# Patient Record
Sex: MALE | Race: WHITE | Employment: OTHER | URBAN - METROPOLITAN AREA
[De-identification: names, ages, dates, MRNs, and addresses within clinical notes are randomized per-mention and may not be internally consistent; named-entity substitution may affect disease eponyms.]

---

## 2017-01-19 ENCOUNTER — OFFICE VISIT (OUTPATIENT)
Dept: INTERNAL MEDICINE CLINIC | Age: 62
End: 2017-01-19

## 2017-01-19 VITALS
HEIGHT: 74 IN | SYSTOLIC BLOOD PRESSURE: 105 MMHG | HEART RATE: 88 BPM | WEIGHT: 180 LBS | DIASTOLIC BLOOD PRESSURE: 62 MMHG | BODY MASS INDEX: 23.1 KG/M2

## 2017-01-19 DIAGNOSIS — F17.201 TOBACCO ABUSE, IN REMISSION: ICD-10-CM

## 2017-01-19 DIAGNOSIS — Z13.9 SCREENING: ICD-10-CM

## 2017-01-19 DIAGNOSIS — I24.0 ISCHEMIC HEART DISEASE DUE TO CORONARY ARTERY OBSTRUCTION (HCC): ICD-10-CM

## 2017-01-19 DIAGNOSIS — E78.2 MIXED HYPERLIPIDEMIA: ICD-10-CM

## 2017-01-19 DIAGNOSIS — I10 ESSENTIAL HYPERTENSION: ICD-10-CM

## 2017-01-19 DIAGNOSIS — I25.9 ISCHEMIC HEART DISEASE DUE TO CORONARY ARTERY OBSTRUCTION (HCC): ICD-10-CM

## 2017-01-19 DIAGNOSIS — E55.9 VITAMIN D DEFICIENCY: ICD-10-CM

## 2017-01-19 DIAGNOSIS — F31.9 BIPOLAR AFFECTIVE DISORDER, REMISSION STATUS UNSPECIFIED (HCC): ICD-10-CM

## 2017-01-19 PROCEDURE — 99214 OFFICE O/P EST MOD 30 MIN: CPT | Performed by: INTERNAL MEDICINE

## 2017-01-19 RX ORDER — METFORMIN HYDROCHLORIDE 500 MG/1
2000 TABLET, EXTENDED RELEASE ORAL
Qty: 90 TABLET | Refills: 3 | Status: SHIPPED | OUTPATIENT
Start: 2017-01-19 | End: 2017-03-03 | Stop reason: SDUPTHER

## 2017-01-19 RX ORDER — SIMVASTATIN 40 MG
40 TABLET ORAL NIGHTLY
Qty: 90 TABLET | Refills: 3 | Status: SHIPPED | OUTPATIENT
Start: 2017-01-19 | End: 2018-01-27 | Stop reason: SDUPTHER

## 2017-01-19 RX ORDER — TAMSULOSIN HYDROCHLORIDE 0.4 MG/1
0.4 CAPSULE ORAL DAILY
Qty: 90 CAPSULE | Refills: 3 | Status: SHIPPED | OUTPATIENT
Start: 2017-01-19 | End: 2018-05-01 | Stop reason: SDUPTHER

## 2017-01-27 RX ORDER — CALCIUM CITRATE/VITAMIN D3 200MG-6.25
TABLET ORAL
Qty: 100 STRIP | Refills: 2 | Status: SHIPPED | OUTPATIENT
Start: 2017-01-27 | End: 2017-04-28 | Stop reason: SDUPTHER

## 2017-01-27 RX ORDER — BISACODYL 5 MG
TABLET, DELAYED RELEASE (ENTERIC COATED) ORAL
Qty: 30 TABLET | Refills: 2 | Status: SHIPPED | OUTPATIENT
Start: 2017-01-27 | End: 2017-04-28 | Stop reason: SDUPTHER

## 2017-01-27 RX ORDER — QUETIAPINE FUMARATE 100 MG/1
TABLET, FILM COATED ORAL
Qty: 90 TABLET | Refills: 2 | Status: SHIPPED | OUTPATIENT
Start: 2017-01-27 | End: 2017-04-28 | Stop reason: SDUPTHER

## 2017-01-27 RX ORDER — SIMVASTATIN 10 MG
TABLET ORAL
Qty: 30 TABLET | Refills: 2 | OUTPATIENT
Start: 2017-01-27

## 2017-01-27 RX ORDER — PRIMIDONE 50 MG/1
TABLET ORAL
Qty: 90 TABLET | Refills: 2 | Status: SHIPPED | OUTPATIENT
Start: 2017-01-27 | End: 2018-03-01 | Stop reason: SDUPTHER

## 2017-02-07 RX ORDER — ASPIRIN 81 MG
TABLET, DELAYED RELEASE (ENTERIC COATED) ORAL
Qty: 30 TABLET | Refills: 2 | Status: SHIPPED | OUTPATIENT
Start: 2017-02-07 | End: 2017-04-28 | Stop reason: SDUPTHER

## 2017-03-03 ENCOUNTER — TELEPHONE (OUTPATIENT)
Dept: INTERNAL MEDICINE CLINIC | Age: 62
End: 2017-03-03

## 2017-03-03 DIAGNOSIS — Z13.9 SCREENING: ICD-10-CM

## 2017-03-03 DIAGNOSIS — E78.2 MIXED HYPERLIPIDEMIA: ICD-10-CM

## 2017-03-03 DIAGNOSIS — E55.9 VITAMIN D DEFICIENCY: ICD-10-CM

## 2017-03-03 LAB
A/G RATIO: 1.4 (ref 1.1–2.2)
ALBUMIN SERPL-MCNC: 4.1 G/DL (ref 3.4–5)
ALP BLD-CCNC: 88 U/L (ref 40–129)
ALT SERPL-CCNC: 19 U/L (ref 10–40)
ANION GAP SERPL CALCULATED.3IONS-SCNC: 16 MMOL/L (ref 3–16)
AST SERPL-CCNC: 16 U/L (ref 15–37)
BILIRUB SERPL-MCNC: 0.3 MG/DL (ref 0–1)
BUN BLDV-MCNC: 18 MG/DL (ref 7–20)
CALCIUM SERPL-MCNC: 9.3 MG/DL (ref 8.3–10.6)
CHLORIDE BLD-SCNC: 99 MMOL/L (ref 99–110)
CHOLESTEROL, TOTAL: 121 MG/DL (ref 0–199)
CO2: 25 MMOL/L (ref 21–32)
CREAT SERPL-MCNC: 0.7 MG/DL (ref 0.8–1.3)
GFR AFRICAN AMERICAN: >60
GFR NON-AFRICAN AMERICAN: >60
GLOBULIN: 2.9 G/DL
GLUCOSE BLD-MCNC: 185 MG/DL (ref 70–99)
HDLC SERPL-MCNC: 48 MG/DL (ref 40–60)
HEPATITIS C ANTIBODY INTERPRETATION: NORMAL
LDL CHOLESTEROL CALCULATED: 58 MG/DL
POTASSIUM SERPL-SCNC: 4.1 MMOL/L (ref 3.5–5.1)
SODIUM BLD-SCNC: 140 MMOL/L (ref 136–145)
TOTAL PROTEIN: 7 G/DL (ref 6.4–8.2)
TRIGL SERPL-MCNC: 73 MG/DL (ref 0–150)
VITAMIN D 25-HYDROXY: 18.1 NG/ML
VLDLC SERPL CALC-MCNC: 15 MG/DL

## 2017-03-03 RX ORDER — METFORMIN HYDROCHLORIDE 500 MG/1
2000 TABLET, EXTENDED RELEASE ORAL
Qty: 120 TABLET | Refills: 11 | Status: SHIPPED | OUTPATIENT
Start: 2017-03-03 | End: 2017-09-18 | Stop reason: SDUPTHER

## 2017-03-04 LAB
ESTIMATED AVERAGE GLUCOSE: 246 MG/DL
HBA1C MFR BLD: 10.2 %

## 2017-03-06 LAB — HIV-1 AND HIV-2 ANTIBODIES: NORMAL

## 2017-03-10 ENCOUNTER — OFFICE VISIT (OUTPATIENT)
Dept: INTERNAL MEDICINE CLINIC | Age: 62
End: 2017-03-10

## 2017-03-10 VITALS
HEIGHT: 74 IN | BODY MASS INDEX: 24.26 KG/M2 | HEART RATE: 82 BPM | OXYGEN SATURATION: 91 % | WEIGHT: 189 LBS | DIASTOLIC BLOOD PRESSURE: 71 MMHG | RESPIRATION RATE: 16 BRPM | SYSTOLIC BLOOD PRESSURE: 131 MMHG

## 2017-03-10 DIAGNOSIS — J30.81 ALLERGIC RHINITIS DUE TO ANIMAL HAIR AND DANDER, UNSPECIFIED RHINITIS SEASONALITY: ICD-10-CM

## 2017-03-10 DIAGNOSIS — I24.0 ISCHEMIC HEART DISEASE DUE TO CORONARY ARTERY OBSTRUCTION (HCC): ICD-10-CM

## 2017-03-10 DIAGNOSIS — I25.9 ISCHEMIC HEART DISEASE DUE TO CORONARY ARTERY OBSTRUCTION (HCC): ICD-10-CM

## 2017-03-10 DIAGNOSIS — F31.9 BIPOLAR AFFECTIVE DISORDER, REMISSION STATUS UNSPECIFIED (HCC): ICD-10-CM

## 2017-03-10 DIAGNOSIS — I10 ESSENTIAL HYPERTENSION: ICD-10-CM

## 2017-03-10 DIAGNOSIS — E78.2 MIXED HYPERLIPIDEMIA: ICD-10-CM

## 2017-03-10 DIAGNOSIS — F17.201 TOBACCO ABUSE, IN REMISSION: ICD-10-CM

## 2017-03-10 DIAGNOSIS — E55.9 VITAMIN D DEFICIENCY: ICD-10-CM

## 2017-03-10 PROCEDURE — 99214 OFFICE O/P EST MOD 30 MIN: CPT | Performed by: NURSE PRACTITIONER

## 2017-03-10 RX ORDER — ERGOCALCIFEROL (VITAMIN D2) 1250 MCG
50000 CAPSULE ORAL WEEKLY
Qty: 4 CAPSULE | Refills: 3 | Status: SHIPPED | OUTPATIENT
Start: 2017-03-10 | End: 2017-07-30 | Stop reason: SDUPTHER

## 2017-03-10 ASSESSMENT — ENCOUNTER SYMPTOMS
RHINORRHEA: 1
BACK PAIN: 1
WHEEZING: 0
SHORTNESS OF BREATH: 0
COUGH: 0

## 2017-03-28 RX ORDER — INSULIN ASPART 100 [IU]/ML
INJECTION, SOLUTION INTRAVENOUS; SUBCUTANEOUS
Qty: 15 PEN | Refills: 10 | Status: SHIPPED | OUTPATIENT
Start: 2017-03-28 | End: 2018-05-01 | Stop reason: SDUPTHER

## 2017-03-28 RX ORDER — INSULIN DETEMIR 100 [IU]/ML
INJECTION, SOLUTION SUBCUTANEOUS
Qty: 5 PEN | Refills: 10 | Status: SHIPPED | OUTPATIENT
Start: 2017-03-28 | End: 2017-04-07 | Stop reason: SDUPTHER

## 2017-04-07 ENCOUNTER — OFFICE VISIT (OUTPATIENT)
Dept: INTERNAL MEDICINE CLINIC | Age: 62
End: 2017-04-07

## 2017-04-07 VITALS
BODY MASS INDEX: 24.26 KG/M2 | DIASTOLIC BLOOD PRESSURE: 76 MMHG | HEIGHT: 74 IN | HEART RATE: 89 BPM | WEIGHT: 189 LBS | SYSTOLIC BLOOD PRESSURE: 164 MMHG | RESPIRATION RATE: 17 BRPM

## 2017-04-07 DIAGNOSIS — Z72.0 TOBACCO ABUSE: ICD-10-CM

## 2017-04-07 DIAGNOSIS — E78.2 MIXED HYPERLIPIDEMIA: ICD-10-CM

## 2017-04-07 DIAGNOSIS — F43.10 POSTTRAUMATIC STRESS DISORDER: ICD-10-CM

## 2017-04-07 DIAGNOSIS — Z91.199 PATIENT NONADHERENCE: ICD-10-CM

## 2017-04-07 DIAGNOSIS — E55.9 VITAMIN D DEFICIENCY: ICD-10-CM

## 2017-04-07 DIAGNOSIS — I24.0 ISCHEMIC HEART DISEASE DUE TO CORONARY ARTERY OBSTRUCTION (HCC): ICD-10-CM

## 2017-04-07 DIAGNOSIS — I25.9 ISCHEMIC HEART DISEASE DUE TO CORONARY ARTERY OBSTRUCTION (HCC): ICD-10-CM

## 2017-04-07 DIAGNOSIS — I10 ESSENTIAL HYPERTENSION: ICD-10-CM

## 2017-04-07 DIAGNOSIS — F31.9 BIPOLAR AFFECTIVE DISORDER, REMISSION STATUS UNSPECIFIED (HCC): Primary | ICD-10-CM

## 2017-04-07 PROBLEM — F17.201 TOBACCO ABUSE, IN REMISSION: Status: RESOLVED | Noted: 2017-01-19 | Resolved: 2017-04-07

## 2017-04-07 LAB
CREATININE URINE POCT: 46.7
MICROALBUMIN/CREAT 24H UR: 48.7 MG/G{CREAT}
MICROALBUMIN/CREAT UR-RTO: 104.3

## 2017-04-07 PROCEDURE — 99215 OFFICE O/P EST HI 40 MIN: CPT | Performed by: INTERNAL MEDICINE

## 2017-04-07 PROCEDURE — 82044 UR ALBUMIN SEMIQUANTITATIVE: CPT | Performed by: INTERNAL MEDICINE

## 2017-05-02 RX ORDER — CALCIUM CITRATE/VITAMIN D3 200MG-6.25
TABLET ORAL
Qty: 100 STRIP | Refills: 1 | Status: SHIPPED | OUTPATIENT
Start: 2017-05-02 | End: 2017-06-29 | Stop reason: SDUPTHER

## 2017-05-02 RX ORDER — QUETIAPINE FUMARATE 100 MG/1
TABLET, FILM COATED ORAL
Qty: 90 TABLET | Refills: 1 | Status: SHIPPED | OUTPATIENT
Start: 2017-05-02 | End: 2017-07-01 | Stop reason: SDUPTHER

## 2017-05-02 RX ORDER — ASPIRIN 81 MG/1
TABLET ORAL
Qty: 30 TABLET | Refills: 1 | Status: SHIPPED | OUTPATIENT
Start: 2017-05-02 | End: 2017-06-29 | Stop reason: SDUPTHER

## 2017-05-02 RX ORDER — BISACODYL 5 MG
TABLET, DELAYED RELEASE (ENTERIC COATED) ORAL
Qty: 30 TABLET | Refills: 1 | Status: SHIPPED | OUTPATIENT
Start: 2017-05-02 | End: 2017-06-29 | Stop reason: SDUPTHER

## 2017-05-02 RX ORDER — DOCUSATE SODIUM 100 MG/1
CAPSULE ORAL
Qty: 180 CAPSULE | Refills: 2 | Status: SHIPPED | OUTPATIENT
Start: 2017-05-02 | End: 2018-03-05 | Stop reason: SDUPTHER

## 2017-05-02 RX ORDER — BUPROPION HYDROCHLORIDE 100 MG/1
TABLET, EXTENDED RELEASE ORAL
Qty: 90 TABLET | Refills: 2 | Status: SHIPPED | OUTPATIENT
Start: 2017-05-02 | End: 2018-01-30 | Stop reason: SDUPTHER

## 2017-06-30 RX ORDER — CALCIUM CITRATE/VITAMIN D3 200MG-6.25
TABLET ORAL
Qty: 100 STRIP | Refills: 0 | Status: SHIPPED | OUTPATIENT
Start: 2017-06-30 | End: 2017-07-26 | Stop reason: SDUPTHER

## 2017-06-30 RX ORDER — ASPIRIN 81 MG/1
TABLET ORAL
Qty: 30 TABLET | Refills: 0 | Status: SHIPPED | OUTPATIENT
Start: 2017-06-30 | End: 2017-07-26 | Stop reason: SDUPTHER

## 2017-06-30 RX ORDER — BISACODYL 5 MG
TABLET, DELAYED RELEASE (ENTERIC COATED) ORAL
Qty: 30 TABLET | Refills: 0 | Status: SHIPPED | OUTPATIENT
Start: 2017-06-30 | End: 2017-07-26 | Stop reason: SDUPTHER

## 2017-07-03 ENCOUNTER — TELEPHONE (OUTPATIENT)
Dept: INTERNAL MEDICINE CLINIC | Age: 62
End: 2017-07-03

## 2017-07-03 RX ORDER — QUETIAPINE FUMARATE 100 MG/1
TABLET, FILM COATED ORAL
Qty: 90 TABLET | Refills: 0 | Status: SHIPPED | OUTPATIENT
Start: 2017-07-03 | End: 2017-07-27 | Stop reason: SDUPTHER

## 2017-07-27 RX ORDER — QUETIAPINE FUMARATE 100 MG/1
TABLET, FILM COATED ORAL
Qty: 90 TABLET | OUTPATIENT
Start: 2017-07-27

## 2017-07-27 RX ORDER — QUETIAPINE FUMARATE 100 MG/1
TABLET, FILM COATED ORAL
Qty: 90 TABLET | Refills: 0 | Status: SHIPPED | OUTPATIENT
Start: 2017-07-27 | End: 2018-01-06

## 2017-07-27 RX ORDER — ASPIRIN 81 MG
TABLET, DELAYED RELEASE (ENTERIC COATED) ORAL
Qty: 100 TABLET | Refills: 3 | Status: SHIPPED | OUTPATIENT
Start: 2017-07-27 | End: 2018-01-30 | Stop reason: SDUPTHER

## 2017-07-27 RX ORDER — CALCIUM CITRATE/VITAMIN D3 200MG-6.25
TABLET ORAL
Qty: 100 STRIP | Refills: 11 | Status: SHIPPED | OUTPATIENT
Start: 2017-07-27 | End: 2017-09-18 | Stop reason: SDUPTHER

## 2017-08-10 ENCOUNTER — OFFICE VISIT (OUTPATIENT)
Dept: INTERNAL MEDICINE CLINIC | Age: 62
End: 2017-08-10

## 2017-08-10 VITALS
OXYGEN SATURATION: 93 % | TEMPERATURE: 98.5 F | DIASTOLIC BLOOD PRESSURE: 76 MMHG | SYSTOLIC BLOOD PRESSURE: 145 MMHG | WEIGHT: 173 LBS | BODY MASS INDEX: 22.21 KG/M2 | HEART RATE: 89 BPM

## 2017-08-10 DIAGNOSIS — F31.9 BIPOLAR AFFECTIVE DISORDER, REMISSION STATUS UNSPECIFIED (HCC): ICD-10-CM

## 2017-08-10 DIAGNOSIS — I25.9 ISCHEMIC HEART DISEASE DUE TO CORONARY ARTERY OBSTRUCTION (HCC): ICD-10-CM

## 2017-08-10 DIAGNOSIS — F33.9 RECURRENT MAJOR DEPRESSIVE EPISODES (HCC): ICD-10-CM

## 2017-08-10 DIAGNOSIS — Z91.199 PATIENT NONADHERENCE: ICD-10-CM

## 2017-08-10 DIAGNOSIS — I10 ESSENTIAL HYPERTENSION: ICD-10-CM

## 2017-08-10 DIAGNOSIS — R29.898 WEAKNESS OF BOTH LOWER EXTREMITIES: ICD-10-CM

## 2017-08-10 DIAGNOSIS — F43.10 POSTTRAUMATIC STRESS DISORDER: ICD-10-CM

## 2017-08-10 DIAGNOSIS — E78.2 MIXED HYPERLIPIDEMIA: ICD-10-CM

## 2017-08-10 DIAGNOSIS — I24.0 ISCHEMIC HEART DISEASE DUE TO CORONARY ARTERY OBSTRUCTION (HCC): ICD-10-CM

## 2017-08-10 DIAGNOSIS — M48.061 SPINAL STENOSIS OF LUMBAR REGION: ICD-10-CM

## 2017-08-10 DIAGNOSIS — Z72.0 TOBACCO ABUSE: ICD-10-CM

## 2017-08-10 LAB — HBA1C MFR BLD: 11.5 %

## 2017-08-10 PROCEDURE — 83036 HEMOGLOBIN GLYCOSYLATED A1C: CPT | Performed by: INTERNAL MEDICINE

## 2017-08-10 PROCEDURE — 99214 OFFICE O/P EST MOD 30 MIN: CPT | Performed by: INTERNAL MEDICINE

## 2017-09-18 ENCOUNTER — OFFICE VISIT (OUTPATIENT)
Dept: ENDOCRINOLOGY | Age: 62
End: 2017-09-18

## 2017-09-18 VITALS
WEIGHT: 174 LBS | HEIGHT: 74 IN | HEART RATE: 96 BPM | SYSTOLIC BLOOD PRESSURE: 126 MMHG | BODY MASS INDEX: 22.33 KG/M2 | DIASTOLIC BLOOD PRESSURE: 82 MMHG | OXYGEN SATURATION: 94 %

## 2017-09-18 DIAGNOSIS — E55.9 VITAMIN D DEFICIENCY: ICD-10-CM

## 2017-09-18 LAB — HBA1C MFR BLD: 7.5 %

## 2017-09-18 PROCEDURE — 83036 HEMOGLOBIN GLYCOSYLATED A1C: CPT | Performed by: NURSE PRACTITIONER

## 2017-09-18 PROCEDURE — 99203 OFFICE O/P NEW LOW 30 MIN: CPT | Performed by: NURSE PRACTITIONER

## 2017-09-18 PROCEDURE — G0444 DEPRESSION SCREEN ANNUAL: HCPCS | Performed by: NURSE PRACTITIONER

## 2017-09-18 RX ORDER — METFORMIN HYDROCHLORIDE 500 MG/1
2000 TABLET, EXTENDED RELEASE ORAL
Qty: 120 TABLET | Refills: 11 | Status: SHIPPED | OUTPATIENT
Start: 2017-09-18 | End: 2018-05-01 | Stop reason: SDUPTHER

## 2017-09-18 ASSESSMENT — PATIENT HEALTH QUESTIONNAIRE - PHQ9
9. THOUGHTS THAT YOU WOULD BE BETTER OFF DEAD, OR OF HURTING YOURSELF: 1
5. POOR APPETITE OR OVEREATING: 0
4. FEELING TIRED OR HAVING LITTLE ENERGY: 3
6. FEELING BAD ABOUT YOURSELF - OR THAT YOU ARE A FAILURE OR HAVE LET YOURSELF OR YOUR FAMILY DOWN: 1
2. FEELING DOWN, DEPRESSED OR HOPELESS: 3
10. IF YOU CHECKED OFF ANY PROBLEMS, HOW DIFFICULT HAVE THESE PROBLEMS MADE IT FOR YOU TO DO YOUR WORK, TAKE CARE OF THINGS AT HOME, OR GET ALONG WITH OTHER PEOPLE: 1
3. TROUBLE FALLING OR STAYING ASLEEP: 3
SUM OF ALL RESPONSES TO PHQ QUESTIONS 1-9: 17
SUM OF ALL RESPONSES TO PHQ9 QUESTIONS 1 & 2: 6
8. MOVING OR SPEAKING SO SLOWLY THAT OTHER PEOPLE COULD HAVE NOTICED. OR THE OPPOSITE, BEING SO FIGETY OR RESTLESS THAT YOU HAVE BEEN MOVING AROUND A LOT MORE THAN USUAL: 2
7. TROUBLE CONCENTRATING ON THINGS, SUCH AS READING THE NEWSPAPER OR WATCHING TELEVISION: 1
1. LITTLE INTEREST OR PLEASURE IN DOING THINGS: 3

## 2017-09-19 ASSESSMENT — ENCOUNTER SYMPTOMS
NAUSEA: 0
CONSTIPATION: 1
DIARRHEA: 0
EYE PAIN: 0
COLOR CHANGE: 0
WHEEZING: 1
SHORTNESS OF BREATH: 1

## 2017-10-26 DIAGNOSIS — E55.9 VITAMIN D DEFICIENCY: ICD-10-CM

## 2017-10-26 LAB
A/G RATIO: 1.1 (ref 1.1–2.2)
ALBUMIN SERPL-MCNC: 3.9 G/DL (ref 3.4–5)
ALP BLD-CCNC: 96 U/L (ref 40–129)
ALT SERPL-CCNC: 12 U/L (ref 10–40)
ANION GAP SERPL CALCULATED.3IONS-SCNC: 12 MMOL/L (ref 3–16)
AST SERPL-CCNC: 13 U/L (ref 15–37)
BILIRUB SERPL-MCNC: <0.2 MG/DL (ref 0–1)
BUN BLDV-MCNC: 12 MG/DL (ref 7–20)
CALCIUM SERPL-MCNC: 9.9 MG/DL (ref 8.3–10.6)
CHLORIDE BLD-SCNC: 94 MMOL/L (ref 99–110)
CO2: 29 MMOL/L (ref 21–32)
CREAT SERPL-MCNC: 0.6 MG/DL (ref 0.8–1.3)
CREATININE URINE: 18.9 MG/DL (ref 39–259)
GFR AFRICAN AMERICAN: >60
GFR NON-AFRICAN AMERICAN: >60
GLOBULIN: 3.4 G/DL
GLUCOSE BLD-MCNC: 338 MG/DL (ref 70–99)
HCT VFR BLD CALC: 45.6 % (ref 40.5–52.5)
HEMOGLOBIN: 15.3 G/DL (ref 13.5–17.5)
MCH RBC QN AUTO: 30.8 PG (ref 26–34)
MCHC RBC AUTO-ENTMCNC: 33.5 G/DL (ref 31–36)
MCV RBC AUTO: 92.2 FL (ref 80–100)
MICROALBUMIN UR-MCNC: <1.2 MG/DL
MICROALBUMIN/CREAT UR-RTO: ABNORMAL MG/G (ref 0–30)
PDW BLD-RTO: 15.2 % (ref 12.4–15.4)
PHOSPHORUS: 3.7 MG/DL (ref 2.5–4.9)
PLATELET # BLD: 171 K/UL (ref 135–450)
PMV BLD AUTO: 8.7 FL (ref 5–10.5)
POTASSIUM SERPL-SCNC: 5.3 MMOL/L (ref 3.5–5.1)
RBC # BLD: 4.95 M/UL (ref 4.2–5.9)
SODIUM BLD-SCNC: 135 MMOL/L (ref 136–145)
TOTAL PROTEIN: 7.3 G/DL (ref 6.4–8.2)
TSH SERPL DL<=0.05 MIU/L-ACNC: 1.4 UIU/ML (ref 0.27–4.2)
VITAMIN D 25-HYDROXY: 38.4 NG/ML
WBC # BLD: 11 K/UL (ref 4–11)

## 2017-11-01 ENCOUNTER — OFFICE VISIT (OUTPATIENT)
Dept: ENDOCRINOLOGY | Age: 62
End: 2017-11-01

## 2017-11-01 VITALS
WEIGHT: 176.6 LBS | HEIGHT: 74 IN | HEART RATE: 89 BPM | SYSTOLIC BLOOD PRESSURE: 148 MMHG | OXYGEN SATURATION: 94 % | BODY MASS INDEX: 22.66 KG/M2 | DIASTOLIC BLOOD PRESSURE: 80 MMHG

## 2017-11-01 LAB — HBA1C MFR BLD: 7.8 %

## 2017-11-01 PROCEDURE — 83036 HEMOGLOBIN GLYCOSYLATED A1C: CPT | Performed by: NURSE PRACTITIONER

## 2017-11-01 PROCEDURE — 99214 OFFICE O/P EST MOD 30 MIN: CPT | Performed by: NURSE PRACTITIONER

## 2017-11-01 RX ORDER — LANCETS
1 EACH MISCELLANEOUS 4 TIMES DAILY
Qty: 100 EACH | Refills: 6 | Status: SHIPPED | OUTPATIENT
Start: 2017-11-01 | End: 2018-06-01 | Stop reason: DRUGHIGH

## 2017-11-01 ASSESSMENT — PATIENT HEALTH QUESTIONNAIRE - PHQ9
SUM OF ALL RESPONSES TO PHQ9 QUESTIONS 1 & 2: 3
8. MOVING OR SPEAKING SO SLOWLY THAT OTHER PEOPLE COULD HAVE NOTICED. OR THE OPPOSITE, BEING SO FIGETY OR RESTLESS THAT YOU HAVE BEEN MOVING AROUND A LOT MORE THAN USUAL: 0
10. IF YOU CHECKED OFF ANY PROBLEMS, HOW DIFFICULT HAVE THESE PROBLEMS MADE IT FOR YOU TO DO YOUR WORK, TAKE CARE OF THINGS AT HOME, OR GET ALONG WITH OTHER PEOPLE: 1
6. FEELING BAD ABOUT YOURSELF - OR THAT YOU ARE A FAILURE OR HAVE LET YOURSELF OR YOUR FAMILY DOWN: 0
7. TROUBLE CONCENTRATING ON THINGS, SUCH AS READING THE NEWSPAPER OR WATCHING TELEVISION: 0
3. TROUBLE FALLING OR STAYING ASLEEP: 3
2. FEELING DOWN, DEPRESSED OR HOPELESS: 3
5. POOR APPETITE OR OVEREATING: 0
SUM OF ALL RESPONSES TO PHQ QUESTIONS 1-9: 10
4. FEELING TIRED OR HAVING LITTLE ENERGY: 3
9. THOUGHTS THAT YOU WOULD BE BETTER OFF DEAD, OR OF HURTING YOURSELF: 1
1. LITTLE INTEREST OR PLEASURE IN DOING THINGS: 0

## 2017-11-01 ASSESSMENT — ENCOUNTER SYMPTOMS
SHORTNESS OF BREATH: 0
EYE PAIN: 0
CONSTIPATION: 0
NAUSEA: 0
COLOR CHANGE: 0
DIARRHEA: 0

## 2017-11-01 NOTE — PROGRESS NOTES
vitamin D level is:  Lab Results   Component Value Date    VITD25 38.4 10/26/2017    VITD25 18.1 03/03/2017     Hyperlipidemia: Currently is on Zocor 40 mg. Controlled  Lab Results   Component Value Date    CHOL 121 03/03/2017     Lab Results   Component Value Date    TRIG 73 03/03/2017     Lab Results   Component Value Date    HDL 48 03/03/2017     Lab Results   Component Value Date    LDLCALC 58 03/03/2017     No results found for: LDLDIRECT  No results found for: CHOLHDLRATIO.5    Past Medical History:   Diagnosis Date    Allergic rhinitis     arthri     Arthritis     lumbar spine    CAD (coronary artery disease)     Chronic kidney disease     retention, bph    Constipation     DM (diabetes mellitus) ([address]     Generalized pain     GERD (gastroesophageal reflux disease)     constipation    Heart attack     Heart disease     Hyperlipidemia     Movement disorder     lumbar spine arthritis    Psychiatric problem     ptsd, anxiety, depression    Urinary retention      Family History   Problem Relation Age of Onset    Miscarriages / Stillbirths Mother     Diabetes Maternal Uncle     Diabetes Paternal Aunt      Review of Systems   Constitutional: Negative for activity change, appetite change, diaphoresis, fever and unexpected weight change. HENT: Negative for dental problem. Eyes: Positive for visual disturbance. Negative for pain. Respiratory: Negative for shortness of breath. Cardiovascular: Negative for chest pain, palpitations and leg swelling. Gastrointestinal: Negative for constipation, diarrhea and nausea. Endocrine: Negative for cold intolerance, heat intolerance, polydipsia, polyphagia and polyuria. Genitourinary: Negative for frequency and urgency. Musculoskeletal: Negative for arthralgias, joint swelling and myalgias. Skin: Negative for color change and pallor. Neurological: Positive for headaches. Negative for weakness and numbness.    Psychiatric/Behavioral: Negative for dysphoric mood and sleep disturbance. The patient is not nervous/anxious. Vitals:    11/01/17 1320   BP: (!) 148/80   Site: Right Arm   Position: Sitting   Cuff Size: Large Adult   Pulse: 89   SpO2: 94%   Weight: 176 lb 9.6 oz (80.1 kg)   Height: 6' 2\" (1.88 m)     Physical Exam   Constitutional: He is oriented to person, place, and time. He appears well-developed and well-nourished. Eyes: Pupils are equal, round, and reactive to light. Neck: Normal range of motion. No thyromegaly present. Cardiovascular: Normal rate, regular rhythm and normal heart sounds. Pulmonary/Chest: Effort normal and breath sounds normal.   Abdominal: He exhibits no distension. Musculoskeletal: Normal range of motion. He exhibits no edema. Neurological: He is alert and oriented to person, place, and time. No sensory deficit. Skin: Skin is warm and dry. No skin changes or evidence of trauma. Psychiatric: He has a normal mood and affect. His behavior is normal. Thought content normal.   Vitals reviewed. Skeletal foot exam: No skin lesions are noted. Skin is normal. Signs of onychomycosis are not noted on the skin. Calluses are not noted. Ingrown toenails are not noted. Toenails are normal. Pulses are +1 DP and +1 PT bilaterally. Capillary refill is <3 seconds. Skeletal structures are intact upon visual examination. No deformity is noted. Sensory: 10 g monofilament is 7/10 on the right and 10/10 on the left, 128 Hz vibration senses decreased bilaterally. Bruno Raphael is an uncontrolled diabetic for 23 + years with long term insulin use for > 11 years.     Plan  Problem List Items Addressed This Visit     Diabetes type 2, uncontrolled ([facility] [address] - Primary     Switch to injecting Lantus at HS @ same dose 70 U  Explained titration /adjusting Lantus to a morning fasting level of 140  Confusion about the variability in his A1c it went from 11.5 to 7.5 in one month with no significant treatment change/dietary

## 2017-11-01 NOTE — ASSESSMENT & PLAN NOTE
Switch to injecting Lantus at HS @ same dose 70 U  Explained titration /adjusting Lantus to a morning fasting level of 140  Confusion about the variability in his A1c it went from 11.5 to 7.5 in one month with no significant treatment change/dietary change  Recheck today established at 7.8%  Interested in managing better and reviewed CGM/Dexcom  Initiated paperwork to check for eligibility

## 2017-12-22 ENCOUNTER — OFFICE VISIT (OUTPATIENT)
Dept: ENDOCRINOLOGY | Age: 62
End: 2017-12-22

## 2017-12-22 VITALS
BODY MASS INDEX: 22.89 KG/M2 | WEIGHT: 178.4 LBS | HEART RATE: 102 BPM | OXYGEN SATURATION: 94 % | HEIGHT: 74 IN | DIASTOLIC BLOOD PRESSURE: 79 MMHG | SYSTOLIC BLOOD PRESSURE: 121 MMHG

## 2017-12-22 DIAGNOSIS — Z91.199 PATIENT NONADHERENCE: ICD-10-CM

## 2017-12-22 DIAGNOSIS — Z74.09 IMMOBILITY: ICD-10-CM

## 2017-12-22 DIAGNOSIS — F33.9 RECURRENT MAJOR DEPRESSIVE EPISODES (HCC): ICD-10-CM

## 2017-12-22 DIAGNOSIS — I10 ESSENTIAL HYPERTENSION: ICD-10-CM

## 2017-12-22 DIAGNOSIS — E78.2 MIXED HYPERLIPIDEMIA: ICD-10-CM

## 2017-12-22 DIAGNOSIS — Z72.0 TOBACCO ABUSE: ICD-10-CM

## 2017-12-22 PROCEDURE — 3045F PR MOST RECENT HEMOGLOBIN A1C LEVEL 7.0-9.0%: CPT | Performed by: NURSE PRACTITIONER

## 2017-12-22 PROCEDURE — G8484 FLU IMMUNIZE NO ADMIN: HCPCS | Performed by: NURSE PRACTITIONER

## 2017-12-22 PROCEDURE — 4004F PT TOBACCO SCREEN RCVD TLK: CPT | Performed by: NURSE PRACTITIONER

## 2017-12-22 PROCEDURE — 99214 OFFICE O/P EST MOD 30 MIN: CPT | Performed by: NURSE PRACTITIONER

## 2017-12-22 PROCEDURE — G8427 DOCREV CUR MEDS BY ELIG CLIN: HCPCS | Performed by: NURSE PRACTITIONER

## 2017-12-22 PROCEDURE — G8598 ASA/ANTIPLAT THER USED: HCPCS | Performed by: NURSE PRACTITIONER

## 2017-12-22 PROCEDURE — G8420 CALC BMI NORM PARAMETERS: HCPCS | Performed by: NURSE PRACTITIONER

## 2017-12-22 PROCEDURE — 3017F COLORECTAL CA SCREEN DOC REV: CPT | Performed by: NURSE PRACTITIONER

## 2017-12-22 RX ORDER — PROPRANOLOL HYDROCHLORIDE 10 MG/1
10 TABLET ORAL 2 TIMES DAILY
COMMUNITY
End: 2018-01-30

## 2017-12-22 ASSESSMENT — PATIENT HEALTH QUESTIONNAIRE - PHQ9
3. TROUBLE FALLING OR STAYING ASLEEP: 3
7. TROUBLE CONCENTRATING ON THINGS, SUCH AS READING THE NEWSPAPER OR WATCHING TELEVISION: 1
2. FEELING DOWN, DEPRESSED OR HOPELESS: 3
9. THOUGHTS THAT YOU WOULD BE BETTER OFF DEAD, OR OF HURTING YOURSELF: 0
SUM OF ALL RESPONSES TO PHQ QUESTIONS 1-9: 12
1. LITTLE INTEREST OR PLEASURE IN DOING THINGS: 2
10. IF YOU CHECKED OFF ANY PROBLEMS, HOW DIFFICULT HAVE THESE PROBLEMS MADE IT FOR YOU TO DO YOUR WORK, TAKE CARE OF THINGS AT HOME, OR GET ALONG WITH OTHER PEOPLE: 2
SUM OF ALL RESPONSES TO PHQ9 QUESTIONS 1 & 2: 5
4. FEELING TIRED OR HAVING LITTLE ENERGY: 3
5. POOR APPETITE OR OVEREATING: 0
8. MOVING OR SPEAKING SO SLOWLY THAT OTHER PEOPLE COULD HAVE NOTICED. OR THE OPPOSITE, BEING SO FIGETY OR RESTLESS THAT YOU HAVE BEEN MOVING AROUND A LOT MORE THAN USUAL: 0
6. FEELING BAD ABOUT YOURSELF - OR THAT YOU ARE A FAILURE OR HAVE LET YOURSELF OR YOUR FAMILY DOWN: 0

## 2017-12-22 ASSESSMENT — ENCOUNTER SYMPTOMS
EYE PAIN: 0
CONSTIPATION: 0
NAUSEA: 0
DIARRHEA: 0
COLOR CHANGE: 0
SHORTNESS OF BREATH: 0

## 2017-12-22 NOTE — PATIENT INSTRUCTIONS
Check sugars before meals  If over 150 add 2 units fo denita 50 points he sugar exceeds 10  Follow this scale  Sugars:  151 - 200: take 2 units  201 -250 : take 4 units  251 - 300: take 6 units  301- 350 : take 8 units  351- 400: take 10 units  > 450 : take 12 units    For every 10 gms of carbs in food ,or drink including alcohol add 1 unit of insulin per 10 grams.

## 2017-12-22 NOTE — PROGRESS NOTES
Endocrinology  [facility] [place]  Phone: [phone]   FAX: [phone]    Zuleyma Schulz is a 60year old male with a history of Diabetes Mellitus type 2 for 23 years    HPI   Diabetes:  Wing Evans is a 60 y.o. male  with a history of Diabetes Mellitus type 2 since he was in his early forties. Disease course has been stable and he is interested in improving A1c further. Has tried meformin , glybuide and glipizide with unsatisfactory reduction in A1c. Has brought his sugar / insulin log. Has not done CGM but is interested in it as a medical necessity due to severe issues with eyesight that make it hard to comply with frequent blood sugar checks. Patient uses a glucometer  Patient has not brought his glucometer for download, maintains a log at home,  States his current meter is not reliable with minute to minute variation in readings on rechecks  He is testing 4-6 times a day  Lowest in past 2 weeks: 65 (fasting)  Highest in past 2 weeks: 300 (fasting)  Hypoglycemia awareness and symptoms:has chills, visual disturbances that resolve with correction of sugar levels  Lives alone, limited mobility, wheel chair bound    A1C trends   Lab Results   Component Value Date    LABA1C 7.8 11/01/2017    LABA1C 7.5 09/18/2017    LABA1C 11.5 08/10/2017     Lab Results   Component Value Date    .0 03/03/2017     Current Medication regimen:   Levemir 70 U in the AM  Novolog: uses only moderate dose SSI; not a fixed dose per meal.  Injecting at mealtimes x 3 per day; injects  atleast 4 times per day  Covers for larger snacks when possible  Metformin 500 mg x 2 BID    Complications:  · Retinopathy : right eye enucleated s/p glaucoma and severe corneal infection, left eye s/p cataract 20/25 per patient  · Neuropathy:mild tingling in the feet.  Managed per podiatrist.  · Nephropathy:  Component    Latest Ref Rng & Units 10/26/2017   Microalbumin, Random Urine    <2.0 mg/dL <1.20   Creatinine, Ur    39.0 - 259.0 mg/dL 18.9 (L) Microalbumin Creatinine Ratio    0.0 - 30.0 mg/g see below     Diabetic Health Maintenance   · Last Eye Exam: follows Dr Michael Slater annually  · Last Foot exam: Sees podiatrist every 9 weeks  · Has patient seen a dietitian? Not recently  · Current Exercise: limited mobility in wheelchair, can transfer from chair to bed independently    Risk Factors  Smoker: started at 15 yrs old, quit 3 years ago but recently started back. Trying to quit again with help of e-cigarette. ETOH: 1-2 beer every night, uses regular. Miquel Hutchison not want to switch to a low autumn version ( \" few pleasures in life\")   Understands how to compensate for alcohol related hypoglycemia     Vitamin D deficiency: Currently is on 50K unit weekly. Current complaints include fatigue on daily basis. Last vitamin D level is:  Lab Results   Component Value Date    VITD25 38.4 10/26/2017    VITD25 18.1 03/03/2017     Hyperlipidemia: Currently is on Zocor 40 mg.  Controlled  Lab Results   Component Value Date    CHOL 121 03/03/2017     Lab Results   Component Value Date    TRIG 73 03/03/2017     Lab Results   Component Value Date    HDL 48 03/03/2017     Lab Results   Component Value Date    LDLCALC 58 03/03/2017     No results found for: LDLDIRECT  No results found for: CHOLHDLRATIO.5    Past Medical History:   Diagnosis Date    Allergic rhinitis     arthri     Arthritis     lumbar spine    CAD (coronary artery disease)     Chronic kidney disease     retention, bph    Constipation     DM [facility]di[facility] mellitus) ([facility] [address]     Generalized pain     GERD (gastroesophageal reflux disease)     constipation    Heart attack     Heart disease     Hyperlipidemia     Movement disorder     lumbar spine arthritis    Psychiatric problem     ptsd, anxiety, depression    Urinary retention      Family History   Problem Relation Age of Onset    Miscarriages / Stillbirths Mother     Diabetes Maternal Uncle     Diabetes Paternal Aunt      Review of Systems   Constitutional: Calluses are not noted. Ingrown toenails are not noted. Toenails are normal. Pulses are +1 DP and +1 PT bilaterally. Capillary refill is <3 seconds. Skeletal structures are intact upon visual examination. No deformity is noted. Sensory: 10 g monofilament is 7/10 on the right and 10/10 on the left, 128 Hz vibration senses decreased bilaterally. Michelle Ovalle is an uncontrolled diabetic for 23 + years with long term insulin use for > 11 years associated with diabetic neuropathy, hypertension, hyperlipidemia, limited mobility, very poor vision (post glaucoma) and depression. Plan  Problem List Items Addressed This Visit     Recurrent major depressive episodes ([address] - Primary    Hypertension     Elevated slightly at visit  Reports anxiety on being out of the house  Recheck the levels were WNL         Relevant Medications    propranolol (INDERAL) 10 MG tablet    Hyperlipidemia     Lipids at goal  Conitnues with statins but occasional side effects including myalgias and constipation         Relevant Medications    propranolol (INDERAL) 10 MG tablet    Diabetes type 2, uncontrolled ([address]     Patient is struggling to cope with diabetes management because of frequency of checks and injections which is hard given poor eyesight  Recommend that he apply for a personal CGM sensor such as Dexcom as a medical necessity in order to simplify /facilitate his diabetes management and improve outcomes.   Continue with Lantus at 70 U : did not understand titration so have re explained with written instructions to titrate up by 1 unit for every 3 days  Levels are > 130 fasting AM.  Continue with Humalog on sliding scale as below  Check sugars before meals  If over 150 add 2 units for every 50 points he sugar exceeds 10  151 - 200: take 2 units  201 -250 : take 4 units  251 - 300: take 6 units  301- 350 : take 8 units  351- 400: take 10 units  > 450 : take 12 units    For every 10 gms of carbs in food ,or drink including alcohol add 1 unit of insulin per 10 grams. Patient nonadherence     Adherence has improved  He is not intentionally adherent but the burden of diabetic management( frequent checks and injections) is overwhelming for him given his poor eyesight and limited mobility with no assistance at home. Immobility     Wheel chair bound for several years  Able to transfer self from chair to bed and in bathroom  Lives alone, does not have much help with ADLs         Tobacco abuse     Trying to quit again  Started to use an E cigarette           Other Visit Diagnoses    None. Return in about 2 months (around 2/22/2018).

## 2017-12-23 NOTE — ASSESSMENT & PLAN NOTE
Wheel chair bound for several years  Able to transfer self from chair to bed and in bathroom  Lives alone, does not have much help with ADLs

## 2018-01-05 ENCOUNTER — TELEPHONE (OUTPATIENT)
Dept: ENDOCRINOLOGY | Age: 63
End: 2018-01-05

## 2018-01-05 NOTE — TELEPHONE ENCOUNTER
Spoke with pt and pt stated that the home office in [place] for [address] called him and stated that there are no suppliers in his area that supply Dexcom. Pt also stated that the representative told him to have us send ppw straight to [facility] stating Dexcom is Medically Necessary. I advised pt I would speak with Adelaida Hatfield and see what he can do on his end to get this going. Spoke with Adelaida Hatfield with Dexcom and Ely is going to speak with an inside person who takes care of Medicare pt's to see what needs to be done with PPW to get pt approved. Thanks!

## 2018-01-27 DIAGNOSIS — E78.2 MIXED HYPERLIPIDEMIA: ICD-10-CM

## 2018-01-27 DIAGNOSIS — E55.9 VITAMIN D DEFICIENCY: ICD-10-CM

## 2018-01-30 ENCOUNTER — TELEPHONE (OUTPATIENT)
Dept: INTERNAL MEDICINE CLINIC | Age: 63
End: 2018-01-30

## 2018-01-30 ENCOUNTER — OFFICE VISIT (OUTPATIENT)
Dept: INTERNAL MEDICINE CLINIC | Age: 63
End: 2018-01-30

## 2018-01-30 VITALS
SYSTOLIC BLOOD PRESSURE: 132 MMHG | HEIGHT: 74 IN | BODY MASS INDEX: 22.97 KG/M2 | WEIGHT: 179 LBS | DIASTOLIC BLOOD PRESSURE: 72 MMHG | HEART RATE: 84 BPM

## 2018-01-30 DIAGNOSIS — E78.2 MIXED HYPERLIPIDEMIA: ICD-10-CM

## 2018-01-30 DIAGNOSIS — F33.9 RECURRENT MAJOR DEPRESSIVE EPISODES (HCC): ICD-10-CM

## 2018-01-30 DIAGNOSIS — E55.9 VITAMIN D DEFICIENCY: ICD-10-CM

## 2018-01-30 DIAGNOSIS — F51.05 INSOMNIA DUE TO MENTAL DISORDER: ICD-10-CM

## 2018-01-30 DIAGNOSIS — N40.1 BENIGN PROSTATIC HYPERPLASIA WITH URINARY FREQUENCY: ICD-10-CM

## 2018-01-30 DIAGNOSIS — F43.10 POSTTRAUMATIC STRESS DISORDER: ICD-10-CM

## 2018-01-30 DIAGNOSIS — F17.210 NICOTINE DEPENDENCE, CIGARETTES, UNCOMPLICATED: ICD-10-CM

## 2018-01-30 DIAGNOSIS — I10 ESSENTIAL HYPERTENSION: ICD-10-CM

## 2018-01-30 DIAGNOSIS — R35.0 BENIGN PROSTATIC HYPERPLASIA WITH URINARY FREQUENCY: ICD-10-CM

## 2018-01-30 DIAGNOSIS — Z76.89 ENCOUNTER TO ESTABLISH CARE: Primary | ICD-10-CM

## 2018-01-30 DIAGNOSIS — F31.9 BIPOLAR AFFECTIVE DISORDER, REMISSION STATUS UNSPECIFIED (HCC): ICD-10-CM

## 2018-01-30 DIAGNOSIS — Z23 NEED FOR INFLUENZA VACCINATION: ICD-10-CM

## 2018-01-30 PROCEDURE — G0008 ADMIN INFLUENZA VIRUS VAC: HCPCS | Performed by: NURSE PRACTITIONER

## 2018-01-30 PROCEDURE — 99215 OFFICE O/P EST HI 40 MIN: CPT | Performed by: NURSE PRACTITIONER

## 2018-01-30 PROCEDURE — 90686 IIV4 VACC NO PRSV 0.5 ML IM: CPT | Performed by: NURSE PRACTITIONER

## 2018-01-30 PROCEDURE — G0296 VISIT TO DETERM LDCT ELIG: HCPCS | Performed by: NURSE PRACTITIONER

## 2018-01-30 RX ORDER — BUPROPION HYDROCHLORIDE 100 MG/1
100 TABLET, EXTENDED RELEASE ORAL 2 TIMES DAILY
Qty: 180 TABLET | Refills: 3 | Status: SHIPPED | OUTPATIENT
Start: 2018-01-30 | End: 2018-11-27 | Stop reason: SDUPTHER

## 2018-01-30 RX ORDER — QUETIAPINE FUMARATE 300 MG/1
300 TABLET, FILM COATED ORAL NIGHTLY
Qty: 30 TABLET | Refills: 5 | Status: SHIPPED | OUTPATIENT
Start: 2018-01-30 | End: 2018-05-01 | Stop reason: SDUPTHER

## 2018-01-30 RX ORDER — ERGOCALCIFEROL 1.25 MG/1
CAPSULE ORAL
Qty: 4 CAPSULE | Refills: 4 | Status: SHIPPED | OUTPATIENT
Start: 2018-01-30 | End: 2018-01-30 | Stop reason: SDUPTHER

## 2018-01-30 RX ORDER — MELATONIN 10 MG
80 CAPSULE ORAL NIGHTLY
COMMUNITY

## 2018-01-30 RX ORDER — ERGOCALCIFEROL 1.25 MG/1
50000 CAPSULE ORAL WEEKLY
Qty: 12 CAPSULE | Refills: 0 | Status: SHIPPED | OUTPATIENT
Start: 2018-01-30 | End: 2018-09-28 | Stop reason: SDUPTHER

## 2018-01-30 RX ORDER — SIMVASTATIN 40 MG
40 TABLET ORAL NIGHTLY
Qty: 90 TABLET | Refills: 3 | Status: SHIPPED | OUTPATIENT
Start: 2018-01-30 | End: 2018-10-01 | Stop reason: SDUPTHER

## 2018-01-30 RX ORDER — SIMVASTATIN 40 MG
TABLET ORAL
Qty: 90 TABLET | Refills: 2 | Status: SHIPPED | OUTPATIENT
Start: 2018-01-30 | End: 2018-01-30 | Stop reason: SDUPTHER

## 2018-01-30 RX ORDER — DIPHENHYDRAMINE HCL 25 MG
50 TABLET ORAL
COMMUNITY

## 2018-01-30 RX ORDER — ASPIRIN 81 MG/1
81 TABLET ORAL DAILY
Qty: 90 TABLET | Refills: 3 | Status: SHIPPED | OUTPATIENT
Start: 2018-01-30 | End: 2019-02-18 | Stop reason: SDUPTHER

## 2018-01-30 NOTE — PROGRESS NOTES
background radiation exposure from everyday life. Starting screening at age 53 is not likely to increase cancer risk from radiation exposure. Patients with comorbidities resulting in life expectancy of < 10 years, or that would preclude treatment of an abnormality identified on CT, should not be screened due to lack of benefit. To obtain maximal benefit from this screening, smoking cessation and long-term abstinence from smoking is critical        Assessment/Plan:  Liss was seen today for established new doctor. Diagnoses and all orders for this visit:    Encounter to establish care  - H&P reviewed and scanned into EMR  - Oriented to provider and practice    Bipolar affective disorder, remission status unspecified (HCC)  -     QUEtiapine (SEROQUEL) 300 MG tablet; Take 1 tablet by mouth nightly  -     buPROPion (WELLBUTRIN SR) 100 MG extended release tablet; Take 1 tablet by mouth 2 times daily  -     Amb External Referral To Psychiatry    Recurrent major depressive episodes (HCC)  -     QUEtiapine (SEROQUEL) 300 MG tablet; Take 1 tablet by mouth nightly  -     buPROPion (WELLBUTRIN SR) 100 MG extended release tablet; Take 1 tablet by mouth 2 times daily  -     Amb External Referral To Psychiatry    Posttraumatic stress disorder  -     buPROPion (WELLBUTRIN SR) 100 MG extended release tablet; Take 1 tablet by mouth 2 times daily  -     Amb External Referral To Psychiatry    Insomnia due to mental disorder  -     QUEtiapine (SEROQUEL) 300 MG tablet; Take 1 tablet by mouth nightly    Uncontrolled type 2 diabetes mellitus without complication, with long-term current use of insulin (HCC)    Essential hypertension    Mixed hyperlipidemia  -     simvastatin (ZOCOR) 40 MG tablet; Take 1 tablet by mouth nightly    Vitamin D deficiency  -     vitamin D (ERGOCALCIFEROL) 52408 units CAPS capsule;  Take 1 capsule by mouth once a week    Benign prostatic hyperplasia with urinary frequency    Nicotine dependence,

## 2018-01-31 ASSESSMENT — ENCOUNTER SYMPTOMS
SHORTNESS OF BREATH: 0
GASTROINTESTINAL NEGATIVE: 1

## 2018-02-23 ENCOUNTER — OFFICE VISIT (OUTPATIENT)
Dept: ENDOCRINOLOGY | Age: 63
End: 2018-02-23

## 2018-02-23 ENCOUNTER — TELEPHONE (OUTPATIENT)
Dept: ENDOCRINOLOGY | Age: 63
End: 2018-02-23

## 2018-02-23 VITALS
BODY MASS INDEX: 24.41 KG/M2 | SYSTOLIC BLOOD PRESSURE: 133 MMHG | OXYGEN SATURATION: 94 % | HEART RATE: 86 BPM | DIASTOLIC BLOOD PRESSURE: 64 MMHG | HEIGHT: 74 IN | WEIGHT: 190.2 LBS

## 2018-02-23 DIAGNOSIS — E78.2 MIXED HYPERLIPIDEMIA: ICD-10-CM

## 2018-02-23 DIAGNOSIS — E55.9 VITAMIN D DEFICIENCY: ICD-10-CM

## 2018-02-23 DIAGNOSIS — I10 ESSENTIAL HYPERTENSION: ICD-10-CM

## 2018-02-23 LAB — HBA1C MFR BLD: 8.7 %

## 2018-02-23 PROCEDURE — 83036 HEMOGLOBIN GLYCOSYLATED A1C: CPT | Performed by: NURSE PRACTITIONER

## 2018-02-23 PROCEDURE — 99214 OFFICE O/P EST MOD 30 MIN: CPT | Performed by: NURSE PRACTITIONER

## 2018-02-23 RX ORDER — FLASH GLUCOSE SENSOR
1 KIT MISCELLANEOUS PRN
Qty: 1 DEVICE | Refills: 0 | Status: SHIPPED | OUTPATIENT
Start: 2018-02-23 | End: 2018-02-26 | Stop reason: SDUPTHER

## 2018-02-23 RX ORDER — BLOOD-GLUCOSE SENSOR
1 EACH MISCELLANEOUS
Qty: 1 EACH | Refills: 3 | Status: SHIPPED | OUTPATIENT
Start: 2018-02-23 | End: 2018-02-23 | Stop reason: CLARIF

## 2018-02-23 RX ORDER — FLASH GLUCOSE SENSOR
1 KIT MISCELLANEOUS PRN
Qty: 3 EACH | Refills: 3 | Status: SHIPPED | OUTPATIENT
Start: 2018-02-23 | End: 2018-02-26 | Stop reason: SDUPTHER

## 2018-02-23 RX ORDER — BLOOD-GLUCOSE TRANSMITTER
1 EACH MISCELLANEOUS
Qty: 1 EACH | Refills: 1 | Status: SHIPPED | OUTPATIENT
Start: 2018-02-23 | End: 2018-02-23 | Stop reason: CLARIF

## 2018-02-23 ASSESSMENT — PATIENT HEALTH QUESTIONNAIRE - PHQ9
5. POOR APPETITE OR OVEREATING: 0
7. TROUBLE CONCENTRATING ON THINGS, SUCH AS READING THE NEWSPAPER OR WATCHING TELEVISION: 0
8. MOVING OR SPEAKING SO SLOWLY THAT OTHER PEOPLE COULD HAVE NOTICED. OR THE OPPOSITE, BEING SO FIGETY OR RESTLESS THAT YOU HAVE BEEN MOVING AROUND A LOT MORE THAN USUAL: 1
3. TROUBLE FALLING OR STAYING ASLEEP: 3
9. THOUGHTS THAT YOU WOULD BE BETTER OFF DEAD, OR OF HURTING YOURSELF: 1
6. FEELING BAD ABOUT YOURSELF - OR THAT YOU ARE A FAILURE OR HAVE LET YOURSELF OR YOUR FAMILY DOWN: 1
10. IF YOU CHECKED OFF ANY PROBLEMS, HOW DIFFICULT HAVE THESE PROBLEMS MADE IT FOR YOU TO DO YOUR WORK, TAKE CARE OF THINGS AT HOME, OR GET ALONG WITH OTHER PEOPLE: 2
2. FEELING DOWN, DEPRESSED OR HOPELESS: 3
1. LITTLE INTEREST OR PLEASURE IN DOING THINGS: 1
SUM OF ALL RESPONSES TO PHQ9 QUESTIONS 1 & 2: 4
SUM OF ALL RESPONSES TO PHQ QUESTIONS 1-9: 13
4. FEELING TIRED OR HAVING LITTLE ENERGY: 3

## 2018-02-23 ASSESSMENT — ENCOUNTER SYMPTOMS
CONSTIPATION: 0
NAUSEA: 0
DIARRHEA: 0
SHORTNESS OF BREATH: 0
EYE PAIN: 0
COLOR CHANGE: 0

## 2018-02-23 NOTE — TELEPHONE ENCOUNTER
[facility] called to say the supplies that were ordered for this patient are not covered by pt's insurance. Said pt needs to go another route. If you have any questions, please call them back at [phone].

## 2018-02-23 NOTE — PROGRESS NOTES
corneal infection, left eye s/p cataract 20/25 per patient  · Neuropathy:mild tingling in the feet. Managed per podiatrist.  · Nephropathy:  Component    Latest Ref Rng & Units 10/26/2017   Microalbumin, Random Urine    <2.0 mg/dL <1.20   Creatinine, Ur    39.0 - 259.0 mg/dL 18.9 (L)   Microalbumin Creatinine Ratio    0.0 - 30.0 mg/g see below     Diabetic Health Maintenance   · Last Eye Exam: follows Dr Yesika Maier annually  · Last Foot exam: Sees podiatrist every 9 weeks  · Has patient seen a dietitian? Not recently  · Current Exercise: limited mobility in wheelchair, can transfer from chair to bed independently    Risk Factors  Smoker: started at 15 yrs old, quit 3 years ago but recently started back. Trying to quit again with help of e-cigarette. ETOH: 1-2 beer every night, uses regular. Tonblade Donath not want to switch to a low autumn version ( \" few pleasures in life\")   Understands how to compensate for alcohol related hypoglycemia     Vitamin D deficiency: Currently is on 50K unit weekly. Current complaints include fatigue on daily basis. Last vitamin D level is:  Lab Results   Component Value Date    VITD25 38.4 10/26/2017    VITD25 18.1 03/03/2017     Hyperlipidemia: Currently is on Zocor 40 mg.  Controlled  Lab Results   Component Value Date    CHOL 121 03/03/2017     Lab Results   Component Value Date    TRIG 73 03/03/2017     Lab Results   Component Value Date    HDL 48 03/03/2017     Lab Results   Component Value Date    LDLCALC 58 03/03/2017     No results found for: LDLDIRECT  No results found for: CHOLHDLRATIO.5    Past Medical History:   Diagnosis Date    Allergic rhinitis     arthri     Arthritis     lumbar spine    CAD (coronary artery disease)     Chronic kidney disease     retention, bph    Constipation     DM (diabetes mellitus) ([address]     Generalized pain     GERD (gastroesophageal reflux disease)     constipation    Heart attack     Heart disease     Hyperlipidemia     Movement disorder     lumbar spine arthritis    Psychiatric problem     ptsd, anxiety, depression    Urinary retention      Family History   Problem Relation Age of Onset   Elenore Grams / Stillbirths Mother     Cancer Mother     Heart Disease Father     Diabetes Brother     Diabetes Maternal Uncle     Diabetes Paternal Aunt      Review of Systems   Constitutional: Negative for activity change, appetite change, diaphoresis, fever and unexpected weight change. HENT: Negative for dental problem. Eyes: Positive for visual disturbance. Negative for pain. Respiratory: Negative for shortness of breath. Cardiovascular: Negative for palpitations and leg swelling. Gastrointestinal: Negative for constipation, diarrhea and nausea. Endocrine: Negative for cold intolerance and heat intolerance. Genitourinary: Negative for frequency and urgency. Musculoskeletal: Positive for gait problem and myalgias. Negative for arthralgias and joint swelling. Skin: Negative for color change. Neurological: Negative for numbness. Psychiatric/Behavioral: Negative for dysphoric mood and sleep disturbance. Vitals:    02/23/18 0954   BP: 133/64   Site: Right Arm   Position: Sitting   Cuff Size: Large Adult   Pulse: 86   SpO2: 94%   Weight: 190 lb 3.2 oz (86.3 kg)   Height: 6' 2\" (1.88 m)     Physical Exam   Constitutional: He is oriented to person, place, and time. He appears well-developed and well-nourished. Wheelchair, able to bear weight for transferring but has poor balance   Eyes: Pupils are equal, round, and reactive to light. Right eye enucleated   Neck: Normal range of motion. No thyromegaly present. Cardiovascular: Normal rate, regular rhythm and normal heart sounds. Pulmonary/Chest: Effort normal and breath sounds normal.   Abdominal: He exhibits no distension. Musculoskeletal: Normal range of motion. He exhibits no edema. Neurological: He is alert and oriented to person, place, and time. No sensory deficit.    Skin: Skin is warm and dry. No skin changes or evidence of trauma. Psychiatric: He has a normal mood and affect. His behavior is normal. Thought content normal.   Vitals reviewed. Skeletal foot exam: declined exam    Assessment  Koki Abdalla is an uncontrolled diabetic for 23 + years with long term insulin use for > 11 years associated with diabetic neuropathy, hypertension, hyperlipidemia, limited mobility, very poor vision (post glaucoma) and depression. Plan  Problem List Items Addressed This Visit     Essential hypertension     Controlled at visit, managed per PCP         Hyperlipidemia     LDL goal is  <100; levels in ref range at current dose         Vitamin D deficiency     On supplementation  Recent levels at 38 , up from 19. Continue current regimen         Diabetes type 2, uncontrolled (HCC) - Primary     A1c is higher than previous 7.8% to 8.7 % today  Significant fluctuation in sugar levels, with frequent hypoglycemic episodes in 50s, and highs in mid 400s  Checks 4-6 times daily, injects 4 times a day, patient has very poor eyesight  Has not made much headway with procuring Dexcom likely related to insurance coverage issues  Will check if free style [place] is an option , rx sent for sensor and reader, information provided to the patient     Patient reports diabetes management fatigue and reports he does not check at times/does not cover at times as it is too much work. Understands how to cover. Knows to refer to carb counting resource provided online. Snacks late night/middle of the night, so estimating fasting AM for levemir titration is challenging for patient, currently on 74 U QHS  Humalog on moderate dose regimen SSI , rec to cover for meals , snacks and beverages; I:C ration 1: 10  F/u in 3 months, may return sooner if sensor approved  Potential insulin pump client; will review at next visit.          Relevant Medications    insulin detemir (LEVEMIR FLEXTOUCH) 100 UNIT/ML injection pen    Other

## 2018-02-26 RX ORDER — FLASH GLUCOSE SENSOR
1 KIT MISCELLANEOUS PRN
Qty: 3 EACH | Refills: 3 | Status: SHIPPED | OUTPATIENT
Start: 2018-02-26 | End: 2018-05-25

## 2018-02-26 RX ORDER — FLASH GLUCOSE SENSOR
1 KIT MISCELLANEOUS PRN
Qty: 1 DEVICE | Refills: 0 | Status: SHIPPED | OUTPATIENT
Start: 2018-02-26 | End: 2018-05-25

## 2018-02-28 RX ORDER — DOCUSATE SODIUM 100 MG/1
CAPSULE, LIQUID FILLED ORAL
Qty: 180 CAPSULE | Refills: 1 | OUTPATIENT
Start: 2018-02-28

## 2018-02-28 RX ORDER — INSULIN ASPART 100 [IU]/ML
INJECTION, SOLUTION INTRAVENOUS; SUBCUTANEOUS
Qty: 15 PEN | Refills: 9 | OUTPATIENT
Start: 2018-02-28

## 2018-03-01 RX ORDER — PRIMIDONE 50 MG/1
50 TABLET ORAL NIGHTLY
Qty: 90 TABLET | Refills: 1 | Status: SHIPPED | OUTPATIENT
Start: 2018-03-01 | End: 2018-08-28 | Stop reason: SDUPTHER

## 2018-03-05 RX ORDER — DOCUSATE SODIUM 100 MG/1
CAPSULE, LIQUID FILLED ORAL
Qty: 180 CAPSULE | Refills: 0 | Status: SHIPPED | OUTPATIENT
Start: 2018-03-05 | End: 2018-06-29 | Stop reason: SDUPTHER

## 2018-04-12 ENCOUNTER — HOSPITAL ENCOUNTER (OUTPATIENT)
Dept: CT IMAGING | Age: 63
Discharge: HOME | End: 2018-04-12
Attending: NURSE PRACTITIONER | Admitting: NURSE PRACTITIONER

## 2018-04-12 DIAGNOSIS — F17.210 CIGARETTE SMOKER: ICD-10-CM

## 2018-04-12 DIAGNOSIS — F17.210 CIGARETTE NICOTINE DEPENDENCE, UNCOMPLICATED: ICD-10-CM

## 2018-04-16 ENCOUNTER — CASE MANAGEMENT (OUTPATIENT)
Dept: CASE MANAGEMENT | Age: 63
End: 2018-04-16

## 2018-04-16 DIAGNOSIS — J43.2 CENTRILOBULAR EMPHYSEMA (HCC): Primary | ICD-10-CM

## 2018-04-16 DIAGNOSIS — Z72.0 TOBACCO ABUSE: ICD-10-CM

## 2018-04-16 DIAGNOSIS — R93.89 ABNORMAL CT OF THE CHEST: ICD-10-CM

## 2018-05-01 ENCOUNTER — OFFICE VISIT (OUTPATIENT)
Dept: INTERNAL MEDICINE CLINIC | Age: 63
End: 2018-05-01

## 2018-05-01 VITALS — HEART RATE: 88 BPM | DIASTOLIC BLOOD PRESSURE: 72 MMHG | SYSTOLIC BLOOD PRESSURE: 122 MMHG

## 2018-05-01 DIAGNOSIS — I10 ESSENTIAL HYPERTENSION: ICD-10-CM

## 2018-05-01 DIAGNOSIS — E11.9 TYPE 2 DIABETES MELLITUS WITHOUT COMPLICATION, WITH LONG-TERM CURRENT USE OF INSULIN (HCC): ICD-10-CM

## 2018-05-01 DIAGNOSIS — Z00.00 ANNUAL PHYSICAL EXAM: Primary | ICD-10-CM

## 2018-05-01 DIAGNOSIS — F31.9 BIPOLAR AFFECTIVE DISORDER, REMISSION STATUS UNSPECIFIED (HCC): ICD-10-CM

## 2018-05-01 DIAGNOSIS — F17.218 CIGARETTE NICOTINE DEPENDENCE WITH OTHER NICOTINE-INDUCED DISORDER: ICD-10-CM

## 2018-05-01 DIAGNOSIS — Z11.4 SCREENING FOR HIV (HUMAN IMMUNODEFICIENCY VIRUS): ICD-10-CM

## 2018-05-01 DIAGNOSIS — N40.1 BENIGN PROSTATIC HYPERPLASIA WITH URINARY FREQUENCY: ICD-10-CM

## 2018-05-01 DIAGNOSIS — R35.0 BENIGN PROSTATIC HYPERPLASIA WITH URINARY FREQUENCY: ICD-10-CM

## 2018-05-01 DIAGNOSIS — I24.0 ISCHEMIC HEART DISEASE DUE TO CORONARY ARTERY OBSTRUCTION (HCC): ICD-10-CM

## 2018-05-01 DIAGNOSIS — R93.89 ABNORMAL CT OF THE CHEST: ICD-10-CM

## 2018-05-01 DIAGNOSIS — I25.9 ISCHEMIC HEART DISEASE DUE TO CORONARY ARTERY OBSTRUCTION (HCC): ICD-10-CM

## 2018-05-01 DIAGNOSIS — F51.05 INSOMNIA DUE TO MENTAL DISORDER: ICD-10-CM

## 2018-05-01 DIAGNOSIS — F33.9 RECURRENT MAJOR DEPRESSIVE EPISODES (HCC): ICD-10-CM

## 2018-05-01 DIAGNOSIS — Z79.4 TYPE 2 DIABETES MELLITUS WITHOUT COMPLICATION, WITH LONG-TERM CURRENT USE OF INSULIN (HCC): ICD-10-CM

## 2018-05-01 DIAGNOSIS — Z11.59 NEED FOR HEPATITIS C SCREENING TEST: ICD-10-CM

## 2018-05-01 LAB
A/G RATIO: 1.4 (ref 1.1–2.2)
ALBUMIN SERPL-MCNC: 4.6 G/DL (ref 3.4–5)
ALP BLD-CCNC: 91 U/L (ref 40–129)
ALT SERPL-CCNC: 14 U/L (ref 10–40)
ANION GAP SERPL CALCULATED.3IONS-SCNC: 16 MMOL/L (ref 3–16)
AST SERPL-CCNC: 15 U/L (ref 15–37)
BASOPHILS ABSOLUTE: 0.1 K/UL (ref 0–0.2)
BASOPHILS RELATIVE PERCENT: 0.8 %
BILIRUB SERPL-MCNC: <0.2 MG/DL (ref 0–1)
BUN BLDV-MCNC: 16 MG/DL (ref 7–20)
CALCIUM SERPL-MCNC: 9.8 MG/DL (ref 8.3–10.6)
CHLORIDE BLD-SCNC: 97 MMOL/L (ref 99–110)
CHOLESTEROL, TOTAL: 121 MG/DL (ref 0–199)
CO2: 27 MMOL/L (ref 21–32)
CREAT SERPL-MCNC: 0.6 MG/DL (ref 0.8–1.3)
EOSINOPHILS ABSOLUTE: 0.5 K/UL (ref 0–0.6)
EOSINOPHILS RELATIVE PERCENT: 4.1 %
GFR AFRICAN AMERICAN: >60
GFR NON-AFRICAN AMERICAN: >60
GLOBULIN: 3.2 G/DL
GLUCOSE BLD-MCNC: 85 MG/DL (ref 70–99)
HCT VFR BLD CALC: 43.3 % (ref 40.5–52.5)
HDLC SERPL-MCNC: 50 MG/DL (ref 40–60)
HEMOGLOBIN: 14.5 G/DL (ref 13.5–17.5)
HEPATITIS C ANTIBODY INTERPRETATION: NORMAL
LDL CHOLESTEROL CALCULATED: 49 MG/DL
LYMPHOCYTES ABSOLUTE: 3.6 K/UL (ref 1–5.1)
LYMPHOCYTES RELATIVE PERCENT: 30.5 %
MCH RBC QN AUTO: 30.9 PG (ref 26–34)
MCHC RBC AUTO-ENTMCNC: 33.4 G/DL (ref 31–36)
MCV RBC AUTO: 92.4 FL (ref 80–100)
MONOCYTES ABSOLUTE: 0.8 K/UL (ref 0–1.3)
MONOCYTES RELATIVE PERCENT: 7 %
NEUTROPHILS ABSOLUTE: 6.8 K/UL (ref 1.7–7.7)
NEUTROPHILS RELATIVE PERCENT: 57.6 %
PDW BLD-RTO: 13.9 % (ref 12.4–15.4)
PLATELET # BLD: 186 K/UL (ref 135–450)
PMV BLD AUTO: 8.6 FL (ref 5–10.5)
POTASSIUM SERPL-SCNC: 4.2 MMOL/L (ref 3.5–5.1)
RBC # BLD: 4.68 M/UL (ref 4.2–5.9)
SODIUM BLD-SCNC: 140 MMOL/L (ref 136–145)
TOTAL PROTEIN: 7.8 G/DL (ref 6.4–8.2)
TRIGL SERPL-MCNC: 110 MG/DL (ref 0–150)
VLDLC SERPL CALC-MCNC: 22 MG/DL
WBC # BLD: 11.7 K/UL (ref 4–11)

## 2018-05-01 PROCEDURE — 99214 OFFICE O/P EST MOD 30 MIN: CPT | Performed by: NURSE PRACTITIONER

## 2018-05-01 RX ORDER — QUETIAPINE FUMARATE 300 MG/1
300 TABLET, FILM COATED ORAL NIGHTLY
Qty: 30 TABLET | Refills: 5 | Status: SHIPPED | OUTPATIENT
Start: 2018-05-01 | End: 2018-08-28 | Stop reason: SDUPTHER

## 2018-05-01 RX ORDER — METFORMIN HYDROCHLORIDE 500 MG/1
2000 TABLET, EXTENDED RELEASE ORAL
Qty: 120 TABLET | Refills: 11 | Status: SHIPPED | OUTPATIENT
Start: 2018-05-01 | End: 2019-05-21 | Stop reason: SDUPTHER

## 2018-05-01 RX ORDER — TAMSULOSIN HYDROCHLORIDE 0.4 MG/1
0.4 CAPSULE ORAL DAILY
Qty: 90 CAPSULE | Refills: 1 | Status: SHIPPED | OUTPATIENT
Start: 2018-05-01 | End: 2018-08-28 | Stop reason: SDUPTHER

## 2018-05-02 PROBLEM — F17.200 NICOTINE DEPENDENCE: Status: ACTIVE | Noted: 2018-05-02

## 2018-05-02 LAB
HIV AG/AB: NORMAL
HIV ANTIGEN: NORMAL
HIV-1 ANTIBODY: NORMAL
HIV-2 AB: NORMAL

## 2018-05-02 ASSESSMENT — ENCOUNTER SYMPTOMS
WHEEZING: 0
SHORTNESS OF BREATH: 0
CHEST TIGHTNESS: 0
GASTROINTESTINAL NEGATIVE: 1
RESPIRATORY NEGATIVE: 1

## 2018-05-24 ENCOUNTER — HOSPITAL ENCOUNTER (OUTPATIENT)
Dept: PULMONOLOGY | Age: 63
Discharge: HOME | End: 2018-05-24
Attending: NURSE PRACTITIONER | Admitting: NURSE PRACTITIONER

## 2018-05-24 DIAGNOSIS — J43.2 CENTRILOBULAR EMPHYSEMA (HCC): ICD-10-CM

## 2018-05-24 LAB
DLCO %PRED: NORMAL
DLCO PRE: NORMAL
FEF 25-75%-POST: NORMAL
FEF 25-75%-PRE: NORMAL
FEV1-POST: NORMAL
FEV1-PRE: NORMAL
FEV1/FVC-POST: NORMAL
FEV1/FVC-PRE: NORMAL
FVC-POST: NORMAL
FVC-PRE: NORMAL
MEP: NORMAL
MIP: NORMAL
MVV %PRED-PRE: NORMAL
MVV-PRE: NORMAL
TLC %PRED: NORMAL
TLC PRE: NORMAL

## 2018-05-24 PROCEDURE — 99999 PR OFFICE/OUTPT VISIT,PROCEDURE ONLY: CPT | Performed by: INTERNAL MEDICINE

## 2018-05-25 ENCOUNTER — OFFICE VISIT (OUTPATIENT)
Dept: ENDOCRINOLOGY | Age: 63
End: 2018-05-25

## 2018-05-25 VITALS
WEIGHT: 194 LBS | HEART RATE: 92 BPM | SYSTOLIC BLOOD PRESSURE: 143 MMHG | HEIGHT: 74 IN | DIASTOLIC BLOOD PRESSURE: 88 MMHG | BODY MASS INDEX: 24.9 KG/M2 | OXYGEN SATURATION: 90 %

## 2018-05-25 DIAGNOSIS — I10 ESSENTIAL HYPERTENSION: ICD-10-CM

## 2018-05-25 DIAGNOSIS — E78.2 MIXED HYPERLIPIDEMIA: ICD-10-CM

## 2018-05-25 DIAGNOSIS — E55.9 VITAMIN D DEFICIENCY: ICD-10-CM

## 2018-05-25 LAB — HBA1C MFR BLD: 9 %

## 2018-05-25 PROCEDURE — G0444 DEPRESSION SCREEN ANNUAL: HCPCS | Performed by: NURSE PRACTITIONER

## 2018-05-25 PROCEDURE — 83036 HEMOGLOBIN GLYCOSYLATED A1C: CPT | Performed by: NURSE PRACTITIONER

## 2018-05-25 PROCEDURE — 99214 OFFICE O/P EST MOD 30 MIN: CPT | Performed by: NURSE PRACTITIONER

## 2018-05-25 ASSESSMENT — ENCOUNTER SYMPTOMS
NAUSEA: 0
DIARRHEA: 0
SHORTNESS OF BREATH: 0
COLOR CHANGE: 0
EYE PAIN: 0
CONSTIPATION: 0

## 2018-05-25 ASSESSMENT — PATIENT HEALTH QUESTIONNAIRE - PHQ9
10. IF YOU CHECKED OFF ANY PROBLEMS, HOW DIFFICULT HAVE THESE PROBLEMS MADE IT FOR YOU TO DO YOUR WORK, TAKE CARE OF THINGS AT HOME, OR GET ALONG WITH OTHER PEOPLE: 2
5. POOR APPETITE OR OVEREATING: 2
7. TROUBLE CONCENTRATING ON THINGS, SUCH AS READING THE NEWSPAPER OR WATCHING TELEVISION: 3
8. MOVING OR SPEAKING SO SLOWLY THAT OTHER PEOPLE COULD HAVE NOTICED. OR THE OPPOSITE, BEING SO FIGETY OR RESTLESS THAT YOU HAVE BEEN MOVING AROUND A LOT MORE THAN USUAL: 2
SUM OF ALL RESPONSES TO PHQ QUESTIONS 1-9: 18
3. TROUBLE FALLING OR STAYING ASLEEP: 3
1. LITTLE INTEREST OR PLEASURE IN DOING THINGS: 0
SUM OF ALL RESPONSES TO PHQ9 QUESTIONS 1 & 2: 3
9. THOUGHTS THAT YOU WOULD BE BETTER OFF DEAD, OR OF HURTING YOURSELF: 2
2. FEELING DOWN, DEPRESSED OR HOPELESS: 3
4. FEELING TIRED OR HAVING LITTLE ENERGY: 3

## 2018-05-28 DIAGNOSIS — R91.8 ABNORMAL CT LUNG SCREENING: Primary | ICD-10-CM

## 2018-05-29 ENCOUNTER — OFFICE VISIT (OUTPATIENT)
Dept: INTERNAL MEDICINE CLINIC | Age: 63
End: 2018-05-29

## 2018-05-29 VITALS — HEART RATE: 92 BPM | SYSTOLIC BLOOD PRESSURE: 146 MMHG | OXYGEN SATURATION: 92 % | DIASTOLIC BLOOD PRESSURE: 92 MMHG

## 2018-05-29 DIAGNOSIS — I10 ESSENTIAL HYPERTENSION: ICD-10-CM

## 2018-05-29 DIAGNOSIS — H61.23 BILATERAL IMPACTED CERUMEN: Primary | ICD-10-CM

## 2018-05-29 DIAGNOSIS — J43.2 CENTRILOBULAR EMPHYSEMA (HCC): ICD-10-CM

## 2018-05-29 DIAGNOSIS — R91.8 ABNORMAL CT LUNG SCREENING: ICD-10-CM

## 2018-05-29 LAB
ANION GAP SERPL CALCULATED.3IONS-SCNC: 20 MMOL/L (ref 3–16)
BUN BLDV-MCNC: 17 MG/DL (ref 7–20)
CALCIUM SERPL-MCNC: 9.9 MG/DL (ref 8.3–10.6)
CHLORIDE BLD-SCNC: 92 MMOL/L (ref 99–110)
CO2: 23 MMOL/L (ref 21–32)
CREAT SERPL-MCNC: 0.7 MG/DL (ref 0.8–1.3)
GFR AFRICAN AMERICAN: >60
GFR NON-AFRICAN AMERICAN: >60
GLUCOSE BLD-MCNC: 399 MG/DL (ref 70–99)
POTASSIUM SERPL-SCNC: 4.8 MMOL/L (ref 3.5–5.1)
SODIUM BLD-SCNC: 135 MMOL/L (ref 136–145)

## 2018-05-29 PROCEDURE — 99213 OFFICE O/P EST LOW 20 MIN: CPT | Performed by: NURSE PRACTITIONER

## 2018-05-29 PROCEDURE — 69210 REMOVE IMPACTED EAR WAX UNI: CPT | Performed by: NURSE PRACTITIONER

## 2018-05-29 RX ORDER — LOSARTAN POTASSIUM 25 MG/1
25 TABLET ORAL DAILY
Qty: 30 TABLET | Refills: 5 | Status: SHIPPED | OUTPATIENT
Start: 2018-05-29 | End: 2018-11-26

## 2018-05-29 ASSESSMENT — ENCOUNTER SYMPTOMS: RESPIRATORY NEGATIVE: 1

## 2018-05-30 ENCOUNTER — TELEPHONE (OUTPATIENT)
Dept: ENDOCRINOLOGY | Age: 63
End: 2018-05-30

## 2018-05-31 ENCOUNTER — TELEPHONE (OUTPATIENT)
Dept: INTERNAL MEDICINE CLINIC | Age: 63
End: 2018-05-31

## 2018-05-31 ENCOUNTER — TELEPHONE (OUTPATIENT)
Dept: ENDOCRINOLOGY | Age: 63
End: 2018-05-31

## 2018-05-31 DIAGNOSIS — E11.9 TYPE 2 DIABETES MELLITUS WITHOUT COMPLICATION, WITH LONG-TERM CURRENT USE OF INSULIN (HCC): ICD-10-CM

## 2018-05-31 DIAGNOSIS — Z79.4 TYPE 2 DIABETES MELLITUS WITHOUT COMPLICATION, WITH LONG-TERM CURRENT USE OF INSULIN (HCC): ICD-10-CM

## 2018-06-01 LAB
QUANTIFERON (R) TB GOLD (INCUBATED): NEGATIVE
QUANTIFERON MITOGEN: 8.69 IU/ML
QUANTIFERON NIL: 0.02 IU/ML
QUANTIFERON TB AG MINUS NIL: 0.01 IU/ML (ref 0–0.34)

## 2018-06-01 RX ORDER — BLOOD-GLUCOSE SENSOR
1 EACH MISCELLANEOUS PRN
Qty: 1 EACH | Refills: 0 | Status: SHIPPED | OUTPATIENT
Start: 2018-06-01 | End: 2018-08-31

## 2018-06-01 RX ORDER — LANCETS
1 EACH MISCELLANEOUS 4 TIMES DAILY
Qty: 100 EACH | Refills: 6 | Status: SHIPPED | OUTPATIENT
Start: 2018-06-01 | End: 2018-08-31

## 2018-06-01 RX ORDER — BLOOD-GLUCOSE,RECEIVER,CONT
1 EACH MISCELLANEOUS PRN
Qty: 1 DEVICE | Refills: 0 | Status: SHIPPED | OUTPATIENT
Start: 2018-06-01 | End: 2018-08-31

## 2018-06-01 RX ORDER — BLOOD-GLUCOSE TRANSMITTER
1 EACH MISCELLANEOUS PRN
Qty: 1 EACH | Refills: 0 | Status: SHIPPED | OUTPATIENT
Start: 2018-06-01 | End: 2018-08-31

## 2018-06-29 RX ORDER — DOCUSATE SODIUM 100 MG/1
CAPSULE, LIQUID FILLED ORAL
Qty: 180 CAPSULE | Refills: 0 | Status: SHIPPED | OUTPATIENT
Start: 2018-06-29 | End: 2018-09-28 | Stop reason: SDUPTHER

## 2018-07-19 ENCOUNTER — TELEPHONE (OUTPATIENT)
Dept: ENDOCRINOLOGY | Age: 63
End: 2018-07-19

## 2018-07-19 NOTE — TELEPHONE ENCOUNTER
[place] with  [employer] called stating the Dexcom G5 has been denied again for a third time. [place] stated pt is very upset. I advised Rod Roxana that his insurance [employer] does not have the best coverage and pt does not have pharmacy benefits either. I spoke with Leti López with Dexcom and we spoke with Amanda Martinez on a three way call at [facility] and he stated that he will resubmit and see if there are any DME suppliers that will  his insurance. Aven advised if not then pt will have to go with out of pocket purchase. Amanda Martinez will speak with pt and find out where to send script for the Dexom G5. Amanda Martinez will keep me updated. Thanks!

## 2018-08-21 ENCOUNTER — TELEPHONE (OUTPATIENT)
Dept: ENDOCRINOLOGY | Age: 63
End: 2018-08-21

## 2018-08-28 ENCOUNTER — OFFICE VISIT (OUTPATIENT)
Dept: INTERNAL MEDICINE CLINIC | Age: 63
End: 2018-08-28

## 2018-08-28 VITALS — HEART RATE: 92 BPM | SYSTOLIC BLOOD PRESSURE: 136 MMHG | DIASTOLIC BLOOD PRESSURE: 84 MMHG

## 2018-08-28 DIAGNOSIS — F33.9 RECURRENT MAJOR DEPRESSIVE EPISODES (HCC): ICD-10-CM

## 2018-08-28 DIAGNOSIS — Z13.31 POSITIVE DEPRESSION SCREENING: ICD-10-CM

## 2018-08-28 DIAGNOSIS — R35.0 BENIGN PROSTATIC HYPERPLASIA WITH URINARY FREQUENCY: ICD-10-CM

## 2018-08-28 DIAGNOSIS — N40.1 BENIGN PROSTATIC HYPERPLASIA WITH URINARY FREQUENCY: ICD-10-CM

## 2018-08-28 DIAGNOSIS — F31.9 BIPOLAR AFFECTIVE DISORDER, REMISSION STATUS UNSPECIFIED (HCC): ICD-10-CM

## 2018-08-28 DIAGNOSIS — F51.05 INSOMNIA DUE TO MENTAL DISORDER: ICD-10-CM

## 2018-08-28 LAB — HBA1C MFR BLD: 12 %

## 2018-08-28 PROCEDURE — G8431 POS CLIN DEPRES SCRN F/U DOC: HCPCS | Performed by: NURSE PRACTITIONER

## 2018-08-28 PROCEDURE — 99214 OFFICE O/P EST MOD 30 MIN: CPT | Performed by: NURSE PRACTITIONER

## 2018-08-28 PROCEDURE — 83036 HEMOGLOBIN GLYCOSYLATED A1C: CPT | Performed by: NURSE PRACTITIONER

## 2018-08-28 RX ORDER — TAMSULOSIN HYDROCHLORIDE 0.4 MG/1
0.4 CAPSULE ORAL DAILY
Qty: 90 CAPSULE | Refills: 1 | Status: SHIPPED | OUTPATIENT
Start: 2018-08-28 | End: 2019-01-30 | Stop reason: SDUPTHER

## 2018-08-28 RX ORDER — PRIMIDONE 50 MG/1
50 TABLET ORAL NIGHTLY
Qty: 90 TABLET | Refills: 1 | Status: SHIPPED | OUTPATIENT
Start: 2018-08-28 | End: 2018-11-27 | Stop reason: SDUPTHER

## 2018-08-28 RX ORDER — QUETIAPINE FUMARATE 300 MG/1
300 TABLET, FILM COATED ORAL NIGHTLY
Qty: 30 TABLET | Refills: 5 | Status: SHIPPED | OUTPATIENT
Start: 2018-08-28 | End: 2019-01-30 | Stop reason: SDUPTHER

## 2018-08-29 ASSESSMENT — ENCOUNTER SYMPTOMS
GASTROINTESTINAL NEGATIVE: 1
RESPIRATORY NEGATIVE: 1

## 2018-08-29 NOTE — PROGRESS NOTES
SMART) w/Device KIT 1 Units by Does not apply route 4 times daily 1 kit 0    Continuous Blood Gluc Sensor (DEXCOM G6 SENSOR) MISC 1 Units by Does not apply route as needed (as needed) 1 each 0    Continuous Blood Gluc  (DEXCOM G6 ) REEMA 1 Units by Does not apply route as needed (as needed) 1 Device 0    Continuous Blood Gluc Transmit (DEXCOM G6 TRANSMITTER) MISC 1 Units by Does not apply route as needed (as needed) 1 each 0    ONE TOUCH ULTRASOFT LANCETS MISC 1 each by Does not apply route 4 times daily 100 each 6    glucose blood VI test strips (EXACTECH TEST) strip 4 times daily. 100 strip 6    insulin detemir (LEVEMIR FLEXTOUCH) 100 UNIT/ML injection pen Inject 70 Units into the skin nightly 15 pen 5    losartan (COZAAR) 25 MG tablet Take 1 tablet by mouth daily 30 tablet 5    metFORMIN (GLUCOPHAGE XR) 500 MG extended release tablet Take 4 tablets by mouth daily (with breakfast) 120 tablet 11    insulin aspart (NOVOLOG FLEXPEN) 100 UNIT/ML injection pen INJECT UP  UNITS BYSUBCUTANEOUS ROUTE THREE TIMES A DAY AS DIRECTED 5 pen 10    Melatonin 10 MG CAPS Take 60 mg by mouth nightly       diphenhydrAMINE (BENADRYL) 25 MG tablet Take 25 mg by mouth nightly as needed for Sleep      simvastatin (ZOCOR) 40 MG tablet Take 1 tablet by mouth nightly 90 tablet 3    vitamin D (ERGOCALCIFEROL) 28535 units CAPS capsule Take 1 capsule by mouth once a week 12 capsule 0    aspirin (ASPIRIN LOW DOSE) 81 MG EC tablet Take 1 tablet by mouth daily 90 tablet 3    bisacodyl (BISACODYL) 5 MG EC tablet Take 1 tablet by mouth daily as needed for Constipation 30 tablet 11    buPROPion (WELLBUTRIN SR) 100 MG extended release tablet Take 1 tablet by mouth 2 times daily (Patient taking differently: Take 100 mg by mouth daily ) 180 tablet 3    glucose blood VI test strips (TRUE METRIX BLOOD GLUCOSE TEST) strip 3 each by Does not apply route 3 times daily As needed.  100 strip 11     No current

## 2018-08-31 ENCOUNTER — OFFICE VISIT (OUTPATIENT)
Dept: ENDOCRINOLOGY | Age: 63
End: 2018-08-31

## 2018-08-31 VITALS
HEIGHT: 74 IN | DIASTOLIC BLOOD PRESSURE: 73 MMHG | HEART RATE: 82 BPM | OXYGEN SATURATION: 96 % | WEIGHT: 189.6 LBS | SYSTOLIC BLOOD PRESSURE: 135 MMHG | BODY MASS INDEX: 24.33 KG/M2

## 2018-08-31 DIAGNOSIS — E78.2 MIXED HYPERLIPIDEMIA: ICD-10-CM

## 2018-08-31 DIAGNOSIS — F17.218 CIGARETTE NICOTINE DEPENDENCE WITH OTHER NICOTINE-INDUCED DISORDER: ICD-10-CM

## 2018-08-31 DIAGNOSIS — Z91.199 PATIENT NONADHERENCE: ICD-10-CM

## 2018-08-31 DIAGNOSIS — I10 ESSENTIAL HYPERTENSION: ICD-10-CM

## 2018-08-31 PROCEDURE — G0444 DEPRESSION SCREEN ANNUAL: HCPCS | Performed by: NURSE PRACTITIONER

## 2018-08-31 PROCEDURE — 99214 OFFICE O/P EST MOD 30 MIN: CPT | Performed by: NURSE PRACTITIONER

## 2018-08-31 RX ORDER — BLOOD-GLUCOSE METER
1 KIT MISCELLANEOUS
Qty: 1 DEVICE | Refills: 0 | Status: SHIPPED | OUTPATIENT
Start: 2018-08-31 | End: 2019-05-24 | Stop reason: ALTCHOICE

## 2018-08-31 RX ORDER — BLOOD-GLUCOSE TRANSMITTER
1 EACH MISCELLANEOUS PRN
Qty: 1 EACH | Refills: 0 | Status: SHIPPED | OUTPATIENT
Start: 2018-08-31 | End: 2018-11-26

## 2018-08-31 RX ORDER — BLOOD-GLUCOSE SENSOR
3 EACH MISCELLANEOUS 2 TIMES DAILY
Qty: 3 EACH | Refills: 0 | Status: SHIPPED | OUTPATIENT
Start: 2018-08-31 | End: 2018-11-26

## 2018-08-31 RX ORDER — BLOOD-GLUCOSE,RECEIVER,CONT
1 EACH MISCELLANEOUS DAILY
Qty: 1 DEVICE | Refills: 0 | Status: SHIPPED | OUTPATIENT
Start: 2018-08-31 | End: 2018-11-26

## 2018-08-31 ASSESSMENT — PATIENT HEALTH QUESTIONNAIRE - PHQ9
8. MOVING OR SPEAKING SO SLOWLY THAT OTHER PEOPLE COULD HAVE NOTICED. OR THE OPPOSITE, BEING SO FIGETY OR RESTLESS THAT YOU HAVE BEEN MOVING AROUND A LOT MORE THAN USUAL: 0
10. IF YOU CHECKED OFF ANY PROBLEMS, HOW DIFFICULT HAVE THESE PROBLEMS MADE IT FOR YOU TO DO YOUR WORK, TAKE CARE OF THINGS AT HOME, OR GET ALONG WITH OTHER PEOPLE: 2
7. TROUBLE CONCENTRATING ON THINGS, SUCH AS READING THE NEWSPAPER OR WATCHING TELEVISION: 1
SUM OF ALL RESPONSES TO PHQ9 QUESTIONS 1 & 2: 4
2. FEELING DOWN, DEPRESSED OR HOPELESS: 3
5. POOR APPETITE OR OVEREATING: 2
6. FEELING BAD ABOUT YOURSELF - OR THAT YOU ARE A FAILURE OR HAVE LET YOURSELF OR YOUR FAMILY DOWN: 0
SUM OF ALL RESPONSES TO PHQ QUESTIONS 1-9: 13
1. LITTLE INTEREST OR PLEASURE IN DOING THINGS: 1
4. FEELING TIRED OR HAVING LITTLE ENERGY: 2
3. TROUBLE FALLING OR STAYING ASLEEP: 3
9. THOUGHTS THAT YOU WOULD BE BETTER OFF DEAD, OR OF HURTING YOURSELF: 1
SUM OF ALL RESPONSES TO PHQ QUESTIONS 1-9: 13

## 2018-08-31 ASSESSMENT — ENCOUNTER SYMPTOMS
DIARRHEA: 0
SHORTNESS OF BREATH: 0
CONSTIPATION: 0
NAUSEA: 0
EYE PAIN: 0
COLOR CHANGE: 0

## 2018-08-31 NOTE — PROGRESS NOTES
Endocrinology  [facility] [place]  Phone: [phone]   FAX: [phone]    Becky Chavez is a 60year old male with a history of Diabetes Mellitus type 2 for 24 years    Last A1C:   Lab Results   Component Value Date    LABA1C 12.0 08/28/2018    LABA1C 9.0 05/25/2018    LABA1C 8.7 02/23/2018     Last BP Readings:   BP Readings from Last 3 Encounters:   08/31/18 135/73   08/28/18 136/84   05/29/18 (!) 146/92     Last LDL:   Lab Results   Component Value Date    LDLCALC 49 05/01/2018     Aspirin Use: 81 mg    Tobacco History:    Smoking status: Current Every Day Smoker     Packs/day: 2.00     Years: 41.00     Types: E-Cigarettes     Start date: 7/1/2014     Last attempt to quit: 1/1/2018    Smokeless tobacco: Never Used      Comment: using E-cig with nicotine. Last cigarette 1/1/18.  Alcohol use Yes     Diabetes     Leopold Miser is a 60 y.o. male  with a history of Diabetes Mellitus type 2 since he was in his early forties. Disease course has been stable and he is interested in improving A1c further. Has tried meformin , glybuide and glipizide with unsatisfactory reduction in A1c. Has brought his sugar / insulin log. He is at high risk for adverse events related to hypoglycemia   - frequent hypoglycemia in readings  - frequent erratic readings due to  issues as he is legally blind (enucleated eye)  - lives alone with no support and it is a safety concern given his frequent hypoglycemia  - unable to access food/drink quickly as he is wheelchair bound and cannot get to help quickly          Interim HPI 8/31/18  Presents today without meter or log. Reports he has not been compliant a few weeks in recent past. Overwhelmed with the burden of disease management. Has been unsuccessful with appealing to his insurance for a Dexcom Sensor ( due to T2DM ) and is upset about that. Wife in nursing home and receiving subpar care which is also a major stressor. Continues to smoke.  Reports decreased muscle strength but can transfer to bed to wheelchair independently. Interim HPI 5/25/18  Pesents today without meter or log. Has not checked recently as fingers are very sore and meter readings are highly variable. Worried about frequent lows as is dosing insulin without checking    Interim HPI 2/23/2018  ED visit on 1/6/18 for insomnia. Out of his medication and had not sleep in 4 days. He has tried to get in touch with his provider Luke Jaquez cnp in [place]    Patient brought a detailed log of BG and insulin dosing  States his current meter is not reliable with minute to minute variation in readings on rechecks  Log shows frequent lows in the 50s,   He is testing 4-6 times a day on the days that he feels well enough to check  Lowest in past 2 weeks: 51(fasting)  Highest in past 2 weeks: 431 (fasting); often has beer at night  Hypoglycemia awareness and symptoms:has chills, visual disturbances that resolve with correction of sugar levels  Lives alone, limited mobility, wheel chair bound    Current Medication regimen:   Metformin 500 mg x 2 BID  Levemir 70 U in the AM  Novolog: I:C ratio is 1: 10 grams of carbs    Injecting at mealtimes x 3 per day; injects  atleast 4 times per day  Concerned about variability in his meter readings and if he is able to dose accurately  Covers for larger snacks when possible, but has trouble checking due to reasons above    Complications:  · Retinopathy : right eye enucleated s/p glaucoma and severe corneal infection, left eye s/p cataract per patient  · Neuropathy:mild tingling in the feet.  Managed per podiatrist.  · Nephropathy:  Component    Latest Ref Rng & Units 10/26/2017   Microalbumin, Random Urine    <2.0 mg/dL <1.20   Creatinine, Ur    39.0 - 259.0 mg/dL 18.9 (L)   Microalbumin Creatinine Ratio    0.0 - 30.0 mg/g see below     Diabetic Health Maintenance   · Last Eye Exam: follows Dr Anthony Simon annually, last > 1 year ago  · Last Foot exam: Sees podiatrist every 9 change, appetite change, diaphoresis, fever and unexpected weight change. HENT: Negative for dental problem. Eyes: Positive for visual disturbance. Negative for pain. Respiratory: Negative for shortness of breath. Cardiovascular: Negative for palpitations and leg swelling. Gastrointestinal: Negative for constipation, diarrhea and nausea. Endocrine: Negative for cold intolerance and heat intolerance. Genitourinary: Negative for frequency and urgency. Musculoskeletal: Positive for gait problem and myalgias. Negative for arthralgias and joint swelling. Skin: Negative for color change. Neurological: Negative for numbness. Psychiatric/Behavioral: Negative for dysphoric mood and sleep disturbance. Vitals:    08/31/18 1049   BP: 135/73   Site: Left Arm   Position: Sitting   Cuff Size: Medium Adult   Pulse: 82   SpO2: 96%   Weight: 189 lb 9.6 oz (86 kg)   Height: 6' 2\" (1.88 m)     Physical Exam   Constitutional: He is oriented to person, place, and time. He appears well-developed and well-nourished. Wheelchair, able to bear weight for transferring but has poor balance   Eyes: Pupils are equal, round, and reactive to light. Right eye enucleated   Neck: Normal range of motion. No thyromegaly present. Cardiovascular: Normal rate, regular rhythm and normal heart sounds. Pulmonary/Chest: Effort normal and breath sounds normal.   Abdominal: He exhibits no distension. Musculoskeletal: Normal range of motion. He exhibits no edema. Neurological: He is alert and oriented to person, place, and time. No sensory deficit. Skin: Skin is warm and dry. No skin changes or evidence of trauma. Psychiatric: He has a normal mood and affect. His behavior is normal. Thought content normal.   Vitals reviewed.     Skeletal foot exam: declined exam    Assessment  Arash Sands is an uncontrolled diabetic for 23 + years with long term insulin use for > 11 years associated with diabetic neuropathy,

## 2018-08-31 NOTE — TELEPHONE ENCOUNTER
Pt called to say he was returning a call from someone who left him a message today. Please call pt back. He does not know who called.

## 2018-09-04 NOTE — TELEPHONE ENCOUNTER
Called pt back and l/m regarding paper scripts for dexcom. Pt stated during appt on 8/31/18 that he wanted to hand carry the scripts. Thanks!

## 2018-09-28 DIAGNOSIS — E55.9 VITAMIN D DEFICIENCY: ICD-10-CM

## 2018-10-01 DIAGNOSIS — E78.2 MIXED HYPERLIPIDEMIA: ICD-10-CM

## 2018-10-01 RX ORDER — SIMVASTATIN 40 MG
40 TABLET ORAL NIGHTLY
Qty: 90 TABLET | Refills: 1 | Status: SHIPPED | OUTPATIENT
Start: 2018-10-01 | End: 2018-12-28 | Stop reason: SDUPTHER

## 2018-10-01 RX ORDER — DOCUSATE SODIUM 100 MG/1
CAPSULE, LIQUID FILLED ORAL
Qty: 180 CAPSULE | Refills: 1 | Status: SHIPPED | OUTPATIENT
Start: 2018-10-01 | End: 2018-12-28 | Stop reason: SDUPTHER

## 2018-10-01 RX ORDER — ERGOCALCIFEROL 1.25 MG/1
CAPSULE ORAL
Qty: 12 CAPSULE | Refills: 1 | Status: SHIPPED | OUTPATIENT
Start: 2018-10-01 | End: 2018-12-28 | Stop reason: SDUPTHER

## 2018-10-11 ENCOUNTER — TELEPHONE (OUTPATIENT)
Dept: INTERNAL MEDICINE CLINIC | Age: 63
End: 2018-10-11

## 2018-10-11 NOTE — TELEPHONE ENCOUNTER
Pt called at 9:52 and can    He said his transportation showed up late and the lift on the [address] is not working so he has to [employer]

## 2018-10-25 ENCOUNTER — OFFICE VISIT (OUTPATIENT)
Dept: PSYCHOLOGY | Age: 63
End: 2018-10-25
Payer: COMMERCIAL

## 2018-10-25 ENCOUNTER — CARE COORDINATION (OUTPATIENT)
Dept: CARE COORDINATION | Age: 63
End: 2018-10-25

## 2018-10-25 DIAGNOSIS — F43.10 POSTTRAUMATIC STRESS DISORDER: Primary | ICD-10-CM

## 2018-10-25 DIAGNOSIS — F33.1 MODERATE EPISODE OF RECURRENT MAJOR DEPRESSIVE DISORDER (HCC): ICD-10-CM

## 2018-10-25 PROCEDURE — 90791 PSYCH DIAGNOSTIC EVALUATION: CPT | Performed by: PSYCHOLOGIST

## 2018-10-25 ASSESSMENT — PATIENT HEALTH QUESTIONNAIRE - PHQ9
4. FEELING TIRED OR HAVING LITTLE ENERGY: 2
SUM OF ALL RESPONSES TO PHQ QUESTIONS 1-9: 20
6. FEELING BAD ABOUT YOURSELF - OR THAT YOU ARE A FAILURE OR HAVE LET YOURSELF OR YOUR FAMILY DOWN: 2
1. LITTLE INTEREST OR PLEASURE IN DOING THINGS: 3
7. TROUBLE CONCENTRATING ON THINGS, SUCH AS READING THE NEWSPAPER OR WATCHING TELEVISION: 2
5. POOR APPETITE OR OVEREATING: 2
SUM OF ALL RESPONSES TO PHQ QUESTIONS 1-9: 20
8. MOVING OR SPEAKING SO SLOWLY THAT OTHER PEOPLE COULD HAVE NOTICED. OR THE OPPOSITE, BEING SO FIGETY OR RESTLESS THAT YOU HAVE BEEN MOVING AROUND A LOT MORE THAN USUAL: 1
9. THOUGHTS THAT YOU WOULD BE BETTER OFF DEAD, OR OF HURTING YOURSELF: 3
SUM OF ALL RESPONSES TO PHQ9 QUESTIONS 1 & 2: 5
2. FEELING DOWN, DEPRESSED OR HOPELESS: 2
3. TROUBLE FALLING OR STAYING ASLEEP: 3
10. IF YOU CHECKED OFF ANY PROBLEMS, HOW DIFFICULT HAVE THESE PROBLEMS MADE IT FOR YOU TO DO YOUR WORK, TAKE CARE OF THINGS AT HOME, OR GET ALONG WITH OTHER PEOPLE: 3

## 2018-10-25 NOTE — PROGRESS NOTES
anhedonia, poor self-worth, social isolation, high appetite, insomnia/hypersomnia, fatigue, psychomotor retardation, and poor concentration/focus. Reported he wonders about death, but adamantly denied intent or plan d/t [employer] beliefs about how wrong it would be to take his life. Working on making changes to diet slowly to reduce carbs, but fasting BS continue to be 170+. Stated much of his stress is d/t \"The world is run by [place]. On every level of government. \" Distressed by what he perceives as wife's poor care at her ECF and financial difficulties that are all-consuming to him. 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 Collaborated alfred Cesar to possibly arrange for social work support. Substance use:    - 1-2 beers/evening, reported 3 or make make him feel impaired, but drinks this much 2-3x/mo \"If it's been a really bad day. \"   -Caffeine: 4 c of coffee/day. Provided psychoeducation on relationship between caffeine and anxiety. Pt agreed to consider reduction.   - Nicotine: Down to 3mg/ml nicotine juice, plans to quit completely. Of note, when I left office to discuss case alfred Mackenziederrick, pt clearly used his e-cig and tried to hide it upon my return.       O:  MSE:    Appearance    alert  Impulsive behavior Yes  Speech    spontaneous, normal rate, normal volume, well articulated and high productivity  Mood    Anxious  Angry  Depressed  Affect    agitation  Thought Content    intact, cognitive distortions and all or nothing thinking  Thought Process    goal directed, coherent and tangential  Associations    logical connections, tangential connections  Insight    Fair  Judgment    Fair  Orientation    oriented to person, place, time, and general circumstances  Memory    recent and remote memory intact  Attention/Concentration    impaired  Morbid ideation Yes, Patient

## 2018-10-29 NOTE — CARE COORDINATION
This ACC spoke with Oly Rogers at [facility]. Zaynab Jenkins CM is aware of living situation and has observed that it is mostly cosmetic in which [employer] will not financially assist.  Akanksha Hurley has provided resources to the pt to follow up on and she has a F2F scheduled on 11/5/2018 to discuss this further with the pt.

## 2018-11-26 ENCOUNTER — OFFICE VISIT (OUTPATIENT)
Dept: INTERNAL MEDICINE CLINIC | Age: 63
End: 2018-11-26
Payer: COMMERCIAL

## 2018-11-26 VITALS
WEIGHT: 189 LBS | BODY MASS INDEX: 24.27 KG/M2 | DIASTOLIC BLOOD PRESSURE: 76 MMHG | HEART RATE: 84 BPM | SYSTOLIC BLOOD PRESSURE: 130 MMHG

## 2018-11-26 DIAGNOSIS — F31.9 BIPOLAR AFFECTIVE DISORDER, REMISSION STATUS UNSPECIFIED (HCC): ICD-10-CM

## 2018-11-26 DIAGNOSIS — I10 ESSENTIAL HYPERTENSION: Primary | ICD-10-CM

## 2018-11-26 DIAGNOSIS — F33.1 MODERATE EPISODE OF RECURRENT MAJOR DEPRESSIVE DISORDER (HCC): ICD-10-CM

## 2018-11-26 DIAGNOSIS — E11.65 UNCONTROLLED TYPE 2 DIABETES MELLITUS WITH HYPERGLYCEMIA (HCC): ICD-10-CM

## 2018-11-26 DIAGNOSIS — Z23 NEED FOR INFLUENZA VACCINATION: ICD-10-CM

## 2018-11-26 PROCEDURE — 90682 RIV4 VACC RECOMBINANT DNA IM: CPT | Performed by: NURSE PRACTITIONER

## 2018-11-26 PROCEDURE — 99214 OFFICE O/P EST MOD 30 MIN: CPT | Performed by: NURSE PRACTITIONER

## 2018-11-26 PROCEDURE — G0008 ADMIN INFLUENZA VIRUS VAC: HCPCS | Performed by: NURSE PRACTITIONER

## 2018-11-26 RX ORDER — POTASSIUM CHLORIDE 750 MG/1
10 TABLET, EXTENDED RELEASE ORAL DAILY
Qty: 90 TABLET | Refills: 1 | Status: SHIPPED | OUTPATIENT
Start: 2018-11-26 | End: 2019-02-18 | Stop reason: SDUPTHER

## 2018-11-26 RX ORDER — LISINOPRIL 10 MG/1
10 TABLET ORAL DAILY
Qty: 90 TABLET | Refills: 1 | Status: SHIPPED | OUTPATIENT
Start: 2018-11-26 | End: 2019-02-18 | Stop reason: SDUPTHER

## 2018-11-26 NOTE — PROGRESS NOTES
Take 1 tablet by mouth nightly 90 tablet 1    insulin aspart protamine-insulin aspart (NOVOLOG MIX 70/30 FLEXPEN) (70-30) 100 UNIT/ML injection Inject 40 Units into the skin 2 times daily (with meals) 7 pen 3    Blood Glucose Monitoring Suppl (FREESTYLE LITE) REEMA 1 Device by Does not apply route 2 times daily (before meals) 1 Device 0    blood glucose test strips (FREESTYLE LITE) strip 4 times daily. 100 strip 6    primidone (MYSOLINE) 50 MG tablet Take 1 tablet by mouth nightly 90 tablet 1    tamsulosin (FLOMAX) 0.4 MG capsule Take 1 capsule by mouth daily 90 capsule 1    QUEtiapine (SEROQUEL) 300 MG tablet Take 1 tablet by mouth nightly 30 tablet 5    insulin detemir (LEVEMIR FLEXTOUCH) 100 UNIT/ML injection pen Inject 70 Units into the skin nightly 15 pen 5    metFORMIN (GLUCOPHAGE XR) 500 MG extended release tablet Take 4 tablets by mouth daily (with breakfast) 120 tablet 11    insulin aspart (NOVOLOG FLEXPEN) 100 UNIT/ML injection pen INJECT UP  UNITS BYSUBCUTANEOUS ROUTE THREE TIMES A DAY AS DIRECTED 5 pen 10    Melatonin 10 MG CAPS Take 60 mg by mouth nightly       diphenhydrAMINE (BENADRYL) 25 MG tablet Take 50 mg by mouth every 6 hours as needed for Sleep       aspirin (ASPIRIN LOW DOSE) 81 MG EC tablet Take 1 tablet by mouth daily 90 tablet 3    bisacodyl (BISACODYL) 5 MG EC tablet Take 1 tablet by mouth daily as needed for Constipation 30 tablet 11    buPROPion (WELLBUTRIN SR) 100 MG extended release tablet Take 1 tablet by mouth 2 times daily (Patient taking differently: Take 100 mg by mouth daily ) 180 tablet 3    glucose blood VI test strips (TRUE METRIX BLOOD GLUCOSE TEST) strip 3 each by Does not apply route 3 times daily As needed. 100 strip 11    losartan (COZAAR) 25 MG tablet Take 1 tablet by mouth daily 30 tablet 5     No current facility-administered medications for this visit. Review of Systems   Constitutional: Negative for chills and fever.    Respiratory: Negative. Cardiovascular: Negative. Gastrointestinal: Negative. Genitourinary: Negative. Psychiatric/Behavioral: Positive for dysphoric mood. The patient is nervous/anxious. All other systems reviewed and are negative. OBJECTIVE:  Physical Exam   Constitutional: He is oriented to person, place, and time. He appears well-developed and well-nourished. He is cooperative. Unkept appearing gentleman in wheelchair who is alert, oriented   HENT:   Head: Normocephalic and atraumatic. Eyes:   Right eye closed   Neck: Neck supple. No JVD present. Cardiovascular: Normal rate and regular rhythm. Pulmonary/Chest: Effort normal and breath sounds normal. No respiratory distress. He has no wheezes. He has no rales. Abdominal: He exhibits no distension. There is no guarding. Musculoskeletal: He exhibits no edema or tenderness. Neurological: He is alert and oriented to person, place, and time. Skin: Skin is warm and dry. Psychiatric: His speech is normal. His affect is angry and blunt. /76   Pulse 84   Wt 189 lb (85.7 kg)   BMI 24.27 kg/m²      PHQ Scores 10/25/2018 8/31/2018 5/25/2018 2/23/2018 12/22/2017 11/1/2017 9/18/2017   PHQ2 Score 5 4 3 4 5 3 6   PHQ9 Score 20 13 18 13 12 10 17     Interpretation of Total Score Depression Severity: 1-4 = Minimal depression, 5-9 = Mild depression, 10-14 = Moderate depression, 15-19 = Moderately severe depression, 20-27 =Severe depression        ASSESSMENT/PLAN:  [place] was seen today for 3 month follow-up. Diagnoses and all orders for this visit:    Essential hypertension  - Refusing to take losartan due to something he read  - Will take lisinopril but had issues with hypokalemia with this  -     lisinopril (PRINIVIL;ZESTRIL) 10 MG tablet; Take 1 tablet by mouth daily  -     potassium chloride (KLOR-CON M) 10 MEQ extended release tablet; Take 1 tablet by mouth daily  -     BASIC METABOLIC PANEL;  Future    Uncontrolled type 2 diabetes mellitus with hyperglycemia ([facility] [address]  - Poorly controlled. 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

## 2018-11-27 ASSESSMENT — ENCOUNTER SYMPTOMS
GASTROINTESTINAL NEGATIVE: 1
RESPIRATORY NEGATIVE: 1

## 2018-12-03 ENCOUNTER — OFFICE VISIT (OUTPATIENT)
Dept: PSYCHOLOGY | Age: 63
End: 2018-12-03
Payer: COMMERCIAL

## 2018-12-03 DIAGNOSIS — F43.10 POSTTRAUMATIC STRESS DISORDER: ICD-10-CM

## 2018-12-03 DIAGNOSIS — F41.1 GAD (GENERALIZED ANXIETY DISORDER): ICD-10-CM

## 2018-12-03 DIAGNOSIS — F33.1 MODERATE EPISODE OF RECURRENT MAJOR DEPRESSIVE DISORDER (HCC): Primary | ICD-10-CM

## 2018-12-03 PROCEDURE — 90832 PSYTX W PT 30 MINUTES: CPT | Performed by: PSYCHOLOGIST

## 2018-12-28 DIAGNOSIS — E55.9 VITAMIN D DEFICIENCY: ICD-10-CM

## 2018-12-28 DIAGNOSIS — E78.2 MIXED HYPERLIPIDEMIA: ICD-10-CM

## 2018-12-28 RX ORDER — SIMVASTATIN 40 MG
40 TABLET ORAL NIGHTLY
Qty: 90 TABLET | Refills: 1 | Status: SHIPPED | OUTPATIENT
Start: 2018-12-28 | End: 2019-10-12 | Stop reason: SDUPTHER

## 2018-12-28 RX ORDER — DOCUSATE SODIUM 100 MG/1
CAPSULE, LIQUID FILLED ORAL
Qty: 180 CAPSULE | Refills: 1 | Status: SHIPPED | OUTPATIENT
Start: 2018-12-28 | End: 2019-02-18 | Stop reason: SDUPTHER

## 2018-12-28 RX ORDER — LOSARTAN POTASSIUM 25 MG/1
TABLET ORAL
Qty: 30 TABLET | Refills: 5 | Status: SHIPPED | OUTPATIENT
Start: 2018-12-28 | End: 2019-04-04 | Stop reason: ALTCHOICE

## 2018-12-28 RX ORDER — ERGOCALCIFEROL 1.25 MG/1
CAPSULE ORAL
Qty: 12 CAPSULE | Refills: 1 | Status: SHIPPED | OUTPATIENT
Start: 2018-12-28 | End: 2020-01-03

## 2019-01-03 ENCOUNTER — OFFICE VISIT (OUTPATIENT)
Dept: ENDOCRINOLOGY | Age: 64
End: 2019-01-03
Payer: COMMERCIAL

## 2019-01-03 ENCOUNTER — TELEPHONE (OUTPATIENT)
Dept: ENDOCRINOLOGY | Age: 64
End: 2019-01-03

## 2019-01-03 VITALS
OXYGEN SATURATION: 94 % | SYSTOLIC BLOOD PRESSURE: 109 MMHG | BODY MASS INDEX: 24.33 KG/M2 | DIASTOLIC BLOOD PRESSURE: 57 MMHG | HEART RATE: 100 BPM | WEIGHT: 189.6 LBS | HEIGHT: 74 IN

## 2019-01-03 DIAGNOSIS — E11.65 UNCONTROLLED TYPE 2 DIABETES MELLITUS WITH HYPERGLYCEMIA (HCC): Primary | ICD-10-CM

## 2019-01-03 LAB — HBA1C MFR BLD: 10.5 %

## 2019-01-03 PROCEDURE — 99214 OFFICE O/P EST MOD 30 MIN: CPT | Performed by: NURSE PRACTITIONER

## 2019-01-03 PROCEDURE — 83036 HEMOGLOBIN GLYCOSYLATED A1C: CPT | Performed by: NURSE PRACTITIONER

## 2019-01-03 RX ORDER — FLASH GLUCOSE SENSOR
2 KIT MISCELLANEOUS PRN
Qty: 2 EACH | Refills: 3 | Status: SHIPPED | OUTPATIENT
Start: 2019-01-03 | End: 2020-04-10

## 2019-01-03 RX ORDER — FLASH GLUCOSE SCANNING READER
1 EACH MISCELLANEOUS PRN
Qty: 1 DEVICE | Refills: 0 | Status: SHIPPED | OUTPATIENT
Start: 2019-01-03

## 2019-01-03 RX ORDER — FLASH GLUCOSE SCANNING READER
1 EACH MISCELLANEOUS PRN
Qty: 1 DEVICE | Refills: 0 | Status: SHIPPED | OUTPATIENT
Start: 2019-01-03 | End: 2019-01-03 | Stop reason: DRUGHIGH

## 2019-01-03 RX ORDER — FLASH GLUCOSE SENSOR
2 KIT MISCELLANEOUS PRN
Qty: 2 EACH | Refills: 3 | Status: SHIPPED | OUTPATIENT
Start: 2019-01-03 | End: 2019-06-05 | Stop reason: SDUPTHER

## 2019-01-03 ASSESSMENT — ENCOUNTER SYMPTOMS
SHORTNESS OF BREATH: 0
COLOR CHANGE: 0
CONSTIPATION: 0
DIARRHEA: 0
EYE PAIN: 0
NAUSEA: 0

## 2019-01-04 DIAGNOSIS — I10 ESSENTIAL HYPERTENSION: ICD-10-CM

## 2019-01-04 DIAGNOSIS — E11.65 UNCONTROLLED TYPE 2 DIABETES MELLITUS WITH HYPERGLYCEMIA (HCC): ICD-10-CM

## 2019-01-04 LAB
ANION GAP SERPL CALCULATED.3IONS-SCNC: 15 MMOL/L (ref 3–16)
BUN BLDV-MCNC: 14 MG/DL (ref 7–20)
CALCIUM SERPL-MCNC: 9.7 MG/DL (ref 8.3–10.6)
CHLORIDE BLD-SCNC: 96 MMOL/L (ref 99–110)
CO2: 28 MMOL/L (ref 21–32)
CREAT SERPL-MCNC: 0.8 MG/DL (ref 0.8–1.3)
GFR AFRICAN AMERICAN: >60
GFR NON-AFRICAN AMERICAN: >60
GLUCOSE BLD-MCNC: 179 MG/DL (ref 70–99)
POTASSIUM SERPL-SCNC: 5 MMOL/L (ref 3.5–5.1)
SODIUM BLD-SCNC: 139 MMOL/L (ref 136–145)
TSH REFLEX FT4: 0.92 UIU/ML (ref 0.27–4.2)

## 2019-01-30 DIAGNOSIS — F33.9 RECURRENT MAJOR DEPRESSIVE EPISODES (HCC): ICD-10-CM

## 2019-01-30 DIAGNOSIS — R35.0 BENIGN PROSTATIC HYPERPLASIA WITH URINARY FREQUENCY: ICD-10-CM

## 2019-01-30 DIAGNOSIS — F51.05 INSOMNIA DUE TO MENTAL DISORDER: ICD-10-CM

## 2019-01-30 DIAGNOSIS — N40.1 BENIGN PROSTATIC HYPERPLASIA WITH URINARY FREQUENCY: ICD-10-CM

## 2019-01-30 DIAGNOSIS — F31.9 BIPOLAR AFFECTIVE DISORDER, REMISSION STATUS UNSPECIFIED (HCC): ICD-10-CM

## 2019-01-30 RX ORDER — QUETIAPINE FUMARATE 300 MG/1
TABLET, FILM COATED ORAL
Qty: 30 TABLET | Refills: 5 | Status: SHIPPED | OUTPATIENT
Start: 2019-01-30 | End: 2019-06-28 | Stop reason: ALTCHOICE

## 2019-01-30 RX ORDER — TAMSULOSIN HYDROCHLORIDE 0.4 MG/1
CAPSULE ORAL
Qty: 90 CAPSULE | Refills: 1 | Status: SHIPPED | OUTPATIENT
Start: 2019-01-30 | End: 2019-10-12 | Stop reason: SDUPTHER

## 2019-02-18 ENCOUNTER — TELEPHONE (OUTPATIENT)
Dept: INTERNAL MEDICINE CLINIC | Age: 64
End: 2019-02-18

## 2019-02-18 DIAGNOSIS — F43.10 POSTTRAUMATIC STRESS DISORDER: ICD-10-CM

## 2019-02-18 DIAGNOSIS — I10 ESSENTIAL HYPERTENSION: ICD-10-CM

## 2019-02-18 DIAGNOSIS — F33.9 RECURRENT MAJOR DEPRESSIVE EPISODES (HCC): ICD-10-CM

## 2019-02-18 DIAGNOSIS — E11.65 UNCONTROLLED TYPE 2 DIABETES MELLITUS WITH HYPERGLYCEMIA (HCC): ICD-10-CM

## 2019-02-18 DIAGNOSIS — F31.9 BIPOLAR AFFECTIVE DISORDER, REMISSION STATUS UNSPECIFIED (HCC): ICD-10-CM

## 2019-02-18 RX ORDER — ASPIRIN 81 MG/1
81 TABLET ORAL DAILY
Qty: 90 TABLET | Refills: 3 | Status: SHIPPED | OUTPATIENT
Start: 2019-02-18 | End: 2020-02-10

## 2019-02-18 RX ORDER — DOCUSATE SODIUM 100 MG/1
CAPSULE, LIQUID FILLED ORAL
Qty: 180 CAPSULE | Refills: 3 | Status: SHIPPED | OUTPATIENT
Start: 2019-02-18 | End: 2020-03-10 | Stop reason: SDUPTHER

## 2019-02-18 RX ORDER — POTASSIUM CHLORIDE 750 MG/1
10 TABLET, EXTENDED RELEASE ORAL DAILY
Qty: 90 TABLET | Refills: 3 | Status: SHIPPED | OUTPATIENT
Start: 2019-02-18 | End: 2021-01-14 | Stop reason: SDUPTHER

## 2019-02-18 RX ORDER — PRIMIDONE 50 MG/1
TABLET ORAL
Qty: 90 TABLET | Refills: 3 | Status: SHIPPED | OUTPATIENT
Start: 2019-02-18 | End: 2020-02-11

## 2019-02-18 RX ORDER — BUPROPION HYDROCHLORIDE 100 MG/1
100 TABLET, EXTENDED RELEASE ORAL 2 TIMES DAILY
Qty: 180 TABLET | Refills: 3 | Status: SHIPPED | OUTPATIENT
Start: 2019-02-18 | End: 2019-11-11 | Stop reason: SDUPTHER

## 2019-02-18 RX ORDER — LISINOPRIL 10 MG/1
10 TABLET ORAL DAILY
Qty: 90 TABLET | Refills: 3 | Status: SHIPPED | OUTPATIENT
Start: 2019-02-18 | End: 2020-02-10

## 2019-02-25 ENCOUNTER — OFFICE VISIT (OUTPATIENT)
Dept: INTERNAL MEDICINE CLINIC | Age: 64
End: 2019-02-25
Payer: COMMERCIAL

## 2019-02-25 VITALS
HEIGHT: 74 IN | WEIGHT: 190 LBS | BODY MASS INDEX: 24.38 KG/M2 | DIASTOLIC BLOOD PRESSURE: 74 MMHG | SYSTOLIC BLOOD PRESSURE: 128 MMHG | HEART RATE: 88 BPM

## 2019-02-25 DIAGNOSIS — I10 ESSENTIAL HYPERTENSION: ICD-10-CM

## 2019-02-25 DIAGNOSIS — E11.65 UNCONTROLLED TYPE 2 DIABETES MELLITUS WITH HYPERGLYCEMIA (HCC): ICD-10-CM

## 2019-02-25 DIAGNOSIS — F31.9 BIPOLAR AFFECTIVE DISORDER, REMISSION STATUS UNSPECIFIED (HCC): ICD-10-CM

## 2019-02-25 DIAGNOSIS — J30.81 ALLERGIC RHINITIS DUE TO ANIMAL HAIR AND DANDER: ICD-10-CM

## 2019-02-25 DIAGNOSIS — F43.10 POSTTRAUMATIC STRESS DISORDER: ICD-10-CM

## 2019-02-25 DIAGNOSIS — M79.641 PAIN IN BOTH HANDS: ICD-10-CM

## 2019-02-25 DIAGNOSIS — J43.2 CENTRILOBULAR EMPHYSEMA (HCC): ICD-10-CM

## 2019-02-25 DIAGNOSIS — F33.1 MODERATE EPISODE OF RECURRENT MAJOR DEPRESSIVE DISORDER (HCC): ICD-10-CM

## 2019-02-25 DIAGNOSIS — M79.642 PAIN IN BOTH HANDS: ICD-10-CM

## 2019-02-25 DIAGNOSIS — F51.04 PSYCHOPHYSIOLOGICAL INSOMNIA: ICD-10-CM

## 2019-02-25 PROCEDURE — 99214 OFFICE O/P EST MOD 30 MIN: CPT | Performed by: NURSE PRACTITIONER

## 2019-02-25 RX ORDER — MONTELUKAST SODIUM 10 MG/1
10 TABLET ORAL NIGHTLY
Qty: 30 TABLET | Refills: 5 | Status: SHIPPED | OUTPATIENT
Start: 2019-02-25 | End: 2019-08-17 | Stop reason: SDUPTHER

## 2019-02-27 ASSESSMENT — ENCOUNTER SYMPTOMS
GASTROINTESTINAL NEGATIVE: 1
RESPIRATORY NEGATIVE: 1

## 2019-04-04 ENCOUNTER — OFFICE VISIT (OUTPATIENT)
Dept: ENDOCRINOLOGY | Age: 64
End: 2019-04-04
Payer: COMMERCIAL

## 2019-04-04 VITALS
HEART RATE: 86 BPM | DIASTOLIC BLOOD PRESSURE: 64 MMHG | BODY MASS INDEX: 25.03 KG/M2 | OXYGEN SATURATION: 93 % | HEIGHT: 74 IN | WEIGHT: 195 LBS | SYSTOLIC BLOOD PRESSURE: 120 MMHG

## 2019-04-04 DIAGNOSIS — E11.65 UNCONTROLLED TYPE 2 DIABETES MELLITUS WITH HYPERGLYCEMIA (HCC): Primary | ICD-10-CM

## 2019-04-04 LAB — HBA1C MFR BLD: 10.4 %

## 2019-04-04 PROCEDURE — 99214 OFFICE O/P EST MOD 30 MIN: CPT | Performed by: NURSE PRACTITIONER

## 2019-04-04 PROCEDURE — 83036 HEMOGLOBIN GLYCOSYLATED A1C: CPT | Performed by: NURSE PRACTITIONER

## 2019-04-04 ASSESSMENT — ENCOUNTER SYMPTOMS
CONSTIPATION: 0
DIARRHEA: 0
EYE PAIN: 0
NAUSEA: 0
COLOR CHANGE: 0
SHORTNESS OF BREATH: 0

## 2019-04-04 NOTE — PROGRESS NOTES
Endocrinology  [facility] Real, [address] [facility]  Phone: [phone]   FAX: [phone]    Yamilet Mccallum is a 60year old male with a history of Diabetes Mellitus type 2 for 25 years    Last A1C:   Lab Results   Component Value Date    LABA1C 10.5 01/03/2019    LABA1C 12.0 08/28/2018    LABA1C 9.0 05/25/2018     Last BP Readings:   BP Readings from Last 3 Encounters:   02/25/19 128/74   01/03/19 (!) 109/57   11/26/18 130/76     Last LDL:   Lab Results   Component Value Date    LDLCALC 49 05/01/2018     Aspirin Use: 81 mg    Tobacco History:    Smoking status: Current Every Day Smoker     Packs/day: 2.00     Years: 41.00     Types: E-Cigarettes     Start date: 7/1/2014     Last attempt to quit: 1/1/2018    Smokeless tobacco: Never Used      Comment: using E-cig with nicotine. Last cigarette 1/1/18.  Alcohol use Yes     HPI     Sidney Vásquez is a 58 y.o. male  with a history of Diabetes Mellitus type 2 since he was in his early forties. Disease course has been variable and he is interested in improving A1c further. Has tried meformin , glybuide and glipizide with unsatisfactory reduction in A1c. Has brought his sugar / insulin log. He is at high risk for adverse events related to hypoglycemia   - frequent hypoglycemia in readings  - frequent erratic readings due to  issues as he is legally blind (enucleated eye)  - lives alone with no support and it is a safety concern given his frequent hypoglycemia  - unable to access food/drink quickly as he is wheelchair bound and cannot get to help quickly      Interim HPI 04/4/2019  · A1c is stable but remains high.    · Was able to get the 14 day sensor and reader finally  ·        Interim HPI 01/03/2019   · Canceled last 2 visits; reports dealing with the broken healthcare system ( r/t to both his wife and himself)  · Saw behavioral health for Bipolar/PTSD/anxiety/depression/insomnia:   · Follows routinely with PCP          Has improved his diet somewhat in interim and improved A1c  Reports unable to count carbs etc due to visual deficits, but is portion sizing better  Was unable to qualify for Dexcom sensor  Interested in applying for [facility]: will send     Current regimen:   - Metformin 4 x 400 mg QD  - Lantus: 70 units QAM--> 50 QAM  - Novalog: uses sliding scale per SSI and carb ratio 1:10; -->   - Injects 4 times a day    - Usually tests upto four or more times a day  BG readings per glucometer: checks 4 times daily  Average : 258--> 246  Highest:  368 --> 467  Lowest : 45 ( reason not known)--> 72            Interim HPI 8/31/18  Presents today without meter or log. Reports he has not been compliant a few weeks in recent past. Overwhelmed with the burden of disease management. Has been unsuccessful with appealing to his insurance for a Dexcom Sensor ( due to T2DM ) and is upset about that. Wife in nursing home and receiving subpar care which is also a major stressor. Continues to smoke. Reports decreased muscle strength but can transfer to bed to wheelchair independently. Interim HPI 5/25/18  Pesents today without meter or log. Has not checked recently as fingers are very sore and meter readings are highly variable. Worried about frequent lows as is dosing insulin without checking    Interim HPI 2/23/2018  ED visit on 1/6/18 for insomnia. Out of his medication and had not slept in 4 days.  He has tried to get in touch with his provider Lisette Corral cnp in [place]    Patient brought a detailed log of BG and insulin dosing  States his current meter is not reliable with minute to minute variation in readings on rechecks  Log shows frequent lows in the 50s,   He is testing 4-6 times a day on the days that he feels well enough to check  Lowest in past 2 weeks: 51(fasting)  Highest in past 2 weeks: 431 (fasting); often has beer at night  Hypoglycemia awareness and symptoms:has chills, visual disturbances that resolve with correction of sugar levels  Lives alone, limited mobility, wheel chair bound    Current Medication regimen:   Metformin 500 mg x 2 BID  Levemir 70 U in the AM  Novolog: I:C ratio is 1: 10 grams of carbs    Injecting at mealtimes x 3 per day; injects  atleast 4 times per day  Concerned about variability in his meter readings and if he is able to dose accurately  Covers for larger snacks when possible, but has trouble checking due to reasons above    Complications:  · Retinopathy : right eye enucleated s/p glaucoma and severe corneal infection, left eye s/p cataract per patient  · Neuropathy:mild tingling in the feet. Managed per podiatrist.  · Nephropathy:  Component    Latest Ref Rng & Units 10/26/2017   Microalbumin, Random Urine    <2.0 mg/dL <1.20   Creatinine, Ur    39.0 - 259.0 mg/dL 18.9 (L)   Microalbumin Creatinine Ratio    0.0 - 30.0 mg/g see below     Diabetic Health Maintenance   · Last Eye Exam: follows Dr Kevin Almaraz annually, last > 1 year ago  · Last Foot exam: Sees podiatrist every 9 weeks  · Has patient seen a dietitian? Not recently  · Current Exercise: limited mobility in wheelchair, can transfer from chair to bed independently    Risk Factors  Smoker: started at 15 yrs old, quit 3 years ago but recently started back. Trying to quit again with help of e-cigarette. ETOH: 1-2 beer every night, uses regular. Babara Duane not want to switch to a low autumn version ( \" few pleasures in life\")   Understands how to compensate for alcohol related hypoglycemia     Vitamin D deficiency: Currently is on 50K unit weekly. Current complaints include fatigue on daily basis. Last vitamin D level is:  Lab Results   Component Value Date    VITD25 38.4 10/26/2017    VITD25 18.1 03/03/2017     Hyperlipidemia: Currently is on Zocor 40 mg.  Controlled  Lab Results   Component Value Date    CHOL 121 05/01/2018    CHOL 121 03/03/2017     Lab Results   Component Value Date    TRIG 110 05/01/2018    TRIG 73 03/03/2017     Lab Results   Component Value Date    HDL 50 05/01/2018    HDL 48 03/03/2017     Lab Results   Component Value Date    LDLCALC 49 05/01/2018    LDLCALC 58 03/03/2017     No results found for: LDLDIRECT  No results found for: CHOLHDLRATIO. Past Medical History:   Diagnosis Date    Allergic rhinitis     arthri     Arthritis     lumbar spine    CAD (coronary artery disease)     Chronic kidney disease     retention, bph    Constipation     DM (diabetes mellitus) (HCC)     Generalized pain     GERD (gastroesophageal reflux disease)     constipation    Heart attack ([address]     Heart disease     Hyperlipidemia     Movement disorder     lumbar spine arthritis    Psychiatric problem     ptsd, anxiety, depression    Urinary retention      Family History   Problem Relation Age of Onset    Miscarriages / Stillbirths Mother     Cancer Mother     Heart Disease Father     Diabetes Brother     Diabetes Maternal Uncle     Diabetes Paternal Aunt      Review of Systems   Constitutional: Negative for activity change, appetite change, diaphoresis, fever and unexpected weight change. HENT: Negative for dental problem. Eyes: Positive for visual disturbance. Negative for pain. Respiratory: Negative for shortness of breath. Cardiovascular: Negative for palpitations and leg swelling. Gastrointestinal: Negative for constipation, diarrhea and nausea. Endocrine: Negative for cold intolerance and heat intolerance. Genitourinary: Negative for frequency and urgency. Musculoskeletal: Positive for gait problem and myalgias. Negative for arthralgias and joint swelling. Skin: Negative for color change. Neurological: Negative for numbness. Psychiatric/Behavioral: Negative for dysphoric mood and sleep disturbance. Vitals:    04/04/19 1247   Weight: 195 lb (88.5 kg)   Height: 6' 2\" (1.88 m)     Physical Exam   Constitutional: He is oriented to person, place, and time. He appears well-developed and well-nourished.    Wheelchair bound mostly, could

## 2019-05-02 ENCOUNTER — OFFICE VISIT (OUTPATIENT)
Dept: ENDOCRINOLOGY | Age: 64
End: 2019-05-02
Payer: COMMERCIAL

## 2019-05-02 VITALS — WEIGHT: 201.2 LBS | SYSTOLIC BLOOD PRESSURE: 116 MMHG | BODY MASS INDEX: 25.83 KG/M2 | DIASTOLIC BLOOD PRESSURE: 62 MMHG

## 2019-05-02 DIAGNOSIS — E11.65 UNCONTROLLED TYPE 2 DIABETES MELLITUS WITH HYPERGLYCEMIA (HCC): ICD-10-CM

## 2019-05-02 PROCEDURE — 99213 OFFICE O/P EST LOW 20 MIN: CPT | Performed by: NURSE PRACTITIONER

## 2019-05-02 PROCEDURE — 95251 CONT GLUC MNTR ANALYSIS I&R: CPT | Performed by: NURSE PRACTITIONER

## 2019-05-02 ASSESSMENT — ENCOUNTER SYMPTOMS
SHORTNESS OF BREATH: 0
NAUSEA: 0
COLOR CHANGE: 0
CONSTIPATION: 0
DIARRHEA: 0
EYE PAIN: 0

## 2019-05-02 NOTE — PROGRESS NOTES
Endocrinology  [place], [place]  Phone: [phone]   FAX: [phone]    Kadeem Medrano is a 60year old male with a history of Diabetes Mellitus type 2 for 25 years    Last A1C:   Lab Results   Component Value Date    LABA1C 10.4 04/04/2019    LABA1C 10.5 01/03/2019    LABA1C 12.0 08/28/2018     Last BP Readings:   BP Readings from Last 3 Encounters:   05/03/19 122/70   05/02/19 116/62   04/04/19 120/64     Last LDL:   Lab Results   Component Value Date    LDLCALC 49 05/01/2018     Aspirin Use: 81 mg    Tobacco History:    Smoking status: Current Every Day Smoker     Packs/day: 2.00     Years: 41.00     Types: E-Cigarettes     Start date: 7/1/2014     Last attempt to quit: 1/1/2018    Smokeless tobacco: Never Used      Comment: using E-cig with nicotine. Last cigarette 1/1/18.  Alcohol use Yes     HPI     Joel Astudillo is a 58 y.o. male  with a history of Diabetes Mellitus type 2 since he was in his early forties. Disease course has been variable and he is interested in improving A1c further. Has tried meformin , glybuide and glipizide with unsatisfactory reduction in A1c. Has brought his sugar / insulin log. He is at high risk for adverse events related to hypoglycemia   - frequent hypoglycemia in readings  - frequent erratic readings due to  issues as he is legally blind (enucleated eye)  - lives alone with no support and it is a safety concern given his frequent hypoglycemia  - unable to access food/drink quickly as he is wheelchair bound and cannot get to help quickly     Interim HPI 05/2/2019  Sensor downloaded and reviewed with patient  Reports reasonable satisfaction and improvement of diabetic monitoring  BG have been better: average 125  Range 16%> 180; 64%within ; 20% < 70 with 13% < 54 mg/dl  Error percentage approximately 15 mg/dl  Hypoglycemia usually at night   Has about 2 alcoholic drinks at night with no snacks     Interim HPI 04/4/2019  · A1c is stable but remains high.    · Was able unit weekly. Current complaints include fatigue on daily basis. Last vitamin D level is:  Lab Results   Component Value Date    VITD25 38.4 10/26/2017    VITD25 18.1 03/03/2017     Hyperlipidemia: Currently is on Zocor 40 mg. Controlled  Lab Results   Component Value Date    CHOL 121 05/01/2018    CHOL 121 03/03/2017     Lab Results   Component Value Date    TRIG 110 05/01/2018    TRIG 73 03/03/2017     Lab Results   Component Value Date    HDL 50 05/01/2018    HDL 48 03/03/2017     Lab Results   Component Value Date    LDLCALC 49 05/01/2018    [facility] 58 03/03/2017     No results found for: LDLDIRECT  No results found for: CHOLHDLRATIO. Past Medical History:   Diagnosis Date    Allergic rhinitis     arthri     Arthritis     lumbar spine    CAD (coronary artery disease)     Chronic kidney disease     retention, bph    Constipation     DM (diabetes mellitus) (HCC)     Generalized pain     GERD (gastroesophageal reflux disease)     constipation    Heart attack ([address]     Heart disease     Hyperlipidemia     Movement disorder     lumbar spine arthritis    Psychiatric problem     ptsd, anxiety, depression    Urinary retention      Family History   Problem Relation Age of Onset    Miscarriages / Stillbirths Mother     Cancer Mother     Heart Disease Father     Diabetes Brother     Diabetes Maternal Uncle     Diabetes Paternal Aunt      Review of Systems   Constitutional: Negative for activity change, appetite change, diaphoresis, fever and unexpected weight change. HENT: Negative for dental problem. Eyes: Positive for visual disturbance. Negative for pain. Respiratory: Negative for shortness of breath. Cardiovascular: Negative for palpitations and leg swelling. Gastrointestinal: Negative for constipation, diarrhea and nausea. Endocrine: Negative for cold intolerance and heat intolerance. Genitourinary: Negative for frequency and urgency.    Musculoskeletal: Positive for gait problem BG  Mismatch between long and short acting insulins in current regimen    Check cpeptide: may trial GLP1 to improve glycemic control  Reviewed dietary changes  Reviewed carb calculator for alcohol consumption  Emphasized need for consumption of  consume protein/fat snacks with drinks. Complete labs         Relevant Medications    insulin glargine (TOUJEO MAX SOLOSTAR) 300 UNIT/ML injection pen      Other Visit Diagnoses     Uncontrolled type 2 diabetes mellitus without complication, with long-term current use of insulin (HCC)    -  Primary    Relevant Medications    insulin glargine (TOUJEO MAX SOLOSTAR) 300 UNIT/ML injection pen    Other Relevant Orders    C-Peptide (Completed)    [facility] CONTINUOUS GLUCOSE MONITORING ANALYSIS I&R (Completed)        Return in about 2 months (around 7/2/2019).

## 2019-05-03 ENCOUNTER — OFFICE VISIT (OUTPATIENT)
Dept: PSYCHIATRY | Age: 64
End: 2019-05-03
Payer: COMMERCIAL

## 2019-05-03 VITALS
HEIGHT: 74 IN | WEIGHT: 201 LBS | HEART RATE: 72 BPM | BODY MASS INDEX: 25.8 KG/M2 | SYSTOLIC BLOOD PRESSURE: 122 MMHG | DIASTOLIC BLOOD PRESSURE: 70 MMHG

## 2019-05-03 DIAGNOSIS — F31.32 BIPOLAR AFFECTIVE DISORDER, CURRENTLY DEPRESSED, MODERATE (HCC): Primary | ICD-10-CM

## 2019-05-03 DIAGNOSIS — F41.9 ANXIETY DISORDER, UNSPECIFIED TYPE: ICD-10-CM

## 2019-05-03 DIAGNOSIS — F10.10 ALCOHOL ABUSE: ICD-10-CM

## 2019-05-03 PROCEDURE — 99999 PR OFFICE/OUTPT VISIT,PROCEDURE ONLY: CPT | Performed by: PSYCHIATRY & NEUROLOGY

## 2019-05-03 PROCEDURE — 99204 OFFICE O/P NEW MOD 45 MIN: CPT | Performed by: PSYCHIATRY & NEUROLOGY

## 2019-05-03 RX ORDER — QUETIAPINE FUMARATE 100 MG/1
100 TABLET, FILM COATED ORAL EVERY EVENING
Qty: 30 TABLET | Refills: 0 | Status: SHIPPED | OUTPATIENT
Start: 2019-05-03 | End: 2019-05-23 | Stop reason: SDUPTHER

## 2019-05-03 ASSESSMENT — PATIENT HEALTH QUESTIONNAIRE - PHQ9
10. IF YOU CHECKED OFF ANY PROBLEMS, HOW DIFFICULT HAVE THESE PROBLEMS MADE IT FOR YOU TO DO YOUR WORK, TAKE CARE OF THINGS AT HOME, OR GET ALONG WITH OTHER PEOPLE: 3
4. FEELING TIRED OR HAVING LITTLE ENERGY: 3
6. FEELING BAD ABOUT YOURSELF - OR THAT YOU ARE A FAILURE OR HAVE LET YOURSELF OR YOUR FAMILY DOWN: 2
7. TROUBLE CONCENTRATING ON THINGS, SUCH AS READING THE NEWSPAPER OR WATCHING TELEVISION: 2
8. MOVING OR SPEAKING SO SLOWLY THAT OTHER PEOPLE COULD HAVE NOTICED. OR THE OPPOSITE, BEING SO FIGETY OR RESTLESS THAT YOU HAVE BEEN MOVING AROUND A LOT MORE THAN USUAL: 0
SUM OF ALL RESPONSES TO PHQ QUESTIONS 1-9: 18
SUM OF ALL RESPONSES TO PHQ9 QUESTIONS 1 & 2: 5
5. POOR APPETITE OR OVEREATING: 1
1. LITTLE INTEREST OR PLEASURE IN DOING THINGS: 2
9. THOUGHTS THAT YOU WOULD BE BETTER OFF DEAD, OR OF HURTING YOURSELF: 2
SUM OF ALL RESPONSES TO PHQ QUESTIONS 1-9: 18
2. FEELING DOWN, DEPRESSED OR HOPELESS: 3
3. TROUBLE FALLING OR STAYING ASLEEP: 3

## 2019-05-03 ASSESSMENT — ANXIETY QUESTIONNAIRES
6. BECOMING EASILY ANNOYED OR IRRITABLE: 3-NEARLY EVERY DAY
5. BEING SO RESTLESS THAT IT IS HARD TO SIT STILL: 0-NOT AT ALL SURE
7. FEELING AFRAID AS IF SOMETHING AWFUL MIGHT HAPPEN: 1-SEVERAL DAYS
4. TROUBLE RELAXING: 3-NEARLY EVERY DAY
2. NOT BEING ABLE TO STOP OR CONTROL WORRYING: 3-NEARLY EVERY DAY
1. FEELING NERVOUS, ANXIOUS, OR ON EDGE: 3-NEARLY EVERY DAY
GAD7 TOTAL SCORE: 16
3. WORRYING TOO MUCH ABOUT DIFFERENT THINGS: 3-NEARLY EVERY DAY
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
## 2019-05-03 NOTE — PATIENT INSTRUCTIONS
Try to slowly cut down on the amount of melatonin you take. Try to limit your alcohol use to 1-2 drinks per day.

## 2019-05-04 LAB — C-PEPTIDE: 1.1 NG/ML (ref 1.1–4.4)

## 2019-05-06 NOTE — ASSESSMENT & PLAN NOTE
Lab Results   Component Value Date    LABA1C 10.4 04/04/2019     Lab Results   Component Value Date    .0 03/03/2017     Decrease Toujeo to 50 units QHS   Decrease by 5 units Q3 days in order to decrease night time hypoglycemia  Target fasting AM range 100-150  Increase novolog to cover for elevated BG  Mismatch between long and short acting insulins in current regimen    Check cpeptide: may trial GLP1 to improve glycemic control  Reviewed dietary changes  Reviewed carb calculator for alcohol consumption  Emphasized need for consumption of  consume protein/fat snacks with drinks.   Complete labs

## 2019-05-09 ENCOUNTER — TELEPHONE (OUTPATIENT)
Dept: ENDOCRINOLOGY | Age: 64
End: 2019-05-09

## 2019-05-09 NOTE — TELEPHONE ENCOUNTER
Pt called to see if PA had been started. I informed him the message was sent. Started PA for [facility] and sent to plan. Waiting for determination.

## 2019-05-16 ENCOUNTER — TELEPHONE (OUTPATIENT)
Dept: ENDOCRINOLOGY | Age: 64
End: 2019-05-16

## 2019-05-16 NOTE — TELEPHONE ENCOUNTER
Patient received his toujeo but he received U300 and he said that in the past he has only been taking U100s. He would like a call back about how he should proceed with this.

## 2019-05-21 DIAGNOSIS — E11.9 TYPE 2 DIABETES MELLITUS WITHOUT COMPLICATION, WITH LONG-TERM CURRENT USE OF INSULIN (HCC): ICD-10-CM

## 2019-05-21 DIAGNOSIS — Z79.4 TYPE 2 DIABETES MELLITUS WITHOUT COMPLICATION, WITH LONG-TERM CURRENT USE OF INSULIN (HCC): ICD-10-CM

## 2019-05-21 RX ORDER — METFORMIN HYDROCHLORIDE 500 MG/1
TABLET, EXTENDED RELEASE ORAL
Qty: 120 TABLET | Refills: 5 | Status: SHIPPED | OUTPATIENT
Start: 2019-05-21 | End: 2019-12-11 | Stop reason: SDUPTHER

## 2019-05-24 ENCOUNTER — OFFICE VISIT (OUTPATIENT)
Dept: INTERNAL MEDICINE CLINIC | Age: 64
End: 2019-05-24
Payer: COMMERCIAL

## 2019-05-24 VITALS
BODY MASS INDEX: 25.55 KG/M2 | HEART RATE: 84 BPM | SYSTOLIC BLOOD PRESSURE: 152 MMHG | WEIGHT: 199 LBS | DIASTOLIC BLOOD PRESSURE: 90 MMHG

## 2019-05-24 DIAGNOSIS — F43.10 POSTTRAUMATIC STRESS DISORDER: ICD-10-CM

## 2019-05-24 DIAGNOSIS — E11.9 TYPE 2 DIABETES MELLITUS WITHOUT COMPLICATION, WITH LONG-TERM CURRENT USE OF INSULIN (HCC): ICD-10-CM

## 2019-05-24 DIAGNOSIS — G25.2 ACTION TREMOR: Primary | ICD-10-CM

## 2019-05-24 DIAGNOSIS — Z79.4 TYPE 2 DIABETES MELLITUS WITHOUT COMPLICATION, WITH LONG-TERM CURRENT USE OF INSULIN (HCC): ICD-10-CM

## 2019-05-24 DIAGNOSIS — I10 ESSENTIAL HYPERTENSION: ICD-10-CM

## 2019-05-24 DIAGNOSIS — F31.9 BIPOLAR AFFECTIVE DISORDER, REMISSION STATUS UNSPECIFIED (HCC): ICD-10-CM

## 2019-05-24 PROCEDURE — 99214 OFFICE O/P EST MOD 30 MIN: CPT | Performed by: NURSE PRACTITIONER

## 2019-05-24 RX ORDER — QUETIAPINE FUMARATE 100 MG/1
TABLET, FILM COATED ORAL
Qty: 30 TABLET | Refills: 0 | Status: SHIPPED | OUTPATIENT
Start: 2019-05-24 | End: 2019-06-12 | Stop reason: SDUPTHER

## 2019-05-24 RX ORDER — PROPRANOLOL HCL 60 MG
60 CAPSULE, EXTENDED RELEASE 24HR ORAL DAILY
Qty: 30 CAPSULE | Refills: 5 | Status: SHIPPED | OUTPATIENT
Start: 2019-05-24 | End: 2019-11-11 | Stop reason: SDUPTHER

## 2019-05-24 NOTE — PROGRESS NOTES
BY MOUTH NIGHTLY 90 tablet 1    vitamin D (ERGOCALCIFEROL) 45066 units CAPS capsule TAKE 1 CAPSULE BY MOUTH O NCE A WEEK 12 capsule 1    insulin aspart (NOVOLOG FLEXPEN) 100 UNIT/ML injection pen INJECT UP  UNITS BYSUBCUTANEOUS ROUTE THREE TIMES A DAY AS DIRECTED (Patient taking differently: INJECT UP  UNITS BYSUBCUTANEOUS ROUTE THREE TIMES A DAY AS DIRECTED - SLIDING SCALE) 5 pen 10    Melatonin 10 MG CAPS Take 80 mg by mouth nightly Pt taking 100 mg at bed time      diphenhydrAMINE (BENADRYL) 25 MG tablet Take 50 mg by mouth every 3-4 hours as needed for Allergies or Sleep        No current facility-administered medications for this visit. Review of Systems   Constitutional: Negative. Respiratory: Negative. Cardiovascular: Negative. Gastrointestinal: Negative. Genitourinary: Negative. Musculoskeletal: Positive for arthralgias. Neurological: Positive for tremors. Psychiatric/Behavioral: Positive for dysphoric mood. All other systems reviewed and are negative. OBJECTIVE:  Physical Exam   Constitutional: He is oriented to person, place, and time. He appears well-developed and well-nourished. He is cooperative. Unkept appearing gentleman in wheelchair who is alert, oriented   HENT:   Head: Normocephalic and atraumatic. Eyes:   Right eye closed   Neck: Neck supple. No JVD present. Cardiovascular: Normal rate and regular rhythm. Pulmonary/Chest: Effort normal and breath sounds normal. No respiratory distress. He has no wheezes. He has no rales. Abdominal: He exhibits no distension. There is no guarding. Musculoskeletal: He exhibits no edema or tenderness. Neurological: He is alert and oriented to person, place, and time. Skin: Skin is warm and dry. Psychiatric: His speech is normal. His affect is angry and blunt.       BP (!) 152/90   Pulse 84   Wt 199 lb (90.3 kg)   BMI 25.55 kg/m²      PHQ Scores 5/3/2019 10/25/2018 8/31/2018 5/25/2018 2/23/2018 12/22/2017 11/1/2017   PHQ2 Score 5 5 4 3 4 5 3   PHQ9 Score 18 20 13 18 13 12 10     Interpretation of Total Score Depression Severity: 1-4 = Minimal depression, 5-9 = Mild depression, 10-14 = Moderate depression, 15-19 = Moderately severe depression, 20-27 =Severe depression        ASSESSMENT/PLAN:  Tamara Rader was seen today for 3 month follow-up. Diagnoses and all orders for this visit:    Action tremor  -     propranolol (INDERAL LA) 60 MG extended release capsule; Take 1 capsule by mouth daily    Type 2 diabetes mellitus without complication, with long-term current use of insulin ([facility])  - A1c 10.4  - Plan per endocrinology    Bipolar affective disorder, remission status unspecified ([facility] [address]  Posttraumatic stress disorder  - Chronic, unchanged  - Patient of psychiatry    Essential hypertension  - Normotensive  - he has met [facility] standards.  - Continue current regimen.       Elnoria Or, APRN - CNP

## 2019-05-28 ASSESSMENT — ENCOUNTER SYMPTOMS
RESPIRATORY NEGATIVE: 1
GASTROINTESTINAL NEGATIVE: 1

## 2019-05-30 DIAGNOSIS — E11.65 UNCONTROLLED TYPE 2 DIABETES MELLITUS WITH HYPERGLYCEMIA (HCC): ICD-10-CM

## 2019-05-30 NOTE — TELEPHONE ENCOUNTER
Patient said that the toujeo has not been working for him, even after upping the dose. He asked if he could go back to [place] or Arcelia velásquez and sent to [facility].

## 2019-06-04 NOTE — TELEPHONE ENCOUNTER
Patient said he also needs sensors for his freestyle elvia. He wants these to go to [facility].

## 2019-06-04 NOTE — TELEPHONE ENCOUNTER
[facility], you marked Rx to be sent to [facility]. Please call and cancel [facility] prescription. I sent it to  to [facility] as requested in note below.

## 2019-06-05 RX ORDER — FLASH GLUCOSE SENSOR
2 KIT MISCELLANEOUS PRN
Qty: 2 EACH | Refills: 3 | Status: SHIPPED | OUTPATIENT
Start: 2019-06-05 | End: 2019-10-12 | Stop reason: SDUPTHER

## 2019-06-13 RX ORDER — QUETIAPINE FUMARATE 100 MG/1
TABLET, FILM COATED ORAL
Qty: 30 TABLET | Refills: 0 | Status: SHIPPED | OUTPATIENT
Start: 2019-06-13 | End: 2019-06-28 | Stop reason: ALTCHOICE

## 2019-06-28 ENCOUNTER — OFFICE VISIT (OUTPATIENT)
Dept: PSYCHIATRY | Age: 64
End: 2019-06-28
Payer: COMMERCIAL

## 2019-06-28 VITALS — DIASTOLIC BLOOD PRESSURE: 68 MMHG | HEART RATE: 80 BPM | SYSTOLIC BLOOD PRESSURE: 124 MMHG

## 2019-06-28 DIAGNOSIS — F10.10 ALCOHOL ABUSE: ICD-10-CM

## 2019-06-28 DIAGNOSIS — F31.32 BIPOLAR AFFECTIVE DISORDER, CURRENTLY DEPRESSED, MODERATE (HCC): Primary | ICD-10-CM

## 2019-06-28 DIAGNOSIS — F43.10 POSTTRAUMATIC STRESS DISORDER: ICD-10-CM

## 2019-06-28 PROCEDURE — 99213 OFFICE O/P EST LOW 20 MIN: CPT | Performed by: PSYCHIATRY & NEUROLOGY

## 2019-06-28 RX ORDER — QUETIAPINE FUMARATE 400 MG/1
400 TABLET, FILM COATED ORAL NIGHTLY
Qty: 30 TABLET | Refills: 5 | Status: SHIPPED | OUTPATIENT
Start: 2019-06-28 | End: 2020-01-14

## 2019-06-28 ASSESSMENT — PATIENT HEALTH QUESTIONNAIRE - PHQ9
4. FEELING TIRED OR HAVING LITTLE ENERGY: 3
3. TROUBLE FALLING OR STAYING ASLEEP: 3
1. LITTLE INTEREST OR PLEASURE IN DOING THINGS: 3
SUM OF ALL RESPONSES TO PHQ QUESTIONS 1-9: 22
SUM OF ALL RESPONSES TO PHQ QUESTIONS 1-9: 22
5. POOR APPETITE OR OVEREATING: 2
10. IF YOU CHECKED OFF ANY PROBLEMS, HOW DIFFICULT HAVE THESE PROBLEMS MADE IT FOR YOU TO DO YOUR WORK, TAKE CARE OF THINGS AT HOME, OR GET ALONG WITH OTHER PEOPLE: 3
9. THOUGHTS THAT YOU WOULD BE BETTER OFF DEAD, OR OF HURTING YOURSELF: 3
8. MOVING OR SPEAKING SO SLOWLY THAT OTHER PEOPLE COULD HAVE NOTICED. OR THE OPPOSITE, BEING SO FIGETY OR RESTLESS THAT YOU HAVE BEEN MOVING AROUND A LOT MORE THAN USUAL: 1
7. TROUBLE CONCENTRATING ON THINGS, SUCH AS READING THE NEWSPAPER OR WATCHING TELEVISION: 2
6. FEELING BAD ABOUT YOURSELF - OR THAT YOU ARE A FAILURE OR HAVE LET YOURSELF OR YOUR FAMILY DOWN: 2
2. FEELING DOWN, DEPRESSED OR HOPELESS: 3
SUM OF ALL RESPONSES TO PHQ9 QUESTIONS 1 & 2: 6

## 2019-06-28 NOTE — Clinical Note
I am dismissing Liss, mostly because he essentially dismissed me and shows no motivation for engaging in any mental health treatment. It probably would be best if he found a therapist in the community rather than get medication changes if he ever changes his opinion about mental health treatment.

## 2019-06-28 NOTE — PROGRESS NOTES
PSYCHIATRY PROGRESS NOTE    Seth Boswell  1955 06/28/19  Face to Face time: 15 min   PCP: YULIA Gonsalves - CNP    CC:   Chief Complaint   Patient presents with    Depression     S: Pt presents today quite angry, depressed and upset. His wife passed away on 6/12. Since that time he has been more depressed, lies in bed all day, no interest in doing anything. He feels he'd be better off dead. He denies any intent to harm himself, states he is not suicidal and would never hurt himself. His sleep is better with the extra quetiapine. However he still is taking very high amounts of melatonin (feels when he reduced it his sleep was worse) and still drinking the same amount at night. Pt is very angry with me and aggressive in his tone. He confronts me regarding what concrete things I can do to help him. I attempted to provide some supportive counseling regarding grief, help normalize the feelings he is having, and help assess his engagement in his support system. I also explained with his history of mental health issues, we would want to monitor his depression and make sure it starts to improve and we can consider medications to help with that. He in convinced that no medications will help him and that his time with me is pointless. He soon after stated there is not point in him being in the visit and concluded the appointment on his own and left the room. He did not want to discuss any follow up plans. ROS: unable to obtain from patient d/t poor cooperation    Brief Medical Hx:   HTN, DM, HLD, Vitamin D deficiency    Brief Psych Hx:  Hosp: several prior. Had 1 month admission at [facility].    Diagnoses: bipolar disorder, PTSD  Med trials: lithium, paxil, depakote, quetiapine, zolpidem (not effective), melatonin  Outpt: several prior psychiatrist but doesn't remember who.      Current Outpatient Medications   Medication Sig Dispense Refill    QUEtiapine (SEROQUEL) 400 MG tablet Take 1 tablet by mouth nightly 30 tablet 5    Continuous Blood Gluc Sensor (FREESTYLE MILLIE 14 DAY SENSOR) MISC 2 Units by Does not apply route as needed (use one sensor for 14 days) 2 each 3    insulin glargine (LANTUS SOLOSTAR) 100 UNIT/ML injection pen Inject 30 Units into the skin 2 times daily 10 pen 3    propranolol (INDERAL LA) 60 MG extended release capsule Take 1 capsule by mouth daily 30 capsule 5    metFORMIN (GLUCOPHAGE-XR) 500 MG extended release tablet TAKE 4 TABLETS BY MOUTH EVERY DAY WITH BREAKFAST 120 tablet 5    montelukast (SINGULAIR) 10 MG tablet Take 1 tablet by mouth nightly 30 tablet 5    buPROPion (WELLBUTRIN SR) 100 MG extended release tablet Take 1 tablet by mouth 2 times daily (Patient taking differently: Take 200 mg by mouth daily ) 180 tablet 3    primidone (MYSOLINE) 50 MG tablet TAKE ONE TABLET BY MOUTH EVERY NIGHT AT BEDTIME (Patient taking differently: TAKE ONE TABLET BY MOUTH EVERY MORNING) 90 tablet 3    lisinopril (PRINIVIL;ZESTRIL) 10 MG tablet Take 1 tablet by mouth daily 90 tablet 3    potassium chloride (KLOR-CON M) 10 MEQ extended release tablet Take 1 tablet by mouth daily 90 tablet 3    aspirin (ASPIRIN LOW DOSE) 81 MG EC tablet Take 1 tablet by mouth daily 90 tablet 3    bisacodyl (BISACODYL) 5 MG EC tablet Take 1 tablet by mouth daily as needed for Constipation 30 tablet 11    docusate sodium (COLACE) 100 MG capsule TAKE ONE CAPSULE BY MOUTH TWICE A DAY (Patient taking differently: TAKE ONE CAPSULE BY MOUTH DAILY) 180 capsule 3    tamsulosin (FLOMAX) 0.4 MG capsule TAKE ONE CAPSULE BY MOUTH DAILY 90 capsule 1    Continuous Blood Gluc  (FREESTYLE MILLIE 14 DAY READER) REEMA 1 Units by Does not apply route as needed (as needed) 1 Device 0    Continuous Blood Gluc Sensor (FREESTYLE MILLIE 14 DAY SENSOR) MISC 2 Units by Does not apply route as needed (use one sensor for 14 days) 2 each 3    Continuous Blood Gluc  (FREESTYLE MILLIE 14 DAY READER) REEMA 1 Units by Does not apply route as needed (as needed) 1 Device 0    Continuous Blood Gluc  (FREESTYLE MILLIE 14 DAY READER) REEMA 1 Units by Does not apply route as needed (as needed) 1 Device 0    Continuous Blood Gluc Sensor (FREESTYLE MILLIE 14 DAY SENSOR) MISC 2 Units by Does not apply route as needed (use one sensor for 14 days) 2 each 3    simvastatin (ZOCOR) 40 MG tablet TAKE 1 TABLET BY MOUTH NIGHTLY 90 tablet 1    vitamin D (ERGOCALCIFEROL) 47066 units CAPS capsule TAKE 1 CAPSULE BY MOUTH O NCE A WEEK 12 capsule 1    insulin aspart (NOVOLOG FLEXPEN) 100 UNIT/ML injection pen INJECT UP  UNITS BYSUBCUTANEOUS ROUTE THREE TIMES A DAY AS DIRECTED (Patient taking differently: INJECT UP  UNITS BYSUBCUTANEOUS ROUTE THREE TIMES A DAY AS DIRECTED - SLIDING SCALE) 5 pen 10    Melatonin 10 MG CAPS Take 80 mg by mouth nightly Pt taking 100 mg at bed time      diphenhydrAMINE (BENADRYL) 25 MG tablet Take 50 mg by mouth every 3-4 hours as needed for Allergies or Sleep        No current facility-administered medications for this visit.         O:  Wt Readings from Last 3 Encounters:   05/24/19 199 lb (90.3 kg)   05/03/19 201 lb (91.2 kg)   05/02/19 201 lb 3.2 oz (91.3 kg)     Temp Readings from Last 3 Encounters:   01/06/18 97.3 °F (36.3 °C)   08/10/17 98.5 °F (36.9 °C)   07/14/16 97 °F (36.1 °C) (Oral)     BP Readings from Last 3 Encounters:   06/28/19 124/68   05/24/19 (!) 152/90   05/03/19 122/70     Pulse Readings from Last 3 Encounters:   06/28/19 80   05/24/19 84   05/03/19 72     PHQ Scores 6/28/2019 5/3/2019 10/25/2018 8/31/2018 5/25/2018 2/23/2018 12/22/2017   PHQ2 Score 6 5 5 4 3 4 5   PHQ9 Score 22 18 20 13 18 13 12     Interpretation of Total Score Depression Severity: 1-4 = Minimal depression, 5-9 = Mild depression, 10-14 = Moderate depression, 15-19 = Moderately severe depression, 20-27 = Severe depression    Mental Status Exam:   Appearance    alert, uncooperative and poor eye contact  Motor: Normal strength and tone, No abnormal movements, tics or mannerisms. Speech    spontaneous, normal rate and volume is loud. Non-pressured  Mood/Affect    Depressed / irritable, constricted  Thought Process    linear and goal directed  Thought Content  Feels he'd be better off dead, but denies and suicidal ideation   Associations    logical connections  Attention/Concentration    intact  Memory   Appears to be grossly intact to recent and remote content  Insight/Judgement    limited / poor    Labs:   Orders Only on 05/02/2019   Component Date Value Ref Range Status    C-Peptide 05/02/2019 1.1  1.1 - 4.4 ng/mL Final    [facility] [id number] [facility], [address] [place] [address] [phone]       A:  57 yo M w/hx of bipolar disorder, PTSD. Dealing with acute grief response with worsening depression after loss of his wife. This gentlemen is quite guarded and resistant to any mental health treatment at this point. He clearly doesn't see value in it. He did not want to discuss any follow up care and did not engage in treatment options. He does not express any interest in returning. He is abusing alcohol and likely making his mood status worse. 1. Bipolar disorder, MRE depressed  2. Post traumatic stress disorder  3. Alcohol abuse r/o alcohol use disorder    P:   1. Continue quetiapine 400mg qhs, wellbutrin SR 200mg qam.   2. Needs to reduce alcohol use, and probably re-attempt reduction of his melatonin. However, pt did not stay long enough for us to discuss this. 3. May benefit more from psychotherapy rather than more medication changes, however likelihood of him engaging in this is low. Follow-up: At this point, given patient's statements today, I don't see there being a therapeutic relationship here and patient does not seem ready or willing to be in mental health treatment. So I will hand back his care to his PCP.  If he is interested in future medication changes to his psychotropic medications, I would assess

## 2019-07-11 ENCOUNTER — OFFICE VISIT (OUTPATIENT)
Dept: ENDOCRINOLOGY | Age: 64
End: 2019-07-11
Payer: COMMERCIAL

## 2019-07-11 VITALS
HEIGHT: 74 IN | HEART RATE: 71 BPM | OXYGEN SATURATION: 98 % | SYSTOLIC BLOOD PRESSURE: 123 MMHG | DIASTOLIC BLOOD PRESSURE: 76 MMHG | BODY MASS INDEX: 25.15 KG/M2 | RESPIRATION RATE: 16 BRPM | WEIGHT: 196 LBS

## 2019-07-11 DIAGNOSIS — E78.2 MIXED HYPERLIPIDEMIA: ICD-10-CM

## 2019-07-11 DIAGNOSIS — I10 ESSENTIAL HYPERTENSION: ICD-10-CM

## 2019-07-11 DIAGNOSIS — E11.65 UNCONTROLLED TYPE 2 DIABETES MELLITUS WITH HYPERGLYCEMIA (HCC): Primary | ICD-10-CM

## 2019-07-11 LAB — HBA1C MFR BLD: 9.5 %

## 2019-07-11 PROCEDURE — 99213 OFFICE O/P EST LOW 20 MIN: CPT | Performed by: NURSE PRACTITIONER

## 2019-07-11 PROCEDURE — 83036 HEMOGLOBIN GLYCOSYLATED A1C: CPT | Performed by: NURSE PRACTITIONER

## 2019-07-11 ASSESSMENT — ENCOUNTER SYMPTOMS
COLOR CHANGE: 0
NAUSEA: 0
DIARRHEA: 0
CONSTIPATION: 0
SHORTNESS OF BREATH: 0
EYE PAIN: 0

## 2019-07-11 NOTE — PROGRESS NOTES
by PCP           Diabetes type 2, uncontrolled ([facility] [address] - Primary     Lab Results   Component Value Date    LABA1C 9.5 07/11/2019     Slight improvement  Goal A1c is < 7%  Still using Freestyle sensors and is satisfied re utility  Continue to titrate Toujeo and correct for BG with novolog SSI even if not eating  Improve diet and nutritional content: discussed potential choices  Patient depressed  , understandable given recent bereavement, wants to \"joel the nursing home\"         Relevant Medications    Dulaglutide (TRULICITY) 8.44 DZ/1.8ZX SOPN    Other Relevant Orders    POCT glycosylated hemoglobin (Hb A1C) (Completed)        Return in about 3 months (around 10/11/2019). Greater than 25 minutes spent directly counseling patient about topics listed above (such as lifestyle modifications, preventative screenings and/or disease related processes).

## 2019-08-09 ENCOUNTER — HOSPITAL ENCOUNTER (OUTPATIENT)
Dept: GENERAL RADIOLOGY | Age: 64
Discharge: HOME | End: 2019-08-09
Payer: COMMERCIAL

## 2019-08-09 ENCOUNTER — HOSPITAL ENCOUNTER (OUTPATIENT)
Age: 64
Discharge: HOME | End: 2019-08-09
Payer: COMMERCIAL

## 2019-08-09 DIAGNOSIS — M65.30 TRIGGER FINGER, UNSPECIFIED FINGER, UNSPECIFIED LATERALITY: ICD-10-CM

## 2019-08-09 PROCEDURE — 73130 X-RAY EXAM OF HAND: CPT

## 2019-08-15 RX ORDER — DULAGLUTIDE 0.75 MG/.5ML
INJECTION, SOLUTION SUBCUTANEOUS
Qty: 2 ML | Refills: 0 | Status: SHIPPED | OUTPATIENT
Start: 2019-08-15 | End: 2019-08-17 | Stop reason: SDUPTHER

## 2019-08-17 DIAGNOSIS — J30.81 ALLERGIC RHINITIS DUE TO ANIMAL HAIR AND DANDER: ICD-10-CM

## 2019-08-19 RX ORDER — MONTELUKAST SODIUM 10 MG/1
TABLET ORAL
Qty: 30 TABLET | Refills: 5 | Status: SHIPPED | OUTPATIENT
Start: 2019-08-19 | End: 2020-01-09

## 2019-08-20 RX ORDER — DULAGLUTIDE 0.75 MG/.5ML
INJECTION, SOLUTION SUBCUTANEOUS
Qty: 2 ML | Refills: 3 | Status: SHIPPED | OUTPATIENT
Start: 2019-08-20 | End: 2019-10-10

## 2019-08-22 ENCOUNTER — OFFICE VISIT (OUTPATIENT)
Dept: INTERNAL MEDICINE CLINIC | Age: 64
End: 2019-08-22
Payer: COMMERCIAL

## 2019-08-22 VITALS
HEIGHT: 74 IN | BODY MASS INDEX: 26.44 KG/M2 | DIASTOLIC BLOOD PRESSURE: 64 MMHG | WEIGHT: 206 LBS | HEART RATE: 80 BPM | SYSTOLIC BLOOD PRESSURE: 114 MMHG

## 2019-08-22 DIAGNOSIS — Z79.4 TYPE 2 DIABETES MELLITUS WITHOUT COMPLICATION, WITH LONG-TERM CURRENT USE OF INSULIN (HCC): ICD-10-CM

## 2019-08-22 DIAGNOSIS — M79.642 PAIN IN BOTH HANDS: ICD-10-CM

## 2019-08-22 DIAGNOSIS — F33.1 MODERATE EPISODE OF RECURRENT MAJOR DEPRESSIVE DISORDER (HCC): ICD-10-CM

## 2019-08-22 DIAGNOSIS — M19.041 ARTHRITIS OF BOTH HANDS: ICD-10-CM

## 2019-08-22 DIAGNOSIS — M79.641 PAIN IN BOTH HANDS: ICD-10-CM

## 2019-08-22 DIAGNOSIS — N52.9 VASCULOGENIC ERECTILE DYSFUNCTION, UNSPECIFIED VASCULOGENIC ERECTILE DYSFUNCTION TYPE: ICD-10-CM

## 2019-08-22 DIAGNOSIS — I10 ESSENTIAL HYPERTENSION: ICD-10-CM

## 2019-08-22 DIAGNOSIS — E11.9 TYPE 2 DIABETES MELLITUS WITHOUT COMPLICATION, WITH LONG-TERM CURRENT USE OF INSULIN (HCC): ICD-10-CM

## 2019-08-22 DIAGNOSIS — M19.042 ARTHRITIS OF BOTH HANDS: ICD-10-CM

## 2019-08-22 DIAGNOSIS — F43.21 GRIEF: Primary | ICD-10-CM

## 2019-08-22 PROCEDURE — 99214 OFFICE O/P EST MOD 30 MIN: CPT | Performed by: NURSE PRACTITIONER

## 2019-08-22 RX ORDER — SILDENAFIL 50 MG/1
50 TABLET, FILM COATED ORAL PRN
Qty: 30 TABLET | Refills: 5 | Status: SHIPPED | OUTPATIENT
Start: 2019-08-22 | End: 2022-03-08

## 2019-08-23 ASSESSMENT — ENCOUNTER SYMPTOMS
RESPIRATORY NEGATIVE: 1
GASTROINTESTINAL NEGATIVE: 1

## 2019-08-23 NOTE — PROGRESS NOTES
 QUEtiapine (SEROQUEL) 400 MG tablet Take 1 tablet by mouth nightly 30 tablet 5    Continuous Blood Gluc Sensor (FREESTYLE MILLIE 14 DAY SENSOR) MISC 2 Units by Does not apply route as needed (use one sensor for 14 days) 2 each 3    insulin glargine (LANTUS SOLOSTAR) 100 UNIT/ML injection pen Inject 30 Units into the skin 2 times daily 10 pen 3    propranolol (INDERAL LA) 60 MG extended release capsule Take 1 capsule by mouth daily 30 capsule 5    metFORMIN (GLUCOPHAGE-XR) 500 MG extended release tablet TAKE 4 TABLETS BY MOUTH EVERY DAY WITH BREAKFAST 120 tablet 5    buPROPion (WELLBUTRIN SR) 100 MG extended release tablet Take 1 tablet by mouth 2 times daily (Patient taking differently: Take 200 mg by mouth daily ) 180 tablet 3    primidone (MYSOLINE) 50 MG tablet TAKE ONE TABLET BY MOUTH EVERY NIGHT AT BEDTIME (Patient taking differently: TAKE ONE TABLET BY MOUTH EVERY MORNING) 90 tablet 3    lisinopril (PRINIVIL;ZESTRIL) 10 MG tablet Take 1 tablet by mouth daily 90 tablet 3    potassium chloride (KLOR-CON M) 10 MEQ extended release tablet Take 1 tablet by mouth daily 90 tablet 3    aspirin (ASPIRIN LOW DOSE) 81 MG EC tablet Take 1 tablet by mouth daily 90 tablet 3    bisacodyl (BISACODYL) 5 MG EC tablet Take 1 tablet by mouth daily as needed for Constipation 30 tablet 11    docusate sodium (COLACE) 100 MG capsule TAKE ONE CAPSULE BY MOUTH TWICE A DAY (Patient taking differently: TAKE ONE CAPSULE BY MOUTH DAILY) 180 capsule 3    tamsulosin (FLOMAX) 0.4 MG capsule TAKE ONE CAPSULE BY MOUTH DAILY 90 capsule 1    Continuous Blood Gluc  (FREESTYLE MILLIE 14 DAY READER) REEMA 1 Units by Does not apply route as needed (as needed) 1 Device 0    Continuous Blood Gluc Sensor (FREESTYLE MILLIE 14 DAY SENSOR) MISC 2 Units by Does not apply route as needed (use one sensor for 14 days) 2 each 3    Continuous Blood Gluc  (FREESTYLE MILLIE 14 DAY READER) REEMA 1 Units by Does not apply route as needed (as needed) 1 Device 0    Continuous Blood Gluc  (FREESTYLE MILLIE 14 DAY READER) REEMA 1 Units by Does not apply route as needed (as needed) 1 Device 0    Continuous Blood Gluc Sensor (FREESTYLE MILLIE 14 DAY SENSOR) MISC 2 Units by Does not apply route as needed (use one sensor for 14 days) 2 each 3    simvastatin (ZOCOR) 40 MG tablet TAKE 1 TABLET BY MOUTH NIGHTLY 90 tablet 1    vitamin D (ERGOCALCIFEROL) 58326 units CAPS capsule TAKE 1 CAPSULE BY MOUTH O NCE A WEEK 12 capsule 1    insulin aspart (NOVOLOG FLEXPEN) 100 UNIT/ML injection pen INJECT UP  UNITS BYSUBCUTANEOUS ROUTE THREE TIMES A DAY AS DIRECTED (Patient taking differently: INJECT UP  UNITS BYSUBCUTANEOUS ROUTE THREE TIMES A DAY AS DIRECTED - SLIDING SCALE) 5 pen 10    Melatonin 10 MG CAPS Take 80 mg by mouth nightly Pt taking 100 mg at bed time      diphenhydrAMINE (BENADRYL) 25 MG tablet Take 50 mg by mouth every 3-4 hours as needed for Allergies or Sleep        No current facility-administered medications for this visit. Review of Systems   Constitutional: Negative. Respiratory: Negative. Cardiovascular: Negative. Gastrointestinal: Negative. Genitourinary: Negative. Musculoskeletal: Positive for arthralgias. Psychiatric/Behavioral: Positive for dysphoric mood. The patient is nervous/anxious. All other systems reviewed and are negative. OBJECTIVE:  Physical Exam   Constitutional: He is oriented to person, place, and time. He appears well-developed and well-nourished. He is cooperative. Unkept appearing gentleman in wheelchair who is alert, oriented   HENT:   Head: Normocephalic and atraumatic. Eyes:   Right eye closed   Neck: Neck supple. No JVD present. Cardiovascular: Normal rate and regular rhythm. Pulmonary/Chest: Effort normal and breath sounds normal. No respiratory distress. He has no wheezes. He has no rales. Abdominal: He exhibits no distension. There is no guarding.

## 2019-10-10 ENCOUNTER — OFFICE VISIT (OUTPATIENT)
Dept: ENDOCRINOLOGY | Age: 64
End: 2019-10-10
Payer: COMMERCIAL

## 2019-10-10 VITALS
HEIGHT: 74 IN | HEART RATE: 75 BPM | RESPIRATION RATE: 18 BRPM | SYSTOLIC BLOOD PRESSURE: 116 MMHG | BODY MASS INDEX: 25.72 KG/M2 | DIASTOLIC BLOOD PRESSURE: 68 MMHG | WEIGHT: 200.4 LBS | OXYGEN SATURATION: 97 %

## 2019-10-10 DIAGNOSIS — I10 ESSENTIAL HYPERTENSION: ICD-10-CM

## 2019-10-10 DIAGNOSIS — E11.65 UNCONTROLLED TYPE 2 DIABETES MELLITUS WITH HYPERGLYCEMIA (HCC): Primary | ICD-10-CM

## 2019-10-10 DIAGNOSIS — E78.2 MIXED HYPERLIPIDEMIA: ICD-10-CM

## 2019-10-10 LAB — HBA1C MFR BLD: 7.8 %

## 2019-10-10 PROCEDURE — 83036 HEMOGLOBIN GLYCOSYLATED A1C: CPT | Performed by: NURSE PRACTITIONER

## 2019-10-10 PROCEDURE — 99213 OFFICE O/P EST LOW 20 MIN: CPT | Performed by: NURSE PRACTITIONER

## 2019-10-10 ASSESSMENT — ENCOUNTER SYMPTOMS
NAUSEA: 0
EYE PAIN: 0
DIARRHEA: 0
SHORTNESS OF BREATH: 0
CONSTIPATION: 0
COLOR CHANGE: 0

## 2019-10-12 DIAGNOSIS — E11.65 UNCONTROLLED TYPE 2 DIABETES MELLITUS WITH HYPERGLYCEMIA (HCC): ICD-10-CM

## 2019-10-12 DIAGNOSIS — E78.2 MIXED HYPERLIPIDEMIA: ICD-10-CM

## 2019-10-12 DIAGNOSIS — N40.1 BENIGN PROSTATIC HYPERPLASIA WITH URINARY FREQUENCY: ICD-10-CM

## 2019-10-12 DIAGNOSIS — R35.0 BENIGN PROSTATIC HYPERPLASIA WITH URINARY FREQUENCY: ICD-10-CM

## 2019-10-14 RX ORDER — TAMSULOSIN HYDROCHLORIDE 0.4 MG/1
CAPSULE ORAL
Qty: 30 CAPSULE | Refills: 5 | Status: SHIPPED | OUTPATIENT
Start: 2019-10-14 | End: 2020-05-08

## 2019-10-14 RX ORDER — SIMVASTATIN 40 MG
TABLET ORAL
Qty: 30 TABLET | Refills: 5 | Status: SHIPPED | OUTPATIENT
Start: 2019-10-14 | End: 2020-05-08

## 2019-10-15 RX ORDER — FLASH GLUCOSE SENSOR
KIT MISCELLANEOUS
Qty: 2 EACH | Refills: 5 | Status: SHIPPED | OUTPATIENT
Start: 2019-10-15 | End: 2020-04-10

## 2019-11-11 DIAGNOSIS — G25.2 ACTION TREMOR: ICD-10-CM

## 2019-11-11 DIAGNOSIS — F43.10 POSTTRAUMATIC STRESS DISORDER: ICD-10-CM

## 2019-11-11 DIAGNOSIS — F33.9 RECURRENT MAJOR DEPRESSIVE EPISODES (HCC): ICD-10-CM

## 2019-11-11 DIAGNOSIS — F31.9 BIPOLAR AFFECTIVE DISORDER, REMISSION STATUS UNSPECIFIED (HCC): ICD-10-CM

## 2019-11-12 ENCOUNTER — TELEPHONE (OUTPATIENT)
Dept: ENDOCRINOLOGY | Age: 64
End: 2019-11-12

## 2019-11-12 RX ORDER — BUPROPION HYDROCHLORIDE 100 MG/1
TABLET, EXTENDED RELEASE ORAL
Qty: 60 TABLET | Refills: 1 | Status: SHIPPED | OUTPATIENT
Start: 2019-11-12 | End: 2020-01-13

## 2019-11-12 RX ORDER — PROPRANOLOL HCL 60 MG
CAPSULE, EXTENDED RELEASE 24HR ORAL
Qty: 30 CAPSULE | Refills: 1 | Status: SHIPPED | OUTPATIENT
Start: 2019-11-12 | End: 2020-02-10

## 2019-11-18 ENCOUNTER — TELEPHONE (OUTPATIENT)
Dept: INTERNAL MEDICINE CLINIC | Age: 64
End: 2019-11-18

## 2019-11-18 RX ORDER — RANITIDINE 150 MG/1
150 TABLET ORAL 2 TIMES DAILY
Qty: 60 TABLET | Refills: 3 | Status: SHIPPED | OUTPATIENT
Start: 2019-11-18 | End: 2021-01-14

## 2019-12-11 DIAGNOSIS — E11.9 TYPE 2 DIABETES MELLITUS WITHOUT COMPLICATION, WITH LONG-TERM CURRENT USE OF INSULIN (HCC): ICD-10-CM

## 2019-12-11 DIAGNOSIS — Z79.4 TYPE 2 DIABETES MELLITUS WITHOUT COMPLICATION, WITH LONG-TERM CURRENT USE OF INSULIN (HCC): ICD-10-CM

## 2019-12-11 RX ORDER — INSULIN GLARGINE 100 [IU]/ML
INJECTION, SOLUTION SUBCUTANEOUS
Qty: 30 ML | Refills: 0 | Status: SHIPPED | OUTPATIENT
Start: 2019-12-11 | End: 2020-01-13

## 2019-12-11 RX ORDER — METFORMIN HYDROCHLORIDE 500 MG/1
TABLET, EXTENDED RELEASE ORAL
Qty: 120 TABLET | Refills: 0 | Status: SHIPPED | OUTPATIENT
Start: 2019-12-11 | End: 2020-02-10

## 2019-12-13 DIAGNOSIS — Z79.4 TYPE 2 DIABETES MELLITUS WITHOUT COMPLICATION, WITH LONG-TERM CURRENT USE OF INSULIN (HCC): ICD-10-CM

## 2019-12-13 DIAGNOSIS — E11.9 TYPE 2 DIABETES MELLITUS WITHOUT COMPLICATION, WITH LONG-TERM CURRENT USE OF INSULIN (HCC): ICD-10-CM

## 2020-01-03 ENCOUNTER — OFFICE VISIT (OUTPATIENT)
Dept: INTERNAL MEDICINE CLINIC | Age: 65
End: 2020-01-03
Payer: COMMERCIAL

## 2020-01-03 VITALS
DIASTOLIC BLOOD PRESSURE: 72 MMHG | SYSTOLIC BLOOD PRESSURE: 128 MMHG | HEIGHT: 74 IN | WEIGHT: 198 LBS | BODY MASS INDEX: 25.41 KG/M2 | HEART RATE: 76 BPM

## 2020-01-03 PROCEDURE — G0009 ADMIN PNEUMOCOCCAL VACCINE: HCPCS | Performed by: NURSE PRACTITIONER

## 2020-01-03 PROCEDURE — 90732 PPSV23 VACC 2 YRS+ SUBQ/IM: CPT | Performed by: NURSE PRACTITIONER

## 2020-01-03 PROCEDURE — 99214 OFFICE O/P EST MOD 30 MIN: CPT | Performed by: NURSE PRACTITIONER

## 2020-01-03 NOTE — PROGRESS NOTES
SUBJECTIVE:    Patient ID: Saturnino Babin is a 71 y.o. male. CC: Hypertension, dyslipidemia, depression, diabetes    HPI: The patient presents to the office today for follow-up of chronic medical conditions. He has no new specific acute complaints today. Hypertension, dyslipidemia: He denies any chest pain or shortness of breath. He is compliant with his medication regimen. Diabetes: A1c improved to 7.8. He remains under the care of endocrinology. Depression, bipolar: He feels these conditions are stable. He is compliant with his medication regimen. Feels he is doing okay following the passing of his wife though admits the holidays were difficult. Past Medical History:   Diagnosis Date    Allergic rhinitis     arthri     Arthritis     lumbar spine    CAD (coronary artery disease)     Chronic kidney disease     retention, bph    Constipation     [facility] mellitus) ([facility])     Generalized pain     GERD (gastroesophageal reflux disease)     constipation    Heart attack ([address]     Heart disease     Hyperlipidemia     Movement disorder     lumbar spine arthritis    Psychiatric problem     ptsd, anxiety, depression    Urinary retention         Current Outpatient Medications   Medication Sig Dispense Refill    insulin aspart (NOVOLOG FLEXPEN) 100 UNIT/ML injection pen INJECT UP  UNITS SUB-Q 3 TIMES A DAY AS DIRECTED 15 mL 0    metFORMIN (GLUCOPHAGE-XR) 500 MG extended release tablet TAKE 4 TABLETS BY MOUTH EVERY DAY WITH BREAKFAST 120 tablet 0    BASAGLAR KWIKPEN 100 UNIT/ML injection pen INJECT 30 UNITS SUB-Q TWICE DAILY 30 mL 0    ranitidine (ZANTAC) 150 MG tablet Take 1 tablet by mouth 2 times daily 60 tablet 3    propranolol (INDERAL LA) 60 MG extended release capsule TAKE ONE CAPSULE BY MOUTH ONCE A DAY.  30 capsule 1    buPROPion (WELLBUTRIN SR) 100 MG extended release tablet TAKE ONE TABLET BY MOUTH 2 TIMES A DAY 60 tablet 1    Continuous Blood Gluc Sensor (FREESTYLE MILLIE 14 DAY SENSOR) MISC 2 UNITS BY DOES NOT APPLY ROUTE (USE ONE SENSOR FOR 14 DAYS) 2 each 5    simvastatin (ZOCOR) 40 MG tablet TAKE ONE TABLET BY MOUTH EVERY EVENING 30 tablet 5    tamsulosin (FLOMAX) 0.4 MG capsule TAKE ONE CAPSULE BY MOUTH EVERY DAY 30 capsule 5    montelukast (SINGULAIR) 10 MG tablet TAKE ONE TABLET BY MOUTH EVERY EVENING 30 tablet 5    QUEtiapine (SEROQUEL) 400 MG tablet Take 1 tablet by mouth nightly 30 tablet 5    primidone (MYSOLINE) 50 MG tablet TAKE ONE TABLET BY MOUTH EVERY NIGHT AT BEDTIME (Patient taking differently: TAKE ONE TABLET BY MOUTH EVERY MORNING) 90 tablet 3    lisinopril (PRINIVIL;ZESTRIL) 10 MG tablet Take 1 tablet by mouth daily 90 tablet 3    potassium chloride (KLOR-CON M) 10 MEQ extended release tablet Take 1 tablet by mouth daily 90 tablet 3    aspirin (ASPIRIN LOW DOSE) 81 MG EC tablet Take 1 tablet by mouth daily 90 tablet 3    bisacodyl (BISACODYL) 5 MG EC tablet Take 1 tablet by mouth daily as needed for Constipation 30 tablet 11    docusate sodium (COLACE) 100 MG capsule TAKE ONE CAPSULE BY MOUTH TWICE A DAY (Patient taking differently: TAKE ONE CAPSULE BY MOUTH DAILY) 180 capsule 3    Continuous Blood Gluc  (FREESTYLE MILLIE 14 DAY READER) REEMA 1 Units by Does not apply route as needed (as needed) 1 Device 0    Continuous Blood Gluc Sensor (FREESTYLE MILLIE 14 DAY SENSOR) MISC 2 Units by Does not apply route as needed (use one sensor for 14 days) 2 each 3    Continuous Blood Gluc  (FREESTYLE MILLIE 14 DAY READER) REEMA 1 Units by Does not apply route as needed (as needed) 1 Device 0    Continuous Blood Gluc  (FREESTYLE MILLIE 14 DAY READER) REEMA 1 Units by Does not apply route as needed (as needed) 1 Device 0    Continuous Blood Gluc Sensor (FREESTYLE MILLIE 14 DAY SENSOR) MISC 2 Units by Does not apply route as needed (use one sensor for 14 days) 2 each 3    Melatonin 10 MG CAPS Take 80 mg by mouth nightly Pt taking 100 mg at Mild depression, 10-14 = Moderate depression, 15-19 = Moderately severe depression, 20-27 =Severe depression        ASSESSMENT/PLAN:  David Webb was seen today for follow-up. Diagnoses and all orders for this visit:    Essential hypertension  - Normotensive  - he has met JNC standards.  - Continue current regimen. Mixed hyperlipidemia  -Denies chest pain or shortness of breath  -Continue current regimen    Recurrent major depressive episodes (HCC)  Bipolar affective disorder, remission status unspecified (HCC)  -Stable  -Continue current regimen    Type 2 diabetes mellitus without complication, with long-term current use of insulin (HCC)  -A1c 7.8  -Continue follow-up with endocrinology    Screening for AAA (abdominal aortic aneurysm)  -     US Abdominal Aorta Limited    Personal history of tobacco use  -     MA VISIT TO DISCUSS LUNG CA SCREEN W LDCT  -     CT Lung Screen (Annual); Future    Need for 23-polyvalent pneumococcal polysaccharide vaccine  -     PNEUMOVAX 23 subcutaneous/IM (Pneumococcal polysaccharide vaccine 23-valent >= 1yo)      Low Dose CT (LDCT) Lung Screening criteria met   Age 50-69   Pack year smoking >30   Still smoking or less than 15 year since quit   No sign or symptoms of lung cancer   > 11 months since last LDCT     Risks and benefits of lung cancer screening with LDCT scans discussed:    Significance of positive screen - False-positive LDCT results often occur. 95% of all positive results do not lead to a diagnosis of cancer. Usually further imaging can resolve most false-positive results; however, some patients may require invasive procedures. Over diagnosis risk - 10% to 12% of screen-detected lung cancer cases are over diagnosed--that is, the cancer would not have been detected in the patient's lifetime without the screening.     Need for follow up screens annually to continue lung cancer screening effectiveness     Risks associated with radiation from annual LDCT- Radiation exposure is about the same as for a mammogram, which is about 1/3 of the annual background radiation exposure from everyday life. Starting screening at age 54 is not likely to increase cancer risk from radiation exposure. Patients with comorbidities resulting in life expectancy of < 10 years, or that would preclude treatment of an abnormality identified on CT, should not be screened due to lack of benefit.     To obtain maximal benefit from this screening, smoking cessation and long-term abstinence from smoking is critical        Jo Kirk, APRN - CNP

## 2020-01-06 ASSESSMENT — ENCOUNTER SYMPTOMS
GASTROINTESTINAL NEGATIVE: 1
RESPIRATORY NEGATIVE: 1

## 2020-01-09 ENCOUNTER — OFFICE VISIT (OUTPATIENT)
Dept: ENDOCRINOLOGY | Age: 65
End: 2020-01-09
Payer: COMMERCIAL

## 2020-01-09 VITALS
DIASTOLIC BLOOD PRESSURE: 67 MMHG | SYSTOLIC BLOOD PRESSURE: 121 MMHG | OXYGEN SATURATION: 98 % | HEIGHT: 74 IN | BODY MASS INDEX: 25.41 KG/M2 | HEART RATE: 70 BPM | RESPIRATION RATE: 18 BRPM | WEIGHT: 198 LBS

## 2020-01-09 DIAGNOSIS — I10 ESSENTIAL HYPERTENSION: ICD-10-CM

## 2020-01-09 DIAGNOSIS — R53.82 CHRONIC FATIGUE: ICD-10-CM

## 2020-01-09 DIAGNOSIS — E78.2 MIXED HYPERLIPIDEMIA: ICD-10-CM

## 2020-01-09 DIAGNOSIS — E55.9 VITAMIN D DEFICIENCY: ICD-10-CM

## 2020-01-09 DIAGNOSIS — E11.65 UNCONTROLLED TYPE 2 DIABETES MELLITUS WITH HYPERGLYCEMIA (HCC): ICD-10-CM

## 2020-01-09 LAB
A/G RATIO: 1.4 (ref 1.1–2.2)
ALBUMIN SERPL-MCNC: 4.1 G/DL (ref 3.4–5)
ALP BLD-CCNC: 86 U/L (ref 40–129)
ALT SERPL-CCNC: 8 U/L (ref 10–40)
ANION GAP SERPL CALCULATED.3IONS-SCNC: 16 MMOL/L (ref 3–16)
AST SERPL-CCNC: 18 U/L (ref 15–37)
BILIRUB SERPL-MCNC: <0.2 MG/DL (ref 0–1)
BUN BLDV-MCNC: 15 MG/DL (ref 7–20)
CALCIUM SERPL-MCNC: 9.3 MG/DL (ref 8.3–10.6)
CHLORIDE BLD-SCNC: 97 MMOL/L (ref 99–110)
CHOLESTEROL, TOTAL: 100 MG/DL (ref 0–199)
CO2: 22 MMOL/L (ref 21–32)
CREAT SERPL-MCNC: 0.8 MG/DL (ref 0.8–1.3)
GFR AFRICAN AMERICAN: >60
GFR NON-AFRICAN AMERICAN: >60
GLOBULIN: 3 G/DL
GLUCOSE BLD-MCNC: 129 MG/DL (ref 70–99)
HBA1C MFR BLD: 7.7 %
HDLC SERPL-MCNC: 37 MG/DL (ref 40–60)
LDL CHOLESTEROL CALCULATED: 45 MG/DL
POTASSIUM SERPL-SCNC: 5.1 MMOL/L (ref 3.5–5.1)
SODIUM BLD-SCNC: 135 MMOL/L (ref 136–145)
T3 FREE: 2.2 PG/ML (ref 2.3–4.2)
T4 FREE: 1 NG/DL (ref 0.9–1.8)
TOTAL PROTEIN: 7.1 G/DL (ref 6.4–8.2)
TRIGL SERPL-MCNC: 91 MG/DL (ref 0–150)
TSH SERPL DL<=0.05 MIU/L-ACNC: 1.08 UIU/ML (ref 0.27–4.2)
VLDLC SERPL CALC-MCNC: 18 MG/DL

## 2020-01-09 PROCEDURE — 99213 OFFICE O/P EST LOW 20 MIN: CPT | Performed by: NURSE PRACTITIONER

## 2020-01-09 PROCEDURE — 83036 HEMOGLOBIN GLYCOSYLATED A1C: CPT | Performed by: NURSE PRACTITIONER

## 2020-01-09 ASSESSMENT — ENCOUNTER SYMPTOMS
SHORTNESS OF BREATH: 0
EYE PAIN: 0
COLOR CHANGE: 0
CONSTIPATION: 0
DIARRHEA: 0
NAUSEA: 0

## 2020-01-09 NOTE — PROGRESS NOTES
Endocrinology  Carmen Hicks, [facility]  Phone: [phone]   FAX: [phone]    Дмитрий Escalante is a 60year old male with a history of Diabetes Mellitus type 2 for 27 years    Last A1C:   Lab Results   Component Value Date    LABA1C 7.7 01/09/2020    LABA1C 7.8 10/10/2019    LABA1C 9.5 07/11/2019     Last BP Readings:   BP Readings from Last 3 Encounters:   01/09/20 121/67   01/03/20 128/72   10/10/19 116/68     Last LDL:   Lab Results   Component Value Date    LDLCALC 45 01/09/2020     Aspirin Use: 81 mg    Tobacco History:    Smoking status: Current Every Day Smoker     Packs/day: 2.00     Years: 41.00     Types: E-Cigarettes     Start date: 7/1/2014     Last attempt to quit: 1/1/2018    Smokeless tobacco: Never Used      Comment: using E-cig with nicotine. Last cigarette 1/1/18.  Alcohol use Yes     HPI  Chepe Campoverde is a 58 y.o. male  with a history of Diabetes Mellitus type 2 since he was in his early forties. Disease course has been variable and he is interested in improving A1c further. Has tried meformin , glybuide and glipizide with unsatisfactory reduction in A1c. Has brought his sugar / insulin log. He is at high risk for adverse events related to hypoglycemia   - frequent hypoglycemia in readings  - frequent erratic readings due to  issues as he is legally blind (enucleated eye)  - lives alone with no support and it is a safety concern given his frequent hypoglycemia  - unable to access food/drink quickly as he is wheelchair bound and cannot get to help quickly    Current regimen:   - Trulicity . 75 mg weekly--> no longer on it for s/e: Acid reflux and abdominal pain  - Metformin 4 x 500 mg QD  - Lantus: 70 units QAM--> 50 QHS--> 45 QHS--> 20 AM and 15AM-> 20/15  - Novalog: uses sliding scale per SSI and carb ratio 1:10; --> occasionally takes 2-4 units extra--> needs it QD only an adjusts per need--> approx 20 units QD over the day  - Injects 4 times a day  - Usually tests upto four or more times a day  BG readings per glucometer: checks 4 times daily  Average : 258--> 246--> 133  Highest:  368 --> 467--> 257 ( on TG)  Lowest : 45 ( reason not known)--> 72--> 86    Interim HPI 1/9/2020  Slight improvement in A1c inspite of dietary non-compliance over holiday season  Discused Sensor download  Stopped Trulicity due to s/e as above  Concerns for fatigue: questions re HGH and testosterone supplementation    Interim HPI 10/10/2019  Improved A1c. Is feeling more in control of the management of disease process  Follows Dr Monserrat Peguero, podiatrist for toe nail trimming  Sees Dr Gosia Germain @ [facility] fo eye exam and new glasses  Stopped seeing psych. Interim HPI 07/11/2019  Reviewed labs. C peptide is low normal and may consider GLP1. A1c is improved overall. Has stopped checking in recent past , depressed, trying to cope with bereavement. Wife of almost 30 years passed away in June. Does not want to return to psych/counseling per his statement. A1c slightly better likely due to poor intake/lower carb intake  Missed insulin doses frequently    Interim HPI 05/2/2019  Sensor downloaded and reviewed with patient  Reports reasonable satisfaction and improvement of diabetic monitoring  BG have been better: average 125  Range 16%> 180; 64%within ; 20% < 70 with 13% < 54 mg/dl  Error percentage approximately 15 mg/dl  Hypoglycemia usually at night   Has about 2 alcoholic drinks at night with no snacks     Interim HPI 04/4/2019  · A1c is stable but remains high.    · Was able to get the 14 day sensor and reader finally      Interim HPI 01/03/2019   · Canceled last 2 visits; reports dealing with the broken healthcare system ( r/t to both his wife and himself)  · Saw behavioral health for Bipolar/PTSD/anxiety/depression/insomnia: is schedulled to see psych tomorrow, Stephenie Koehler MD  · Follows routinely with PCP: Scot Mcnamara NP          Has improved his diet somewhat in interim and improved A1c  Reports unable to count carbs etc due to visual deficits, but is portion sizing better  Was unable to qualify for Dexcom sensor  Interested in applying for [facility]: will send     Interim HPI 8/31/18  Presents today without meter or log. Reports he has not been compliant a few weeks in recent past. Overwhelmed with the burden of disease management. Has been unsuccessful with appealing to his insurance for a Dexcom Sensor ( due to T2DM ) and is upset about that. Wife in nursing home and receiving subpar care which is also a major stressor. Continues to smoke. Reports decreased muscle strength but can transfer to bed to wheelchair independently. Interim HPI 5/25/18  Pesents today without meter or log. Has not checked recently as fingers are very sore and meter readings are highly variable. Worried about frequent lows as is dosing insulin without checking    Interim HPI 2/23/2018  ED visit on 1/6/18 for insomnia. Out of his medication and had not slept in 4 days. He has tried to get in touch with his provider Nile Diane cnp in [facility]    Patient brought a detailed log of BG and insulin dosing  States his current meter is not reliable with minute to minute variation in readings on rechecks  Log shows frequent lows in the 50s,   He is testing 4-6 times a day on the days that he feels well enough to check  Lowest in past 2 weeks: 51(fasting)  Highest in past 2 weeks: 431 (fasting); often has beer at night  Hypoglycemia awareness and symptoms:has chills, visual disturbances that resolve with correction of sugar levels  Lives alone, limited mobility, wheel chair bound      Complications:  · Retinopathy : right eye enucleated s/p glaucoma and severe corneal infection, left eye s/p cataract per patient  · Neuropathy:mild tingling in the feet.  Managed per podiatrist.  · Nephropathy:  Component    Latest Ref Rng & Units 10/26/2017   Microalbumin, Random Urine    <2.0 mg/dL <1.20   Creatinine, Ur    39.0 - 259.0 mg/dL Appears irritable, lack of sleep per patient   Skin: Skin is warm and dry. No skin changes or evidence of trauma. Psychiatric: He has a normal mood and affect. His behavior is normal. Thought content normal.   Vitals reviewed. Skeletal foot exam: declined exam, sees podiatrist    Gayle Sherman is an uncontrolled diabetic for 25 + years with long term insulin use for > 12 years associated with diabetic neuropathy, hypertension, hyperlipidemia, limited mobility, very poor vision (post glaucoma) and depression. Uses freestyle elvia sensors now. Plan  Problem List Items Addressed This Visit     Hyperlipidemia     LDL goal  < 100; recent at 101  TG not elevated at 110  Continue current regimen  Managed by PCP           Vitamin D deficiency     Check levels  Continue to supplement  May need additional supplementation         Relevant Orders    Vitamin D 25 Hydroxy (Completed)    Diabetes type 2, uncontrolled ([facility] [address] - Primary     Lab Results   Component Value Date    LABA1C 7.7 01/09/2020     A1c improving  Goal is < 7%    Steadily decreasing insulin dose based on a lower CHO diet  Reviewed titration of Long acting insulin  Reports feeling better  Using sensor optimally  Feels he is coping better with wife's loss             Relevant Orders    POCT glycosylated hemoglobin (Hb A1C) (Completed)    RESOLVED: Chronic fatigue    Relevant Orders    T3, Free (Completed)    T4, Free (Completed)    TSH without Reflex (Completed)        Return in about 3 months (around 4/9/2020). Greater than 25 minutes spent directly counseling patient about topics listed above (such as lifestyle modifications, preventative screenings and/or disease related processes).

## 2020-01-10 LAB — VITAMIN D 25-HYDROXY: 22.2 NG/ML

## 2020-01-13 PROBLEM — R53.82 CHRONIC FATIGUE: Status: RESOLVED | Noted: 2020-01-09 | Resolved: 2020-01-13

## 2020-01-13 RX ORDER — INSULIN GLARGINE 100 [IU]/ML
INJECTION, SOLUTION SUBCUTANEOUS
Qty: 30 ML | Refills: 0 | Status: SHIPPED | OUTPATIENT
Start: 2020-01-13 | End: 2020-03-12

## 2020-01-13 NOTE — ASSESSMENT & PLAN NOTE
Lab Results   Component Value Date    LABA1C 7.7 01/09/2020     A1c improving  Goal is < 7%    Steadily decreasing insulin dose based on a lower CHO diet  Reviewed titration of Long acting insulin  Reports feeling better  Using sensor optimally  Feels he is coping better with wife's loss

## 2020-01-14 RX ORDER — BUPROPION HYDROCHLORIDE 100 MG/1
TABLET, EXTENDED RELEASE ORAL
Qty: 60 TABLET | Refills: 5 | Status: SHIPPED | OUTPATIENT
Start: 2020-01-14 | End: 2020-07-31

## 2020-01-14 RX ORDER — QUETIAPINE FUMARATE 400 MG/1
TABLET, FILM COATED ORAL
Qty: 30 TABLET | Refills: 0 | Status: SHIPPED | OUTPATIENT
Start: 2020-01-14 | End: 2020-03-11

## 2020-02-10 RX ORDER — LISINOPRIL 10 MG/1
TABLET ORAL
Qty: 30 TABLET | Refills: 3 | Status: SHIPPED | OUTPATIENT
Start: 2020-02-10 | End: 2020-07-31

## 2020-02-10 RX ORDER — METFORMIN HYDROCHLORIDE 500 MG/1
TABLET, EXTENDED RELEASE ORAL
Qty: 120 TABLET | Refills: 0 | Status: SHIPPED | OUTPATIENT
Start: 2020-02-10 | End: 2020-05-04

## 2020-02-10 RX ORDER — ASPIRIN 81 MG/1
TABLET, COATED ORAL
Qty: 30 TABLET | Refills: 3 | Status: SHIPPED | OUTPATIENT
Start: 2020-02-10 | End: 2020-07-31

## 2020-02-10 RX ORDER — DULAGLUTIDE 0.75 MG/.5ML
INJECTION, SOLUTION SUBCUTANEOUS
Qty: 2 ML | Refills: 0 | Status: SHIPPED | OUTPATIENT
Start: 2020-02-10 | End: 2020-03-12

## 2020-02-10 RX ORDER — PROPRANOLOL HCL 60 MG
CAPSULE, EXTENDED RELEASE 24HR ORAL
Qty: 30 CAPSULE | Refills: 0 | Status: SHIPPED | OUTPATIENT
Start: 2020-02-10 | End: 2020-05-04

## 2020-02-10 RX ORDER — POTASSIUM CHLORIDE 750 MG/1
TABLET, FILM COATED, EXTENDED RELEASE ORAL
Qty: 30 TABLET | Refills: 3 | Status: SHIPPED | OUTPATIENT
Start: 2020-02-10 | End: 2020-07-31

## 2020-02-11 ENCOUNTER — TELEPHONE (OUTPATIENT)
Dept: RHEUMATOLOGY | Age: 65
End: 2020-02-11

## 2020-02-11 RX ORDER — PRIMIDONE 50 MG/1
TABLET ORAL
Qty: 30 TABLET | Refills: 0 | Status: SHIPPED | OUTPATIENT
Start: 2020-02-11 | End: 2020-05-04

## 2020-02-11 NOTE — TELEPHONE ENCOUNTER
Khloe Conroy from [place] [facility] called to say CT approved from 2/10/20-4/10/20  [id number] - approval number

## 2020-02-14 ENCOUNTER — HOSPITAL ENCOUNTER (OUTPATIENT)
Dept: CT IMAGING | Age: 65
Discharge: HOME | End: 2020-02-14
Payer: COMMERCIAL

## 2020-02-14 ENCOUNTER — HOSPITAL ENCOUNTER (OUTPATIENT)
Dept: ULTRASOUND IMAGING | Age: 65
Discharge: HOME | End: 2020-02-14
Payer: COMMERCIAL

## 2020-02-14 PROCEDURE — G0297 LDCT FOR LUNG CA SCREEN: HCPCS

## 2020-02-14 PROCEDURE — 76706 US ABDL AORTA SCREEN AAA: CPT

## 2020-03-10 RX ORDER — MONTELUKAST SODIUM 10 MG/1
TABLET ORAL
Qty: 30 TABLET | Refills: 0 | OUTPATIENT
Start: 2020-03-10

## 2020-03-10 RX ORDER — DOCUSATE SODIUM 100 MG/1
CAPSULE, LIQUID FILLED ORAL
Qty: 180 CAPSULE | Refills: 3 | Status: SHIPPED | OUTPATIENT
Start: 2020-03-10 | End: 2021-03-26

## 2020-03-10 RX ORDER — BISACODYL 5 MG
TABLET, DELAYED RELEASE (ENTERIC COATED) ORAL
Qty: 30 TABLET | Refills: 0 | Status: SHIPPED | OUTPATIENT
Start: 2020-03-10 | End: 2020-05-04

## 2020-03-11 RX ORDER — QUETIAPINE FUMARATE 400 MG/1
TABLET, FILM COATED ORAL
Qty: 30 TABLET | Refills: 2 | Status: SHIPPED | OUTPATIENT
Start: 2020-03-11 | End: 2020-07-31

## 2020-03-11 RX ORDER — RANITIDINE 300 MG/1
TABLET ORAL
Qty: 30 TABLET | Refills: 2 | Status: SHIPPED | OUTPATIENT
Start: 2020-03-11 | End: 2021-01-14

## 2020-03-12 RX ORDER — DULAGLUTIDE 0.75 MG/.5ML
INJECTION, SOLUTION SUBCUTANEOUS
Qty: 2 ML | Refills: 0 | Status: SHIPPED | OUTPATIENT
Start: 2020-03-12 | End: 2020-06-03 | Stop reason: SINTOL

## 2020-03-12 RX ORDER — INSULIN GLARGINE 100 [IU]/ML
INJECTION, SOLUTION SUBCUTANEOUS
Qty: 30 ML | Refills: 0 | Status: SHIPPED | OUTPATIENT
Start: 2020-03-12 | End: 2020-04-07 | Stop reason: SDUPTHER

## 2020-03-12 NOTE — TELEPHONE ENCOUNTER
Medication:   Requested Prescriptions     Pending Prescriptions Disp Refills    TRULICITY 9.58 QB/2.9ZE SOPN [Pharmacy Med Name: Randy Boast PEN (2ml/BX)] 2 mL 0     Sig: INJECT 0.75MG SUBCUTANEOUSLY ONCE A WEEK         Last appt: 1/9/2020   Next appt: 4/9/2020    Last OARRS:   RX Monitoring 5/3/2019   Attestation The Prescription Monitoring Report for this patient was reviewed today.    Periodic Controlled Substance Monitoring No signs of potential drug abuse or diversion identified: otherwise, see note documentation

## 2020-03-12 NOTE — TELEPHONE ENCOUNTER
Medication:   Requested Prescriptions     Pending Prescriptions Disp Refills    BASAGLAR KWIKPEN 100 UNIT/ML injection pen [Pharmacy Med Name: Bibi Virgeno 100 UNIT/ML KWIKPEN] 30 mL 0     Sig: INJECT 30 UNITS SUB-Q TWICE DAILY         Last appt: 1/9/2020   Next appt: 4/9/2020    Last OARRS:   RX Monitoring 5/3/2019   Attestation The Prescription Monitoring Report for this patient was reviewed today.    Periodic Controlled Substance Monitoring No signs of potential drug abuse or diversion identified: otherwise, see note documentation

## 2020-04-06 ENCOUNTER — TELEPHONE (OUTPATIENT)
Dept: ENDOCRINOLOGY | Age: 65
End: 2020-04-06

## 2020-04-06 NOTE — TELEPHONE ENCOUNTER
Patient needs refills on     Tegaderm- to go over sensors   novolog pens  basaglar pens  Pen needles      [facility] on [address] [place]

## 2020-04-07 RX ORDER — INSULIN ASPART 100 [IU]/ML
INJECTION, SOLUTION INTRAVENOUS; SUBCUTANEOUS
Qty: 15 ML | Refills: 0 | Status: SHIPPED | OUTPATIENT
Start: 2020-04-07 | End: 2020-06-09

## 2020-04-07 RX ORDER — PEN NEEDLE, DIABETIC 30 GX5/16"
1 NEEDLE, DISPOSABLE MISCELLANEOUS
Qty: 200 EACH | Refills: 3 | Status: SHIPPED | OUTPATIENT
Start: 2020-04-07

## 2020-04-07 RX ORDER — INSULIN GLARGINE 100 [IU]/ML
INJECTION, SOLUTION SUBCUTANEOUS
Qty: 30 ML | Refills: 0 | Status: SHIPPED | OUTPATIENT
Start: 2020-04-07 | End: 2020-06-09

## 2020-04-07 RX ORDER — TRANSPARENT DRESSING 4"X10"
1 BANDAGE TOPICAL
Qty: 100 EACH | Refills: 3 | Status: SHIPPED | OUTPATIENT
Start: 2020-04-07 | End: 2020-04-28 | Stop reason: SDUPTHER

## 2020-04-09 ENCOUNTER — TELEPHONE (OUTPATIENT)
Dept: ENDOCRINOLOGY | Age: 65
End: 2020-04-09

## 2020-04-10 RX ORDER — FLASH GLUCOSE SENSOR
KIT MISCELLANEOUS
Qty: 6 EACH | Refills: 2 | Status: SHIPPED | OUTPATIENT
Start: 2020-04-10 | End: 2020-04-28 | Stop reason: SDUPTHER

## 2020-04-28 ENCOUNTER — TELEPHONE (OUTPATIENT)
Dept: ENDOCRINOLOGY | Age: 65
End: 2020-04-28

## 2020-04-28 RX ORDER — TRANSPARENT DRESSING 4"X10"
1 BANDAGE TOPICAL
Qty: 100 EACH | Refills: 3 | Status: SHIPPED | OUTPATIENT
Start: 2020-04-28 | End: 2022-03-08

## 2020-04-28 RX ORDER — FLASH GLUCOSE SENSOR
KIT MISCELLANEOUS
Qty: 6 EACH | Refills: 2 | Status: SHIPPED | OUTPATIENT
Start: 2020-04-28 | End: 2020-10-02

## 2020-05-04 RX ORDER — PROPRANOLOL HCL 60 MG
CAPSULE, EXTENDED RELEASE 24HR ORAL
Qty: 30 CAPSULE | Refills: 2 | Status: SHIPPED | OUTPATIENT
Start: 2020-05-04 | End: 2020-09-02

## 2020-05-04 RX ORDER — PRIMIDONE 50 MG/1
TABLET ORAL
Qty: 30 TABLET | Refills: 2 | Status: SHIPPED | OUTPATIENT
Start: 2020-05-04 | End: 2020-09-02

## 2020-05-04 RX ORDER — METFORMIN HYDROCHLORIDE 500 MG/1
TABLET, EXTENDED RELEASE ORAL
Qty: 120 TABLET | Refills: 0 | Status: SHIPPED | OUTPATIENT
Start: 2020-05-04 | End: 2020-07-02

## 2020-05-04 RX ORDER — BISACODYL 5 MG
TABLET, DELAYED RELEASE (ENTERIC COATED) ORAL
Qty: 30 TABLET | Refills: 2 | Status: SHIPPED | OUTPATIENT
Start: 2020-05-04 | End: 2020-09-02

## 2020-05-10 RX ORDER — SIMVASTATIN 40 MG
TABLET ORAL
Qty: 30 TABLET | Refills: 2 | Status: SHIPPED | OUTPATIENT
Start: 2020-05-10 | End: 2020-10-02

## 2020-05-10 RX ORDER — TAMSULOSIN HYDROCHLORIDE 0.4 MG/1
CAPSULE ORAL
Qty: 30 CAPSULE | Refills: 2 | Status: SHIPPED | OUTPATIENT
Start: 2020-05-10 | End: 2020-10-02

## 2020-06-02 RX ORDER — DULAGLUTIDE 0.75 MG/.5ML
INJECTION, SOLUTION SUBCUTANEOUS
Qty: 2 ML | Refills: 0 | OUTPATIENT
Start: 2020-06-02

## 2020-06-09 RX ORDER — INSULIN ASPART 100 [IU]/ML
INJECTION, SOLUTION INTRAVENOUS; SUBCUTANEOUS
Qty: 15 ML | Refills: 0 | Status: SHIPPED | OUTPATIENT
Start: 2020-06-09 | End: 2021-01-14 | Stop reason: SDUPTHER

## 2020-06-09 RX ORDER — INSULIN GLARGINE 100 [IU]/ML
INJECTION, SOLUTION SUBCUTANEOUS
Qty: 30 ML | Refills: 0 | Status: SHIPPED | OUTPATIENT
Start: 2020-06-09 | End: 2020-10-02

## 2020-07-02 RX ORDER — METFORMIN HYDROCHLORIDE 500 MG/1
TABLET, EXTENDED RELEASE ORAL
Qty: 120 TABLET | Refills: 1 | Status: SHIPPED | OUTPATIENT
Start: 2020-07-02 | End: 2020-10-02

## 2020-07-31 RX ORDER — BUPROPION HYDROCHLORIDE 100 MG/1
TABLET, EXTENDED RELEASE ORAL
Qty: 60 TABLET | Refills: 0 | Status: SHIPPED | OUTPATIENT
Start: 2020-07-31 | End: 2020-10-02

## 2020-07-31 RX ORDER — QUETIAPINE FUMARATE 400 MG/1
TABLET, FILM COATED ORAL
Qty: 30 TABLET | Refills: 0 | Status: SHIPPED | OUTPATIENT
Start: 2020-07-31 | End: 2020-10-02

## 2020-07-31 RX ORDER — LISINOPRIL 10 MG/1
TABLET ORAL
Qty: 30 TABLET | Refills: 0 | Status: SHIPPED | OUTPATIENT
Start: 2020-07-31 | End: 2020-10-05

## 2020-07-31 RX ORDER — POTASSIUM CHLORIDE 750 MG/1
TABLET, FILM COATED, EXTENDED RELEASE ORAL
Qty: 30 TABLET | Refills: 0 | Status: SHIPPED | OUTPATIENT
Start: 2020-07-31 | End: 2020-10-02

## 2020-07-31 RX ORDER — ASPIRIN 81 MG/1
TABLET, COATED ORAL
Qty: 30 TABLET | Refills: 0 | Status: SHIPPED | OUTPATIENT
Start: 2020-07-31 | End: 2020-10-02

## 2020-10-02 RX ORDER — FLASH GLUCOSE SENSOR
KIT MISCELLANEOUS
Qty: 2 EACH | Refills: 0 | Status: SHIPPED | OUTPATIENT
Start: 2020-10-02 | End: 2020-12-02

## 2020-10-02 RX ORDER — INSULIN GLARGINE 100 [IU]/ML
INJECTION, SOLUTION SUBCUTANEOUS
Qty: 30 ML | Refills: 0 | Status: SHIPPED | OUTPATIENT
Start: 2020-10-02 | End: 2021-01-26

## 2020-10-02 NOTE — TELEPHONE ENCOUNTER
Medication:   Requested Prescriptions     Pending Prescriptions Disp Refills    Continuous Blood Gluc Sensor (FREESTYLE MILLIE 14 DAY SENSOR) MISC [Pharmacy Med Name: FREESTYLE MILLIE 14 DAY SENSOR]  0     Si UNITS BY DOES NOT APPLY ROUTE (USE ONE SENSOR FOR 14 DAYS)    BASAGLAR KWIKPEN 100 UNIT/ML injection pen [Pharmacy Med Name: Lisa Nip 100 UNIT/ML KWIKPEN] 30 mL 0     Sig: INJECT 30 UNITS SUB-Q TWICE DAILY         Last appt: 2020   Next appt: Visit date not found    Last Labs DM:   Lab Results   Component Value Date    LABA1C 7.7 2020

## 2020-10-05 RX ORDER — QUETIAPINE FUMARATE 400 MG/1
TABLET, FILM COATED ORAL
Qty: 30 TABLET | Refills: 0 | Status: SHIPPED | OUTPATIENT
Start: 2020-10-05 | End: 2021-01-06 | Stop reason: SDUPTHER

## 2020-10-05 RX ORDER — SIMVASTATIN 40 MG
TABLET ORAL
Qty: 30 TABLET | Refills: 0 | Status: SHIPPED | OUTPATIENT
Start: 2020-10-05 | End: 2021-01-14 | Stop reason: SDUPTHER

## 2020-10-05 RX ORDER — TAMSULOSIN HYDROCHLORIDE 0.4 MG/1
CAPSULE ORAL
Qty: 30 CAPSULE | Refills: 0 | Status: SHIPPED | OUTPATIENT
Start: 2020-10-05 | End: 2020-12-02

## 2020-10-05 RX ORDER — METFORMIN HYDROCHLORIDE 500 MG/1
TABLET, EXTENDED RELEASE ORAL
Qty: 120 TABLET | Refills: 0 | Status: SHIPPED | OUTPATIENT
Start: 2020-10-05 | End: 2020-12-02

## 2020-10-05 RX ORDER — POTASSIUM CHLORIDE 750 MG/1
TABLET, FILM COATED, EXTENDED RELEASE ORAL
Qty: 30 TABLET | Refills: 0 | Status: SHIPPED | OUTPATIENT
Start: 2020-10-05 | End: 2021-01-14 | Stop reason: SDUPTHER

## 2020-10-05 RX ORDER — BUPROPION HYDROCHLORIDE 100 MG/1
TABLET, EXTENDED RELEASE ORAL
Qty: 60 TABLET | Refills: 0 | Status: SHIPPED | OUTPATIENT
Start: 2020-10-05 | End: 2021-01-14 | Stop reason: SDUPTHER

## 2020-10-05 RX ORDER — ASPIRIN 81 MG/1
TABLET, COATED ORAL
Qty: 30 TABLET | Refills: 0 | Status: SHIPPED | OUTPATIENT
Start: 2020-10-05 | End: 2021-01-14 | Stop reason: SDUPTHER

## 2020-10-05 RX ORDER — LISINOPRIL 10 MG/1
TABLET ORAL
Qty: 30 TABLET | Refills: 0 | Status: SHIPPED | OUTPATIENT
Start: 2020-10-05 | End: 2021-01-14 | Stop reason: SDUPTHER

## 2020-12-02 RX ORDER — FLASH GLUCOSE SENSOR
KIT MISCELLANEOUS
Qty: 2 EACH | Refills: 5 | Status: SHIPPED | OUTPATIENT
Start: 2020-12-02 | End: 2021-07-26 | Stop reason: SDUPTHER

## 2020-12-02 NOTE — TELEPHONE ENCOUNTER
Medication:   Requested Prescriptions     Pending Prescriptions Disp Refills    Continuous Blood Gluc Sensor (FREESTYLE MILLIE 14 DAY SENSOR) MISC [Pharmacy Med Name: Lauryn Wolf 14 DAY SENSOR] 2 each 5     Sig: USE FOR CONTINUOUS GLUCOSE MONITORING.  CHANGE SENSOR EVERY 14 DAYS         Last appt: 1/9/2020   Next appt: Visit date not found    Last Labs DM:   Lab Results   Component Value Date    LABA1C 7.7 01/09/2020

## 2021-01-06 ENCOUNTER — TELEPHONE (OUTPATIENT)
Dept: INTERNAL MEDICINE CLINIC | Age: 66
End: 2021-01-06

## 2021-01-06 DIAGNOSIS — F31.32 BIPOLAR AFFECTIVE DISORDER, CURRENTLY DEPRESSED, MODERATE (HCC): ICD-10-CM

## 2021-01-06 RX ORDER — QUETIAPINE FUMARATE 400 MG/1
TABLET, FILM COATED ORAL
Qty: 30 TABLET | Refills: 0 | Status: SHIPPED | OUTPATIENT
Start: 2021-01-06 | End: 2021-01-14 | Stop reason: SDUPTHER

## 2021-01-14 ENCOUNTER — OFFICE VISIT (OUTPATIENT)
Dept: INTERNAL MEDICINE CLINIC | Age: 66
End: 2021-01-14
Payer: COMMERCIAL

## 2021-01-14 VITALS
DIASTOLIC BLOOD PRESSURE: 64 MMHG | WEIGHT: 164 LBS | SYSTOLIC BLOOD PRESSURE: 120 MMHG | HEART RATE: 98 BPM | BODY MASS INDEX: 22.96 KG/M2 | HEIGHT: 71 IN | OXYGEN SATURATION: 94 % | TEMPERATURE: 95.4 F

## 2021-01-14 DIAGNOSIS — E55.9 VITAMIN D DEFICIENCY: ICD-10-CM

## 2021-01-14 DIAGNOSIS — F43.10 POSTTRAUMATIC STRESS DISORDER: ICD-10-CM

## 2021-01-14 DIAGNOSIS — Z79.4 TYPE 2 DIABETES MELLITUS WITHOUT COMPLICATION, WITH LONG-TERM CURRENT USE OF INSULIN (HCC): ICD-10-CM

## 2021-01-14 DIAGNOSIS — Z23 NEED FOR INFLUENZA VACCINATION: ICD-10-CM

## 2021-01-14 DIAGNOSIS — F33.9 RECURRENT MAJOR DEPRESSIVE EPISODES (HCC): ICD-10-CM

## 2021-01-14 DIAGNOSIS — N40.1 BENIGN PROSTATIC HYPERPLASIA WITH URINARY FREQUENCY: ICD-10-CM

## 2021-01-14 DIAGNOSIS — E78.2 MIXED HYPERLIPIDEMIA: ICD-10-CM

## 2021-01-14 DIAGNOSIS — E11.9 TYPE 2 DIABETES MELLITUS WITHOUT COMPLICATION, WITH LONG-TERM CURRENT USE OF INSULIN (HCC): ICD-10-CM

## 2021-01-14 DIAGNOSIS — G25.2 ACTION TREMOR: ICD-10-CM

## 2021-01-14 DIAGNOSIS — I10 ESSENTIAL HYPERTENSION: Primary | ICD-10-CM

## 2021-01-14 DIAGNOSIS — F31.32 BIPOLAR AFFECTIVE DISORDER, CURRENTLY DEPRESSED, MODERATE (HCC): ICD-10-CM

## 2021-01-14 DIAGNOSIS — R35.0 BENIGN PROSTATIC HYPERPLASIA WITH URINARY FREQUENCY: ICD-10-CM

## 2021-01-14 PROCEDURE — 99214 OFFICE O/P EST MOD 30 MIN: CPT | Performed by: NURSE PRACTITIONER

## 2021-01-14 PROCEDURE — 90674 CCIIV4 VAC NO PRSV 0.5 ML IM: CPT | Performed by: NURSE PRACTITIONER

## 2021-01-14 PROCEDURE — G0008 ADMIN INFLUENZA VIRUS VAC: HCPCS | Performed by: NURSE PRACTITIONER

## 2021-01-14 RX ORDER — BUPROPION HYDROCHLORIDE 100 MG/1
100 TABLET, EXTENDED RELEASE ORAL 2 TIMES DAILY
Qty: 60 TABLET | Refills: 11 | Status: SHIPPED | OUTPATIENT
Start: 2021-01-14 | End: 2022-03-04 | Stop reason: SDUPTHER

## 2021-01-14 RX ORDER — METFORMIN HYDROCHLORIDE 500 MG/1
2000 TABLET, EXTENDED RELEASE ORAL
Qty: 120 TABLET | Refills: 11 | Status: SHIPPED | OUTPATIENT
Start: 2021-01-14 | End: 2022-03-04 | Stop reason: SDUPTHER

## 2021-01-14 RX ORDER — PRIMIDONE 50 MG/1
50 TABLET ORAL DAILY
Qty: 30 TABLET | Refills: 11 | Status: SHIPPED | OUTPATIENT
Start: 2021-01-14 | End: 2022-03-04 | Stop reason: SDUPTHER

## 2021-01-14 RX ORDER — LISINOPRIL 10 MG/1
10 TABLET ORAL DAILY
Qty: 30 TABLET | Refills: 11 | Status: SHIPPED | OUTPATIENT
Start: 2021-01-14 | End: 2022-03-04 | Stop reason: SDUPTHER

## 2021-01-14 RX ORDER — ASPIRIN 81 MG/1
81 TABLET ORAL DAILY
Qty: 30 TABLET | Refills: 11 | Status: SHIPPED | OUTPATIENT
Start: 2021-01-14 | End: 2022-06-03

## 2021-01-14 RX ORDER — PROPRANOLOL HCL 60 MG
60 CAPSULE, EXTENDED RELEASE 24HR ORAL DAILY
Qty: 30 CAPSULE | Refills: 11 | Status: SHIPPED | OUTPATIENT
Start: 2021-01-14 | End: 2022-03-04 | Stop reason: SDUPTHER

## 2021-01-14 RX ORDER — POTASSIUM CHLORIDE 750 MG/1
10 TABLET, FILM COATED, EXTENDED RELEASE ORAL DAILY
Qty: 30 TABLET | Refills: 11 | Status: SHIPPED | OUTPATIENT
Start: 2021-01-14 | End: 2022-03-04 | Stop reason: SDUPTHER

## 2021-01-14 RX ORDER — SIMVASTATIN 40 MG
40 TABLET ORAL NIGHTLY
Qty: 30 TABLET | Refills: 11 | Status: SHIPPED | OUTPATIENT
Start: 2021-01-14 | End: 2022-03-04 | Stop reason: SDUPTHER

## 2021-01-14 RX ORDER — QUETIAPINE FUMARATE 400 MG/1
TABLET, FILM COATED ORAL
Qty: 30 TABLET | Refills: 11 | Status: SHIPPED | OUTPATIENT
Start: 2021-01-14 | End: 2022-03-03 | Stop reason: SDUPTHER

## 2021-01-14 RX ORDER — TAMSULOSIN HYDROCHLORIDE 0.4 MG/1
0.4 CAPSULE ORAL DAILY
Qty: 30 CAPSULE | Refills: 11 | Status: SHIPPED | OUTPATIENT
Start: 2021-01-14 | End: 2022-03-04 | Stop reason: SDUPTHER

## 2021-01-14 RX ORDER — INSULIN ASPART 100 [IU]/ML
INJECTION, SOLUTION INTRAVENOUS; SUBCUTANEOUS
Qty: 15 ML | Refills: 11 | Status: SHIPPED | OUTPATIENT
Start: 2021-01-14 | End: 2022-08-18

## 2021-01-14 ASSESSMENT — PATIENT HEALTH QUESTIONNAIRE - PHQ9
1. LITTLE INTEREST OR PLEASURE IN DOING THINGS: 0
SUM OF ALL RESPONSES TO PHQ QUESTIONS 1-9: 0
SUM OF ALL RESPONSES TO PHQ9 QUESTIONS 1 & 2: 0

## 2021-01-18 ASSESSMENT — ENCOUNTER SYMPTOMS
RESPIRATORY NEGATIVE: 1
GASTROINTESTINAL NEGATIVE: 1

## 2021-01-18 NOTE — PROGRESS NOTES
SUBJECTIVE:    Patient ID: Yolanda Diaz is a 76 y.o. male. CC: Hypertension, dyslipidemia, depression, diabetes    HPI: The patient presents to the office today for follow-up of chronic medical conditions. He has no new specific acute complaints today. Hypertension, dyslipidemia: He denies any chest pain or shortness of breath. He is compliant with his medication regimen. Diabetes: A1c improved to 7.8. He remains under the care of endocrinology. Depression, bipolar: He feels these conditions are stable. He is compliant with his medication regimen. Feels he is doing okay following the passing of his wife though admits the holidays were difficult. Past Medical History:   Diagnosis Date    Allergic rhinitis     arthri     Arthritis     lumbar spine    CAD (coronary artery disease)     Chronic kidney disease     retention, bph    Constipation     [facility]diabetes mellitus) ([facility]     Generalized pain     GERD (gastroesophageal reflux disease)     constipation    Heart attack ([address]     Heart disease     Hyperlipidemia     Movement disorder     lumbar spine arthritis    Psychiatric problem     ptsd, anxiety, depression    Urinary retention         Current Outpatient Medications   Medication Sig Dispense Refill    QUEtiapine (SEROQUEL) 400 MG tablet TAKE ONE TABLET BY MOUTH DAILY IN THE EVENING.  30 tablet 11    metFORMIN (GLUCOPHAGE-XR) 500 MG extended release tablet Take 4 tablets by mouth daily (with breakfast) 120 tablet 11    tamsulosin (FLOMAX) 0.4 MG capsule Take 1 capsule by mouth daily 30 capsule 11    simvastatin (ZOCOR) 40 MG tablet Take 1 tablet by mouth nightly 30 tablet 11    lisinopril (PRINIVIL;ZESTRIL) 10 MG tablet Take 1 tablet by mouth daily 30 tablet 11    aspirin (ASPIRIN LOW DOSE) 81 MG EC tablet Take 1 tablet by mouth daily 30 tablet 11    buPROPion (WELLBUTRIN SR) 100 MG extended release tablet Take 1 tablet by mouth 2 times daily 60 tablet 11    potassium chloride (KLOR-CON) 10 MEQ extended release tablet Take 1 tablet by mouth daily 30 tablet 11    propranolol (INDERAL LA) 60 MG extended release capsule Take 1 capsule by mouth daily 30 capsule 11    insulin aspart (NOVOLOG FLEXPEN) 100 UNIT/ML injection pen Up to 100units Tid 15 mL 11    primidone (MYSOLINE) 50 MG tablet Take 1 tablet by mouth daily 30 tablet 11    Continuous Blood Gluc Sensor (FREESTYLE MILLIE 14 DAY SENSOR) MISC USE FOR CONTINUOUS GLUCOSE MONITORING. CHANGE SENSOR EVERY 14 DAYS 2 each 5    BASAGLAR KWIKPEN 100 UNIT/ML injection pen INJECT 30 UNITS SUB-Q TWICE DAILY 30 mL 0    BISACODYL 5 MG EC tablet TAKE ONE TABLET BY MOUTH EVERY DAY AS NEEDED FOR CONSTIPATION 30 tablet 2    Transparent Dressings (TEGADERM FILM 4\"X10\") MISC 1 each by Does not apply route every 14 days 100 each 3    Insulin Pen Needle (PEN NEEDLES 5/16\") 30G X 8 MM MISC 1 each by Does not apply route 5 times daily 200 each 3    docusate sodium (COLACE) 100 MG capsule TAKE ONE CAPSULE BY MOUTH TWICE A  capsule 3    sildenafil (VIAGRA) 50 MG tablet Take 1 tablet by mouth as needed for Erectile Dysfunction 30 tablet 5    Continuous Blood Gluc  (FREESTYLE MILLIE 14 DAY READER) REEMA 1 Units by Does not apply route as needed (as needed) 1 Device 0    Continuous Blood Gluc  (FREESTYLE MILLIE 14 DAY READER) REEMA 1 Units by Does not apply route as needed (as needed) 1 Device 0    Continuous Blood Gluc  (FREESTYLE MILLIE 14 DAY READER) REEMA 1 Units by Does not apply route as needed (as needed) 1 Device 0    Melatonin 10 MG CAPS Take 80 mg by mouth nightly Pt taking 100 mg at bed time      diphenhydrAMINE (BENADRYL) 25 MG tablet Take 50 mg by mouth every 3-4 hours as needed for Allergies or Sleep        No current facility-administered medications for this visit. Review of Systems   Constitutional: Negative. Respiratory: Negative. Cardiovascular: Negative.     Gastrointestinal: Negative. Genitourinary: Negative. Musculoskeletal: Positive for arthralgias. Neurological: Negative. Psychiatric/Behavioral: Negative. All other systems reviewed and are negative. OBJECTIVE:  Physical Exam  Constitutional:       Appearance: He is well-developed. Comments: Unkept appearing gentleman in wheelchair who is alert, oriented   HENT:      Head: Normocephalic and atraumatic. Eyes:      Comments: Right eye closed   Neck:      Musculoskeletal: Neck supple. Vascular: No JVD. Cardiovascular:      Rate and Rhythm: Normal rate and regular rhythm. Pulmonary:      Effort: Pulmonary effort is normal. No respiratory distress. Breath sounds: Normal breath sounds. No wheezing or rales. Abdominal:      General: There is no distension. Tenderness: There is no guarding. Musculoskeletal:         General: No tenderness. Skin:     General: Skin is warm and dry. Neurological:      Mental Status: He is alert and oriented to person, place, and time. Psychiatric:         Attention and Perception: Attention normal.         Mood and Affect: Affect normal.         Speech: Speech normal.         Behavior: Behavior is cooperative. /64   Pulse 98   Temp 95.4 °F (35.2 °C)   Ht 5' 11\" (1.803 m)   Wt 164 lb (74.4 kg)   SpO2 94%   BMI 22.87 kg/m²      PHQ Scores 1/14/2021 6/28/2019 5/3/2019 10/25/2018 8/31/2018 5/25/2018 2/23/2018   PHQ2 Score 0 6 5 5 4 3 4   PHQ9 Score 0 22 18 20 13 18 13     Interpretation of Total Score Depression Severity: 1-4 = Minimal depression, 5-9 = Mild depression, 10-14 = Moderate depression, 15-19 = Moderately severe depression, 20-27 =Severe depression        Assessment/Plan:  [facility] was seen today for hypertension and diabetes. Diagnoses and all orders for this visit:    Essential hypertension  -     lisinopril (PRINIVIL;ZESTRIL) 10 MG tablet;  Take 1 tablet by mouth daily  -     potassium chloride (KLOR-CON) 10 MEQ extended release

## 2021-01-26 DIAGNOSIS — Z79.4 TYPE 2 DIABETES MELLITUS WITHOUT COMPLICATION, WITH LONG-TERM CURRENT USE OF INSULIN (HCC): ICD-10-CM

## 2021-01-26 DIAGNOSIS — E11.9 TYPE 2 DIABETES MELLITUS WITHOUT COMPLICATION, WITH LONG-TERM CURRENT USE OF INSULIN (HCC): ICD-10-CM

## 2021-01-26 RX ORDER — INSULIN GLARGINE 100 [IU]/ML
INJECTION, SOLUTION SUBCUTANEOUS
Qty: 30 ML | Refills: 0 | Status: SHIPPED | OUTPATIENT
Start: 2021-01-26 | End: 2021-05-03

## 2021-01-26 NOTE — TELEPHONE ENCOUNTER
Medication:   Requested Prescriptions     Pending Prescriptions Disp Refills    BASAGLAR KWIKPEN 100 UNIT/ML injection pen [Pharmacy Med Name: Elliot Mondragon 100 UNIT/ML KWIKPEN] 30 mL 0     Sig: INJECT 30 UNITS SUB-Q TWICE DAILY       Last Filled:      Patient Phone Number: [phone] (home)     Last appt: 01/09/2020  Next appt: Visit date not found    Last Labs DM:   Lab Results   Component Value Date    LABA1C 7.7 01/09/2020

## 2021-01-28 RX ORDER — BISACODYL 5 MG
TABLET, DELAYED RELEASE (ENTERIC COATED) ORAL
Qty: 30 TABLET | Refills: 0 | Status: SHIPPED | OUTPATIENT
Start: 2021-01-28 | End: 2021-03-26

## 2021-02-09 ENCOUNTER — HOSPITAL ENCOUNTER (EMERGENCY)
Age: 66
Discharge: HOME | End: 2021-02-10
Attending: EMERGENCY MEDICINE
Payer: COMMERCIAL

## 2021-02-09 DIAGNOSIS — S09.90XA INJURY OF HEAD, INITIAL ENCOUNTER: Primary | ICD-10-CM

## 2021-02-09 PROCEDURE — 99284 EMERGENCY DEPT VISIT MOD MDM: CPT

## 2021-02-10 ENCOUNTER — APPOINTMENT (OUTPATIENT)
Dept: CT IMAGING | Age: 66
End: 2021-02-10
Payer: COMMERCIAL

## 2021-02-10 ENCOUNTER — APPOINTMENT (OUTPATIENT)
Dept: GENERAL RADIOLOGY | Age: 66
End: 2021-02-10
Payer: COMMERCIAL

## 2021-02-10 VITALS
SYSTOLIC BLOOD PRESSURE: 102 MMHG | TEMPERATURE: 98 F | DIASTOLIC BLOOD PRESSURE: 74 MMHG | OXYGEN SATURATION: 98 % | RESPIRATION RATE: 16 BRPM | HEART RATE: 77 BPM

## 2021-02-10 LAB
ALBUMIN SERPL-MCNC: 2.8 G/DL (ref 3.4–5)
ALP BLD-CCNC: 58 U/L (ref 40–129)
ALT SERPL-CCNC: 6 U/L (ref 10–40)
ANION GAP SERPL CALCULATED.3IONS-SCNC: 10 MMOL/L (ref 3–16)
AST SERPL-CCNC: 12 U/L (ref 15–37)
BASOPHILS ABSOLUTE: 0 K/UL (ref 0–0.2)
BASOPHILS RELATIVE PERCENT: 0.5 %
BILIRUB SERPL-MCNC: <0.2 MG/DL (ref 0–1)
BILIRUBIN DIRECT: <0.2 MG/DL (ref 0–0.3)
BILIRUBIN, INDIRECT: ABNORMAL MG/DL (ref 0–1)
BUN BLDV-MCNC: 22 MG/DL (ref 7–20)
CALCIUM SERPL-MCNC: 8.3 MG/DL (ref 8.3–10.6)
CHLORIDE BLD-SCNC: 97 MMOL/L (ref 99–110)
CO2: 23 MMOL/L (ref 21–32)
CREAT SERPL-MCNC: 0.7 MG/DL (ref 0.8–1.3)
EOSINOPHILS ABSOLUTE: 0.1 K/UL (ref 0–0.6)
EOSINOPHILS RELATIVE PERCENT: 1.4 %
GFR AFRICAN AMERICAN: >60
GFR NON-AFRICAN AMERICAN: >60
GLUCOSE BLD-MCNC: 309 MG/DL (ref 70–99)
HCT VFR BLD CALC: 36 % (ref 40.5–52.5)
HEMOGLOBIN: 11.7 G/DL (ref 13.5–17.5)
LYMPHOCYTES ABSOLUTE: 2.5 K/UL (ref 1–5.1)
LYMPHOCYTES RELATIVE PERCENT: 29.4 %
MCH RBC QN AUTO: 30.8 PG (ref 26–34)
MCHC RBC AUTO-ENTMCNC: 32.6 G/DL (ref 31–36)
MCV RBC AUTO: 94.5 FL (ref 80–100)
MONOCYTES ABSOLUTE: 0.8 K/UL (ref 0–1.3)
MONOCYTES RELATIVE PERCENT: 9.7 %
NEUTROPHILS ABSOLUTE: 5 K/UL (ref 1.7–7.7)
NEUTROPHILS RELATIVE PERCENT: 59 %
PDW BLD-RTO: 14.4 % (ref 12.4–15.4)
PLATELET # BLD: 141 K/UL (ref 135–450)
PMV BLD AUTO: 9.6 FL (ref 5–10.5)
POTASSIUM REFLEX MAGNESIUM: 4.1 MMOL/L (ref 3.5–5.1)
RBC # BLD: 3.81 M/UL (ref 4.2–5.9)
SODIUM BLD-SCNC: 130 MMOL/L (ref 136–145)
TOTAL PROTEIN: 5.1 G/DL (ref 6.4–8.2)
WBC # BLD: 8.5 K/UL (ref 4–11)

## 2021-02-10 PROCEDURE — 85025 COMPLETE CBC W/AUTO DIFF WBC: CPT

## 2021-02-10 PROCEDURE — 36415 COLL VENOUS BLD VENIPUNCTURE: CPT

## 2021-02-10 PROCEDURE — 80048 BASIC METABOLIC PNL TOTAL CA: CPT

## 2021-02-10 PROCEDURE — 80076 HEPATIC FUNCTION PANEL: CPT

## 2021-02-10 PROCEDURE — 70450 CT HEAD/BRAIN W/O DYE: CPT

## 2021-02-10 PROCEDURE — 71045 X-RAY EXAM CHEST 1 VIEW: CPT

## 2021-02-10 PROCEDURE — 2580000003 HC RX 258: Performed by: EMERGENCY MEDICINE

## 2021-02-10 RX ORDER — 0.9 % SODIUM CHLORIDE 0.9 %
1000 INTRAVENOUS SOLUTION INTRAVENOUS ONCE
Status: COMPLETED | OUTPATIENT
Start: 2021-02-10 | End: 2021-02-10

## 2021-02-10 RX ADMIN — SODIUM CHLORIDE 1000 ML: 9 INJECTION, SOLUTION INTRAVENOUS at 02:27

## 2021-02-10 RX ADMIN — SODIUM CHLORIDE 1000 ML: 9 INJECTION, SOLUTION INTRAVENOUS at 01:20

## 2021-02-10 ASSESSMENT — ENCOUNTER SYMPTOMS
EYE REDNESS: 0
PHOTOPHOBIA: 0
CONSTIPATION: 0
CHEST TIGHTNESS: 0
DIARRHEA: 0
BLOOD IN STOOL: 0
WHEEZING: 0
EYE ITCHING: 0
SHORTNESS OF BREATH: 0
RECTAL PAIN: 0
ABDOMINAL PAIN: 0
EYE PAIN: 0
APNEA: 0
COUGH: 0
NAUSEA: 0
VOMITING: 0
STRIDOR: 0
EYE DISCHARGE: 0
ANAL BLEEDING: 0
COLOR CHANGE: 0
BACK PAIN: 0
ABDOMINAL DISTENTION: 0
CHOKING: 0

## 2021-02-10 NOTE — ED PROVIDER NOTES
[address] [facility]      Pt Name: Zuleyka Melendez  MRN: [id number]  [place] 1955  Date of evaluation: 2/9/2021  Provider: Tiffanie Francis MD    CHIEF COMPLAINT       Chief Complaint   Patient presents with    Fall     patient in by [facility] ems from home, wheelchair bound, stood up to get in bed, fell, struck head on nightstand, denies LOC, takes ASA. hypotensive, IVF infusing       HISTORY OF PRESENT ILLNESS    Zuleyka Melendez is a 76 y.o. male who presents to the emergency department with fall. Patient states he is wheelchair-bound and stood up to get into vedand fell and hit his head on night stand. Has no complaints now. No LOC. Never happened before. No other associated symptoms. Nursing Notes were reviewed. Including nursing noted for FM, Surgical History, Past Medical History, Social History, vitals, and allergies; agree with all. REVIEW OF SYSTEMS       Review of Systems   Constitutional: Negative for activity change, appetite change, chills, diaphoresis, fatigue, fever and unexpected weight change. HENT: Negative for congestion, dental problem, drooling, ear discharge and ear pain. Eyes: Negative for photophobia, pain, discharge, redness, itching and visual disturbance. Respiratory: Negative for apnea, cough, choking, chest tightness, shortness of breath, wheezing and stridor. Cardiovascular: Negative for chest pain, palpitations and leg swelling. Gastrointestinal: Negative for abdominal distention, abdominal pain, anal bleeding, blood in stool, constipation, diarrhea, nausea, rectal pain and vomiting. Endocrine: Negative for cold intolerance and heat intolerance. Genitourinary: Negative for decreased urine volume and urgency. Musculoskeletal: Negative for arthralgias and back pain. Skin: Negative for color change and pallor. Neurological: Negative for dizziness and facial asymmetry.    Hematological: Negative Disp-30 tablet, R-11Normal      aspirin (ASPIRIN LOW DOSE) 81 MG EC tablet Take 1 tablet by mouth daily, Disp-30 tablet, R-11Normal      buPROPion (WELLBUTRIN SR) 100 MG extended release tablet Take 1 tablet by mouth 2 times daily, Disp-60 tablet, R-11Normal      potassium chloride (KLOR-CON) 10 MEQ extended release tablet Take 1 tablet by mouth daily, Disp-30 tablet, R-11Normal      propranolol (INDERAL LA) 60 MG extended release capsule Take 1 capsule by mouth daily, Disp-30 capsule, R-11Normal      insulin aspart (NOVOLOG FLEXPEN) 100 UNIT/ML injection pen Up to 100units Tid, Disp-15 mL, R-11Normal      primidone (MYSOLINE) 50 MG tablet Take 1 tablet by mouth daily, Disp-30 tablet, R-11Normal      Continuous Blood Gluc Sensor (FREESTYLE MILLIE 14 DAY SENSOR) MISC USE FOR CONTINUOUS GLUCOSE MONITORING. CHANGE SENSOR EVERY 14 DAYS, Disp-2 each, R-5Normal      Transparent Dressings (TEGADERM FILM 4\"X10\") MISC EVERY 14 DAYS Starting Tue 4/28/2020, Disp-100 each, R-3, Normal      Insulin Pen Needle (PEN NEEDLES 5/16\") 30G X 8 MM MISC 5 TIMES DAILY Starting Tue 4/7/2020, Disp-200 each, R-3, Normal      docusate sodium (COLACE) 100 MG capsule TAKE ONE CAPSULE BY MOUTH TWICE A DAY, Disp-180 capsule, R-3Normal      sildenafil (VIAGRA) 50 MG tablet Take 1 tablet by mouth as needed for Erectile Dysfunction, Disp-30 tablet, R-5Print      !! Continuous Blood Gluc  (FREESTYLE MILLIE 14 DAY READER) REEMA 1 Units by Does not apply route as needed (as needed), Disp-1 Device, R-0Print      !! Continuous Blood Gluc  (FREESTYLE MILLIE 14 DAY READER) REEMA 1 Units by Does not apply route as needed (as needed), Disp-1 Device, R-0Print      !!  Continuous Blood Gluc  (FREESTYLE MILLIE 14 DAY READER) REEMA 1 Units by Does not apply route as needed (as needed), Disp-1 Device, R-0Print      Melatonin 10 MG CAPS Take 80 mg by mouth nightly Pt taking 100 mg at bed timeHistorical Med      diphenhydrAMINE (BENADRYL) 25 MG tablet Take 50 mg by mouth every 3-4 hours as needed for Allergies or Sleep Historical Med       !! - Potential duplicate medications found. Please discuss with provider. ALLERGIES     Metoclopramide and No known allergies    FAMILY HISTORY        Family History   Problem Relation Age of Onset   [de-identified] / Stillbirths Mother     Cancer Mother     Heart Disease Father     Diabetes Brother     Diabetes Maternal Uncle     Diabetes Paternal Aunt        SOCIAL HISTORY       Social History     Socioeconomic History    Marital status:      Spouse name: None    Number of children: None    Years of education: None    Highest education level: None   Occupational History    Occupation: disable   Social Needs    Financial resource strain: None    Food insecurity     Worry: None     Inability: None    Transportation needs     Medical: None     Non-medical: None   Tobacco Use    Smoking status: Former Smoker     Packs/day: 2.00     Years: 41.00     Pack years: 82.00     Types: E-Cigarettes     Start date: 7/1/2014     Quit date: 1/1/2018     Years since quitting: 3.1    Smokeless tobacco: Never Used    Tobacco comment: using E-cig with nicotine. Last cigarette 1/1/18. Substance and Sexual Activity    Alcohol use:  Yes    Drug use: No    Sexual activity: Not Currently     Partners: Female   Lifestyle    Physical activity     Days per week: None     Minutes per session: None    Stress: None   Relationships    Social connections     Talks on phone: None     Gets together: None     Attends Restoration service: None     Active member of club or organization: None     Attends meetings of clubs or organizations: None     Relationship status: None    Intimate partner violence     Fear of current or ex partner: None     Emotionally abused: None     Physically abused: None     Forced sexual activity: None   Other Topics Concern    None   Social History Narrative    None       PHYSICAL EXAM are visualized and preliminarily interpreted by the emergency physician with the below findings:    Impression   Mild atrophy and mild chronic microischemic changes scattered in the deep   white matter with no acute abnormality seen.       Slightly small right globe which is deformed and displaced laterally with   questionable mild preseptal thickening anteriorly.  This could very due to   remote trauma but is otherwise of uncertain etiology and significance. Recommend ophthalmology follow-up. Fake right eye      ED BEDSIDE ULTRASOUND:   Performed by ED Physician - none    LABS:  Labs Reviewed   CBC WITH AUTO DIFFERENTIAL - Abnormal; Notable for the following components:       Result Value    RBC 3.81 (*)     Hemoglobin 11.7 (*)     Hematocrit 36.0 (*)     All other components within normal limits    Narrative:     Performed at:  [address] [employer]   Phone [phone]   BASIC METABOLIC PANEL W/ REFLEX TO MG FOR LOW K - Abnormal; Notable for the following components:    Sodium 130 (*)     Chloride 97 (*)     Glucose 309 (*)     BUN 22 (*)     CREATININE 0.7 (*)     All other components within normal limits    Narrative:     Performed at:  [address] [employer]   Phone [phone]   HEPATIC FUNCTION PANEL - Abnormal; Notable for the following components: Total Protein 5.1 (*)     Albumin 2.8 (*)     ALT 6 (*)     AST 12 (*)     All other components within normal limits    Narrative:     Performed at:  [address] [facility] [employer]   Phone [phone]       All other labs were withinnormal range or not returned as of this dictation.     EMERGENCY DEPARTMENT COURSE and DIFFERENTIAL DIAGNOSIS/MDM:     PMH, Surgical Hx, FH, Social Hx reviewed by myself (ETOH usage, Tobacco usage, Drug usage reviewed by myself, no pertinent Hx)- No Pertinent Hx     Old records were reviewed by me\    Blood pressure normalized with fluids. Has no symptoms     I estimate there is LOW risk for Sepsis, MI, Stroke, Tamponade, PTX, Toxicity or other life threatening etiology thus I consider the discharge disposition reasonable. The patient is at low risk for mortality based on demographic, history and clinical factors. Given the best available information and clinical assessment, I estimate the risk of hospitalization to be greater than risk of treatment at home. I have explained to the patient that the risk could rapidly change, given precautions for return and instructions. Explained to patient that the risk for mortality is low based on demographic, history and clinical factors. I discussed with patient the results of evaluation in the ED, diagnosis, care, and prognosis. The plan is to discharge to home. Patient is in agreement with plan and questions have been answered. I also discussed with patient the reasons which may require a return visit and the importance of follow-up care. The patient is well-appearing, nontoxic, and improved at the time of discharge. Patient agrees to call to arrange follow-up care as directed. Patient understands to return immediately for worsening/change in symptoms. CRITICAL CARE TIME   Total Critical Caretime was 21 minutes, excluding separately reportable procedures. There was a high probability of clinically significant/life threatening deterioration in the patient's condition which required my urgent intervention. PROCEDURES:  Unlessotherwise noted below, none    FINAL IMPRESSION      1.  Injury of head, initial encounter          DISPOSITION/PLAN   DISPOSITION Decision To Discharge 02/10/2021 04:07:38 AM    PATIENT REFERRED TO:  YULIA Caba - CNP  [address] [place] [address]  [phone]            DISCHARGE MEDICATIONS:  Discharge Medication List as of

## 2021-02-10 NOTE — ED NOTES
Patient presents to ER via [facility] from home, wheelchair bound, alert and oriented, states he got up to stand and pivot from wheelchair into bed in the dark and fell, struck head on nightstand. Denies LOC or pain, does complain of pain to bottom from current position, hypotensive for EMS, 1L/NS bolus infusing, patient with positive response, alert and oriented.      Carrie Jaeger RN  02/09/21 6537

## 2021-02-10 NOTE — ED NOTES
MD aware of BP, verbal order for 2nd L of NS  Infusing at this time     [facility], RN  02/10/21 6429

## 2021-02-11 ENCOUNTER — CARE COORDINATION (OUTPATIENT)
Dept: CARE COORDINATION | Age: 66
End: 2021-02-11

## 2021-02-11 NOTE — CARE COORDINATION
Chart reviewed. Pt seen and evaluated in ED for Injury of head. Pt given the following referrals/recommendations (see below):    - Follow up with YULIA Cardenas - CNP (Family Nurse Practitioner)    Initial ED f/u call to pt attempted. Unable to reach pt or leave message due to phone continually ringing. Will plan second ED f/u and discuss ACM at time of second outreach tomorrow, if able to reach pt. FU appts/Provider:    No future appointments.

## 2021-02-12 NOTE — CARE COORDINATION
Second ED f/u call attempted. Unable to reach pt or leave message due to phone continually ringing. No further outreach at this time. FU appts/Provider:    No future appointments.

## 2021-03-26 RX ORDER — BISACODYL 5 MG
TABLET, DELAYED RELEASE (ENTERIC COATED) ORAL
Qty: 30 TABLET | Refills: 0 | Status: SHIPPED | OUTPATIENT
Start: 2021-03-26 | End: 2021-05-26

## 2021-03-26 RX ORDER — DOCUSATE SODIUM 100 MG/1
CAPSULE, LIQUID FILLED ORAL
Qty: 60 CAPSULE | Refills: 0 | Status: SHIPPED | OUTPATIENT
Start: 2021-03-26 | End: 2021-05-25

## 2021-05-01 DIAGNOSIS — E11.9 TYPE 2 DIABETES MELLITUS WITHOUT COMPLICATION, WITH LONG-TERM CURRENT USE OF INSULIN (HCC): ICD-10-CM

## 2021-05-01 DIAGNOSIS — Z79.4 TYPE 2 DIABETES MELLITUS WITHOUT COMPLICATION, WITH LONG-TERM CURRENT USE OF INSULIN (HCC): ICD-10-CM

## 2021-05-03 RX ORDER — INSULIN GLARGINE 100 [IU]/ML
INJECTION, SOLUTION SUBCUTANEOUS
Qty: 30 ML | Refills: 0 | Status: SHIPPED | OUTPATIENT
Start: 2021-05-03 | End: 2022-09-09 | Stop reason: SDUPTHER

## 2021-05-25 ENCOUNTER — APPOINTMENT (OUTPATIENT)
Dept: CT IMAGING | Age: 66
End: 2021-05-25
Payer: COMMERCIAL

## 2021-05-25 ENCOUNTER — HOSPITAL ENCOUNTER (EMERGENCY)
Age: 66
Discharge: HOME | End: 2021-05-25
Attending: EMERGENCY MEDICINE
Payer: COMMERCIAL

## 2021-05-25 VITALS
OXYGEN SATURATION: 98 % | RESPIRATION RATE: 16 BRPM | HEART RATE: 70 BPM | SYSTOLIC BLOOD PRESSURE: 132 MMHG | DIASTOLIC BLOOD PRESSURE: 52 MMHG | TEMPERATURE: 99.2 F

## 2021-05-25 DIAGNOSIS — K52.9 COLITIS: Primary | ICD-10-CM

## 2021-05-25 LAB
A/G RATIO: 1.2 (ref 1.1–2.2)
ALBUMIN SERPL-MCNC: 3.8 G/DL (ref 3.4–5)
ALP BLD-CCNC: 137 U/L (ref 40–129)
ALT SERPL-CCNC: 13 U/L (ref 10–40)
ANION GAP SERPL CALCULATED.3IONS-SCNC: 12 MMOL/L (ref 3–16)
AST SERPL-CCNC: 20 U/L (ref 15–37)
BASOPHILS ABSOLUTE: 0 K/UL (ref 0–0.2)
BASOPHILS RELATIVE PERCENT: 0.4 %
BILIRUB SERPL-MCNC: 0.3 MG/DL (ref 0–1)
BUN BLDV-MCNC: 19 MG/DL (ref 7–20)
CALCIUM SERPL-MCNC: 9.1 MG/DL (ref 8.3–10.6)
CHLORIDE BLD-SCNC: 98 MMOL/L (ref 99–110)
CO2: 22 MMOL/L (ref 21–32)
CREAT SERPL-MCNC: 0.6 MG/DL (ref 0.8–1.3)
EOSINOPHILS ABSOLUTE: 0.1 K/UL (ref 0–0.6)
EOSINOPHILS RELATIVE PERCENT: 0.7 %
GFR AFRICAN AMERICAN: >60
GFR NON-AFRICAN AMERICAN: >60
GLOBULIN: 3.2 G/DL
GLUCOSE BLD-MCNC: 182 MG/DL (ref 70–99)
HCT VFR BLD CALC: 44.9 % (ref 40.5–52.5)
HEMOGLOBIN: 15.2 G/DL (ref 13.5–17.5)
LIPASE: 17 U/L (ref 13–60)
LYMPHOCYTES ABSOLUTE: 2.3 K/UL (ref 1–5.1)
LYMPHOCYTES RELATIVE PERCENT: 19.7 %
MCH RBC QN AUTO: 31.9 PG (ref 26–34)
MCHC RBC AUTO-ENTMCNC: 34 G/DL (ref 31–36)
MCV RBC AUTO: 93.8 FL (ref 80–100)
MONOCYTES ABSOLUTE: 0.8 K/UL (ref 0–1.3)
MONOCYTES RELATIVE PERCENT: 7.3 %
NEUTROPHILS ABSOLUTE: 8.2 K/UL (ref 1.7–7.7)
NEUTROPHILS RELATIVE PERCENT: 71.9 %
OCCULT BLOOD DIAGNOSTIC: ABNORMAL
PDW BLD-RTO: 13.6 % (ref 12.4–15.4)
PLATELET # BLD: 181 K/UL (ref 135–450)
PMV BLD AUTO: 8.6 FL (ref 5–10.5)
POTASSIUM REFLEX MAGNESIUM: 4.6 MMOL/L (ref 3.5–5.1)
RBC # BLD: 4.79 M/UL (ref 4.2–5.9)
SODIUM BLD-SCNC: 132 MMOL/L (ref 136–145)
TOTAL PROTEIN: 7 G/DL (ref 6.4–8.2)
WBC # BLD: 11.4 K/UL (ref 4–11)

## 2021-05-25 PROCEDURE — 6360000004 HC RX CONTRAST MEDICATION: Performed by: EMERGENCY MEDICINE

## 2021-05-25 PROCEDURE — 83690 ASSAY OF LIPASE: CPT

## 2021-05-25 PROCEDURE — 85025 COMPLETE CBC W/AUTO DIFF WBC: CPT

## 2021-05-25 PROCEDURE — 6370000000 HC RX 637 (ALT 250 FOR IP): Performed by: EMERGENCY MEDICINE

## 2021-05-25 PROCEDURE — 99284 EMERGENCY DEPT VISIT MOD MDM: CPT

## 2021-05-25 PROCEDURE — G0328 FECAL BLOOD SCRN IMMUNOASSAY: HCPCS

## 2021-05-25 PROCEDURE — 74177 CT ABD & PELVIS W/CONTRAST: CPT

## 2021-05-25 PROCEDURE — 80053 COMPREHEN METABOLIC PANEL: CPT

## 2021-05-25 PROCEDURE — 36415 COLL VENOUS BLD VENIPUNCTURE: CPT

## 2021-05-25 RX ORDER — DOCUSATE SODIUM 100 MG/1
100 CAPSULE, LIQUID FILLED ORAL 2 TIMES DAILY
Qty: 20 CAPSULE | Refills: 0 | Status: SHIPPED | OUTPATIENT
Start: 2021-05-25 | End: 2021-06-01

## 2021-05-25 RX ORDER — CIPROFLOXACIN 500 MG/1
500 TABLET, FILM COATED ORAL ONCE
Status: COMPLETED | OUTPATIENT
Start: 2021-05-25 | End: 2021-05-25

## 2021-05-25 RX ORDER — METRONIDAZOLE 500 MG/1
500 TABLET ORAL 2 TIMES DAILY
Qty: 30 TABLET | Refills: 0 | Status: SHIPPED | OUTPATIENT
Start: 2021-05-25 | End: 2021-06-04

## 2021-05-25 RX ORDER — METRONIDAZOLE 500 MG/1
500 TABLET ORAL ONCE
Status: COMPLETED | OUTPATIENT
Start: 2021-05-25 | End: 2021-05-25

## 2021-05-25 RX ORDER — CIPROFLOXACIN 500 MG/1
500 TABLET, FILM COATED ORAL 2 TIMES DAILY
Qty: 20 TABLET | Refills: 0 | Status: SHIPPED | OUTPATIENT
Start: 2021-05-25 | End: 2021-06-04

## 2021-05-25 RX ADMIN — IOPAMIDOL 75 ML: 755 INJECTION, SOLUTION INTRAVENOUS at 05:56

## 2021-05-25 RX ADMIN — METRONIDAZOLE 500 MG: 500 TABLET ORAL at 07:16

## 2021-05-25 RX ADMIN — CIPROFLOXACIN 500 MG: 500 TABLET, FILM COATED ORAL at 07:16

## 2021-05-25 ASSESSMENT — PAIN DESCRIPTION - LOCATION: LOCATION: ABDOMEN

## 2021-05-25 ASSESSMENT — ENCOUNTER SYMPTOMS
WHEEZING: 0
ABDOMINAL DISTENTION: 0
SHORTNESS OF BREATH: 0
DIARRHEA: 0
ANAL BLEEDING: 0
CHOKING: 0
BACK PAIN: 0
EYE DISCHARGE: 0
COLOR CHANGE: 0
NAUSEA: 0
EYE ITCHING: 0
RECTAL PAIN: 0
CONSTIPATION: 1
EYE PAIN: 0
COUGH: 0
EYE REDNESS: 0
VOMITING: 0
BLOOD IN STOOL: 1
PHOTOPHOBIA: 0
STRIDOR: 0
APNEA: 0
CHEST TIGHTNESS: 0
ABDOMINAL PAIN: 1

## 2021-05-25 ASSESSMENT — PAIN DESCRIPTION - FREQUENCY: FREQUENCY: INTERMITTENT

## 2021-05-25 ASSESSMENT — PAIN DESCRIPTION - DESCRIPTORS: DESCRIPTORS: ACHING

## 2021-05-25 ASSESSMENT — PAIN DESCRIPTION - ONSET: ONSET: ON-GOING

## 2021-05-25 ASSESSMENT — PAIN SCALES - GENERAL: PAINLEVEL_OUTOF10: 2

## 2021-05-25 NOTE — ED PROVIDER NOTES
[address] [facility]      Pt Name: Jeovany Garza  MRN: [id number]  [place] 1955  Date of evaluation: 5/25/2021  Provider: Paulina Bill MD    CHIEF COMPLAINT       Chief Complaint   Patient presents with    Abdominal Pain     Pt in via EMS with intermittent \"right lower abdominal pain\". Pt states that it started last night and that he has had \"two episodes of rectal bleeding\". Pt alert and oriented on arrival to ED with no signs of distress noted at this time. Pt rates pain as a 2/10.  Rectal Bleeding       HISTORY OF PRESENT ILLNESS    Jeovany Garza is a 76 y.o. male who presents to the emergency department with abdominal pain. Patient endorses right lower quadrant abdominal pain for the last 24 hours. States 2 times today has noticed a little bit of blood in the toilet and on wiping. Pain currently is 3 out of 10 achy in nature. No nausea or vomiting. Positive for constipation. No other associated symptoms. Nursing Notes were reviewed. Including nursing noted for FM, Surgical History, Past Medical History, Social History, vitals, and allergies; agree with all. REVIEW OF SYSTEMS       Review of Systems   Constitutional: Negative for activity change, appetite change, chills, diaphoresis, fatigue, fever and unexpected weight change. HENT: Negative for congestion, dental problem, drooling, ear discharge and ear pain. Eyes: Negative for photophobia, pain, discharge, redness, itching and visual disturbance. Respiratory: Negative for apnea, cough, choking, chest tightness, shortness of breath, wheezing and stridor. Cardiovascular: Negative for chest pain, palpitations and leg swelling. Gastrointestinal: Positive for abdominal pain, blood in stool and constipation. Negative for abdominal distention, anal bleeding, diarrhea, nausea, rectal pain and vomiting. Endocrine: Negative for cold intolerance and heat intolerance. Genitourinary: Negative for decreased urine volume and urgency. Musculoskeletal: Negative for arthralgias and back pain. Skin: Negative for color change and pallor. Neurological: Negative for dizziness and facial asymmetry. Hematological: Negative for adenopathy. Does not bruise/bleed easily. Psychiatric/Behavioral: Negative for agitation, behavioral problems, confusion and decreased concentration. Except as noted above the remainder of the review of systems was reviewed and negative.      PAST MEDICAL HISTORY     Past Medical History:   Diagnosis Date    Allergic rhinitis     arthri     Arthritis     lumbar spine    CAD (coronary artery disease)     Chronic kidney disease     retention, bph    Constipation     DM [facility]diabetes mellitus) [facility] [address]     Generalized pain     GERD (gastroesophageal reflux disease)     constipation    Heart attack ([facility] [address]     Heart disease     Hyperlipidemia     Movement disorder     lumbar spine arthritis    Psychiatric problem     ptsd, anxiety, depression    Urinary retention        SURGICAL HISTORY       Past Surgical History:   Procedure Laterality Date    BACK SURGERY      lamenectomy 1996    CARDIAC SURGERY      stents 2006 & 2007    CORONARY ANGIOPLASTY WITH STENT PLACEMENT  2007    SKIN FULL GRAFT  1980    bilateral legs       CURRENT MEDICATIONS       Previous Medications    ASPIRIN (ASPIRIN LOW DOSE) 81 MG EC TABLET    Take 1 tablet by mouth daily    WILBERTAGLAR KWIKPEN 100 UNIT/ML INJECTION PEN    INJECT 30 UNITS SUB-Q TWICE DAILY    BISACODYL 5 MG EC TABLET    TAKE ONE TABLET BY MOUTH EVERY DAY AS NEEDED FOR CONSTIPATION    BUPROPION (WELLBUTRIN SR) 100 MG EXTENDED RELEASE TABLET    Take 1 tablet by mouth 2 times daily    CONTINUOUS BLOOD GLUC  (FREESTYLE MILLIE 14 DAY READER) REEMA    1 Units by Does not apply route as needed (as needed)    CONTINUOUS BLOOD GLUC  (FREESTYLE MILLIE 14 DAY READER) REEMA    1 Units by Does not apply route as Social History     Socioeconomic History    Marital status:      Spouse name: None    Number of children: None    Years of education: None    Highest education level: None   Occupational History    Occupation: disable   Tobacco Use    Smoking status: Former Smoker     Packs/day: 2.00     Years: 41.00     Pack years: 82.00     Types: E-Cigarettes     Start date: 7/1/2014     Quit date: 1/1/2018     Years since quitting: 3.3    Smokeless tobacco: Never Used    Tobacco comment: using E-cig with nicotine. Last cigarette 1/1/18. Vaping Use    Vaping Use: Every day    Substances: Always   Substance and Sexual Activity    Alcohol use: Yes    Drug use: No    Sexual activity: Not Currently     Partners: Female   Other Topics Concern    None   Social History Narrative    None     Social Determinants of Health     Financial Resource Strain:     Difficulty of Paying Living Expenses:    Food Insecurity:     Worried About Running Out of Food in the Last Year:     Ran Out of Food in the Last Year:    Transportation Needs:     Lack of Transportation (Medical):  Lack of Transportation (Non-Medical):    Physical Activity:     Days of Exercise per Week:     Minutes of Exercise per Session:    Stress:     Feeling of Stress :    Social Connections:     Frequency of Communication with Friends and Family:     Frequency of Social Gatherings with Friends and Family:     Attends Gnosticism Services:     Active Member of Clubs or Organizations:     Attends Club or Organization Meetings:     Marital Status:    Intimate Partner Violence:     Fear of Current or Ex-Partner:     Emotionally Abused:     Physically Abused:     Sexually Abused:        PHYSICAL EXAM       ED Triage Vitals [05/25/21 0500]   BP Temp Temp Source Pulse Resp SpO2 Height Weight   (!) 151/64 99.2 °F (37.3 °C) Oral 84 15 98 % -- --       Physical Exam  Vitals and nursing note reviewed.    Constitutional:       General: He is Physician - none    LABS:  Labs Reviewed   CBC WITH AUTO DIFFERENTIAL - Abnormal; Notable for the following components:       Result Value    WBC 11.4 (*)     Neutrophils Absolute 8.2 (*)     All other components within normal limits    Narrative:     Performed at:  [address] [facility]   Phone [phone]   COMPREHENSIVE METABOLIC PANEL W/ REFLEX TO MG FOR LOW K - Abnormal; Notable for the following components:    Sodium 132 (*)     Chloride 98 (*)     Glucose 182 (*)     CREATININE 0.6 (*)     Alkaline Phosphatase 137 (*)     All other components within normal limits    Narrative:     Performed at:  [place] [address]   Phone [phone]   BLOOD OCCULT STOOL DIAGNOSTIC - Abnormal; Notable for the following components:    Occult Blood Diagnostic   (*)     Value: Result: POSITIVE  Normal range: Negative      All other components within normal limits    Narrative:     ORDER#: [id number]                          ORDERED BY: Brian Gómez  SOURCE: Stool                              COLLECTED:  05/25/21 05:20  ANTIBIOTICS AT FADI.:                      RECEIVED :  05/25/21 05:24  Performed at:  [facility]  [address] [place] [address]   Phone [phone]   LIPASE    Narrative:     Performed at:  [facility]  [address] [place] [address]   Phone [phone]       All other labs were withinnormal range or not returned as of this dictation. EMERGENCY DEPARTMENT COURSE and DIFFERENTIAL DIAGNOSIS/MDM:     PMH, Surgical Hx, FH, Social Hx reviewed by myself (ETOH usage, Tobacco usage, Drug usage reviewed by myself, no pertinent Hx)- No Pertinent Hx     Old records were reviewed by me    CT concerning for colitis. Patient well-appearing nontoxic. Hemoglobin stable.   Hemoccult was positive. Patient does have some constipation. Stool softeners provided. Cipro and Flagyl. GI follow-up given. L1 age-indeterminate fracture incidental.  Patient has no point tenderness to palpation. Has got no cord compression symptoms such as weakness or saddle numbness or loss of bowel or bladder function. Discussed close PCP follow-up. I estimate there is LOW risk for Sepsis, MI, Stroke, Tamponade, PTX, Toxicity or other life threatening etiology thus I consider the discharge disposition reasonable. The patient is at low risk for mortality based on demographic, history and clinical factors. Given the best available information and clinical assessment, I estimate the risk of hospitalization to be greater than risk of treatment at home. I have explained to the patient that the risk could rapidly change, given precautions for return and instructions. Explained to patient that the risk for mortality is low based on demographic, history and clinical factors. I discussed with patient the results of evaluation in the ED, diagnosis, care, and prognosis. The plan is to discharge to home. Patient is in agreement with plan and questions have been answered. I also discussed with patient the reasons which may require a return visit and the importance of follow-up care. The patient is well-appearing, nontoxic, and improved at the time of discharge. Patient agrees to call to arrange follow-up care as directed. Patient understands to return immediately for worsening/change in symptoms. CRITICAL CARE TIME   Total Critical Caretime was 21 minutes, excluding separately reportable procedures. There was a high probability of clinically significant/life threatening deterioration in the patient's condition which required my urgent intervention. PROCEDURES:  Unlessotherwise noted below, none    FINAL IMPRESSION      1.  Colitis          DISPOSITION/PLAN   DISPOSITION      PATIENT REFERRED TO:  Elizabeth Tello MD  [address]  [address]  [facility]  [phone]            DISCHARGE MEDICATIONS:  Discharge Medication List as of 5/25/2021  7:19 AM      START taking these medications    Details   ciprofloxacin (CIPRO) 500 MG tablet Take 1 tablet by mouth 2 times daily for 10 days, Disp-20 tablet, R-0Print      metroNIDAZOLE (FLAGYL) 500 MG tablet Take 1 tablet by mouth 2 times daily for 10 days, Disp-30 tablet, R-0Print                (Please note that portions ofthis note were completed with a voice recognition program.  Efforts were made to edit the dictations but occasionally words are mis-transcribed.)    Nathalie Lopez MD(electronically signed)  Attending Emergency Physician          Nathalie Lopez MD  05/26/21 1188

## 2021-05-25 NOTE — ED NOTES
Handoff report given to SEBASTIÁN Ro to assume care. Denies further questions.      Hilbert Saint, RN  05/25/21 1603

## 2021-05-25 NOTE — ED NOTES
Bed: B-08  Expected date: 5/25/21  Expected time: 4:46 AM  Means of arrival: [facility]  Comments:  16E Abd peter Carney RN  05/25/21 0752

## 2021-05-26 ENCOUNTER — CARE COORDINATION (OUTPATIENT)
Dept: INTERNAL MEDICINE CLINIC | Age: 66
End: 2021-05-26

## 2021-05-26 RX ORDER — BISACODYL 5 MG
TABLET, DELAYED RELEASE (ENTERIC COATED) ORAL
Qty: 30 TABLET | Refills: 0 | Status: SHIPPED | OUTPATIENT
Start: 2021-05-26 | End: 2022-03-08

## 2021-05-26 RX ORDER — DOCUSATE SODIUM 100 MG/1
CAPSULE, LIQUID FILLED ORAL
Qty: 60 CAPSULE | Refills: 0 | OUTPATIENT
Start: 2021-05-26

## 2021-05-26 NOTE — CARE COORDINATION
[facility] Transitions Initial Follow Up Call  Patient returned this [facility] call    Call within 2 business days of discharge: Yes     Patient: Heidi Garzon Patient : 1955 MRN: [id number]    Last Discharge [facility]       Complaint Diagnosis Description Type Department Provider    21 Abdominal Pain; Rectal Bleeding Colitis ED (DISCHARGE) [facility] Chandana Taylor MD          RARS: No data recorded     Spoke with: patient      Discharge department/facility: [facility]    Non-face-to-face services provided:  Obtained and reviewed discharge summary and/or continuity of care documents  Reviewed and followed up on pending diagnostic tests and treatments-ct ab  Education of patient/family/caregiver/guardian to support self-management-follow up w GI, my chart, scheduling vv as pt reports he is not feeling well enought o go out of the house now  Assessment and support for treatment adherence and medication management-taking antbxs as prescribed, will NOt schedule w GI today per pt. he asked to be left alone and he stated he appreciated the call, but is going to lay down now, he does not want further calls , he stated he would follow up w GI    Follow Up  No future appointments.     Josesito Beard RN     Discussed ACM ,pt declined

## 2021-06-01 RX ORDER — DOCUSATE SODIUM 100 MG/1
CAPSULE, LIQUID FILLED ORAL
Qty: 60 CAPSULE | Refills: 0 | Status: SHIPPED | OUTPATIENT
Start: 2021-06-01 | End: 2022-03-08

## 2021-07-22 DIAGNOSIS — E11.65 UNCONTROLLED TYPE 2 DIABETES MELLITUS WITH HYPERGLYCEMIA (HCC): ICD-10-CM

## 2021-07-22 RX ORDER — FLASH GLUCOSE SENSOR
KIT MISCELLANEOUS
Refills: 0 | OUTPATIENT
Start: 2021-07-22

## 2021-07-23 DIAGNOSIS — E11.65 UNCONTROLLED TYPE 2 DIABETES MELLITUS WITH HYPERGLYCEMIA (HCC): ICD-10-CM

## 2021-07-26 ENCOUNTER — TELEPHONE (OUTPATIENT)
Dept: ENDOCRINOLOGY | Age: 66
End: 2021-07-26

## 2021-07-26 DIAGNOSIS — E11.65 UNCONTROLLED TYPE 2 DIABETES MELLITUS WITH HYPERGLYCEMIA (HCC): ICD-10-CM

## 2021-07-26 RX ORDER — FLASH GLUCOSE SENSOR
KIT MISCELLANEOUS
Refills: 0 | OUTPATIENT
Start: 2021-07-26

## 2021-07-26 RX ORDER — FLASH GLUCOSE SENSOR
KIT MISCELLANEOUS
Qty: 2 EACH | Refills: 11 | Status: SHIPPED | OUTPATIENT
Start: 2021-07-26 | End: 2022-09-09 | Stop reason: SDUPTHER

## 2021-07-26 NOTE — TELEPHONE ENCOUNTER
Patient needs a refill of his Continuous Blood Gluc Sensor (FREESTYLE MILLIE 14 DAY SENSOR) MISC he is currently out. He would like a call back when it's sent to the pharmacy.           [address] [facility], [address] [address] [address]   Phone:  [phone]  Fax:  [phone]

## 2021-08-04 ENCOUNTER — TELEPHONE (OUTPATIENT)
Dept: INTERNAL MEDICINE CLINIC | Age: 66
End: 2021-08-04

## 2021-08-04 DIAGNOSIS — E11.9 TYPE 2 DIABETES MELLITUS WITHOUT COMPLICATION, WITH LONG-TERM CURRENT USE OF INSULIN (HCC): Primary | ICD-10-CM

## 2021-08-04 DIAGNOSIS — Z79.4 TYPE 2 DIABETES MELLITUS WITHOUT COMPLICATION, WITH LONG-TERM CURRENT USE OF INSULIN (HCC): Primary | ICD-10-CM

## 2021-08-04 NOTE — TELEPHONE ENCOUNTER
[place] from [employer] called to request a new script for diabetic [employer] program. This has to be renewed yearly. Aware Ruben Calderon out of the office until next Monday. Please fax to [phone].

## 2022-03-02 DIAGNOSIS — E11.9 TYPE 2 DIABETES MELLITUS WITHOUT COMPLICATION, WITH LONG-TERM CURRENT USE OF INSULIN (HCC): ICD-10-CM

## 2022-03-02 DIAGNOSIS — E78.2 MIXED HYPERLIPIDEMIA: ICD-10-CM

## 2022-03-02 DIAGNOSIS — R35.0 BENIGN PROSTATIC HYPERPLASIA WITH URINARY FREQUENCY: ICD-10-CM

## 2022-03-02 DIAGNOSIS — G25.2 ACTION TREMOR: ICD-10-CM

## 2022-03-02 DIAGNOSIS — F33.9 RECURRENT MAJOR DEPRESSIVE EPISODES (HCC): ICD-10-CM

## 2022-03-02 DIAGNOSIS — I10 ESSENTIAL HYPERTENSION: ICD-10-CM

## 2022-03-02 DIAGNOSIS — N40.1 BENIGN PROSTATIC HYPERPLASIA WITH URINARY FREQUENCY: ICD-10-CM

## 2022-03-02 DIAGNOSIS — F43.10 POSTTRAUMATIC STRESS DISORDER: ICD-10-CM

## 2022-03-02 DIAGNOSIS — Z79.4 TYPE 2 DIABETES MELLITUS WITHOUT COMPLICATION, WITH LONG-TERM CURRENT USE OF INSULIN (HCC): ICD-10-CM

## 2022-03-02 DIAGNOSIS — F31.32 BIPOLAR AFFECTIVE DISORDER, CURRENTLY DEPRESSED, MODERATE (HCC): ICD-10-CM

## 2022-03-02 RX ORDER — QUETIAPINE FUMARATE 400 MG/1
TABLET, FILM COATED ORAL
Qty: 30 TABLET | Refills: 0 | OUTPATIENT
Start: 2022-03-02

## 2022-03-02 RX ORDER — POTASSIUM CHLORIDE 750 MG/1
TABLET, FILM COATED, EXTENDED RELEASE ORAL
Qty: 30 TABLET | Refills: 0 | OUTPATIENT
Start: 2022-03-02

## 2022-03-02 RX ORDER — TAMSULOSIN HYDROCHLORIDE 0.4 MG/1
CAPSULE ORAL
Qty: 30 CAPSULE | Refills: 0 | OUTPATIENT
Start: 2022-03-02

## 2022-03-02 RX ORDER — BUPROPION HYDROCHLORIDE 100 MG/1
TABLET, EXTENDED RELEASE ORAL
Qty: 60 TABLET | Refills: 0 | OUTPATIENT
Start: 2022-03-02

## 2022-03-02 RX ORDER — PROPRANOLOL HCL 60 MG
CAPSULE, EXTENDED RELEASE 24HR ORAL
Qty: 30 CAPSULE | Refills: 0 | OUTPATIENT
Start: 2022-03-02

## 2022-03-02 RX ORDER — SIMVASTATIN 40 MG
TABLET ORAL
Qty: 30 TABLET | Refills: 0 | OUTPATIENT
Start: 2022-03-02

## 2022-03-02 RX ORDER — PRIMIDONE 50 MG/1
50 TABLET ORAL DAILY
Qty: 30 TABLET | Refills: 0 | OUTPATIENT
Start: 2022-03-02

## 2022-03-02 RX ORDER — METFORMIN HYDROCHLORIDE 500 MG/1
TABLET, EXTENDED RELEASE ORAL
Qty: 120 TABLET | Refills: 0 | OUTPATIENT
Start: 2022-03-02

## 2022-03-04 RX ORDER — BUPROPION HYDROCHLORIDE 100 MG/1
100 TABLET, EXTENDED RELEASE ORAL 2 TIMES DAILY
Qty: 60 TABLET | Refills: 11 | Status: SHIPPED | OUTPATIENT
Start: 2022-03-04

## 2022-03-04 RX ORDER — SIMVASTATIN 40 MG
40 TABLET ORAL NIGHTLY
Qty: 30 TABLET | Refills: 11 | Status: SHIPPED | OUTPATIENT
Start: 2022-03-04

## 2022-03-04 RX ORDER — PROPRANOLOL HCL 60 MG
60 CAPSULE, EXTENDED RELEASE 24HR ORAL DAILY
Qty: 30 CAPSULE | Refills: 11 | Status: SHIPPED | OUTPATIENT
Start: 2022-03-04

## 2022-03-04 RX ORDER — LISINOPRIL 10 MG/1
10 TABLET ORAL DAILY
Qty: 30 TABLET | Refills: 11 | Status: SHIPPED | OUTPATIENT
Start: 2022-03-04 | End: 2022-03-08

## 2022-03-04 RX ORDER — POTASSIUM CHLORIDE 750 MG/1
10 TABLET, FILM COATED, EXTENDED RELEASE ORAL DAILY
Qty: 30 TABLET | Refills: 11 | Status: SHIPPED | OUTPATIENT
Start: 2022-03-04

## 2022-03-04 RX ORDER — TAMSULOSIN HYDROCHLORIDE 0.4 MG/1
0.4 CAPSULE ORAL DAILY
Qty: 30 CAPSULE | Refills: 11 | Status: SHIPPED | OUTPATIENT
Start: 2022-03-04

## 2022-03-04 RX ORDER — QUETIAPINE FUMARATE 400 MG/1
TABLET, FILM COATED ORAL
Qty: 30 TABLET | Refills: 11 | Status: SHIPPED | OUTPATIENT
Start: 2022-03-04

## 2022-03-04 RX ORDER — PRIMIDONE 50 MG/1
50 TABLET ORAL DAILY
Qty: 30 TABLET | Refills: 11 | Status: SHIPPED | OUTPATIENT
Start: 2022-03-04

## 2022-03-04 RX ORDER — METFORMIN HYDROCHLORIDE 500 MG/1
2000 TABLET, EXTENDED RELEASE ORAL
Qty: 120 TABLET | Refills: 11 | Status: SHIPPED | OUTPATIENT
Start: 2022-03-04

## 2022-03-08 ENCOUNTER — OFFICE VISIT (OUTPATIENT)
Dept: INTERNAL MEDICINE CLINIC | Age: 67
End: 2022-03-08
Payer: COMMERCIAL

## 2022-03-08 VITALS
SYSTOLIC BLOOD PRESSURE: 122 MMHG | HEART RATE: 80 BPM | HEIGHT: 71 IN | DIASTOLIC BLOOD PRESSURE: 68 MMHG | BODY MASS INDEX: 23.52 KG/M2 | WEIGHT: 168 LBS | OXYGEN SATURATION: 98 %

## 2022-03-08 DIAGNOSIS — F31.32 BIPOLAR AFFECTIVE DISORDER, CURRENTLY DEPRESSED, MODERATE (HCC): ICD-10-CM

## 2022-03-08 DIAGNOSIS — E55.9 VITAMIN D DEFICIENCY: ICD-10-CM

## 2022-03-08 DIAGNOSIS — I10 ESSENTIAL HYPERTENSION: ICD-10-CM

## 2022-03-08 DIAGNOSIS — N40.1 BENIGN PROSTATIC HYPERPLASIA WITH URINARY FREQUENCY: ICD-10-CM

## 2022-03-08 DIAGNOSIS — J43.2 CENTRILOBULAR EMPHYSEMA (HCC): ICD-10-CM

## 2022-03-08 DIAGNOSIS — E78.2 MIXED HYPERLIPIDEMIA: ICD-10-CM

## 2022-03-08 DIAGNOSIS — E11.9 TYPE 2 DIABETES MELLITUS WITHOUT COMPLICATION, WITH LONG-TERM CURRENT USE OF INSULIN (HCC): ICD-10-CM

## 2022-03-08 DIAGNOSIS — Z12.5 PROSTATE CANCER SCREENING: ICD-10-CM

## 2022-03-08 DIAGNOSIS — R35.0 BENIGN PROSTATIC HYPERPLASIA WITH URINARY FREQUENCY: ICD-10-CM

## 2022-03-08 DIAGNOSIS — Z79.4 TYPE 2 DIABETES MELLITUS WITHOUT COMPLICATION, WITH LONG-TERM CURRENT USE OF INSULIN (HCC): ICD-10-CM

## 2022-03-08 DIAGNOSIS — I10 ESSENTIAL HYPERTENSION: Primary | ICD-10-CM

## 2022-03-08 DIAGNOSIS — Z87.891 PERSONAL HISTORY OF TOBACCO USE: ICD-10-CM

## 2022-03-08 PROBLEM — E11.22 TYPE 2 DIABETES MELLITUS WITH CHRONIC KIDNEY DISEASE (HCC): Status: ACTIVE | Noted: 2022-03-08

## 2022-03-08 LAB
A/G RATIO: 1.6 (ref 1.1–2.2)
ALBUMIN SERPL-MCNC: 4.6 G/DL (ref 3.4–5)
ALP BLD-CCNC: 96 U/L (ref 40–129)
ALT SERPL-CCNC: 10 U/L (ref 10–40)
ANION GAP SERPL CALCULATED.3IONS-SCNC: 14 MMOL/L (ref 3–16)
AST SERPL-CCNC: 14 U/L (ref 15–37)
BASOPHILS ABSOLUTE: 0.1 K/UL (ref 0–0.2)
BASOPHILS RELATIVE PERCENT: 0.5 %
BILIRUB SERPL-MCNC: <0.2 MG/DL (ref 0–1)
BUN BLDV-MCNC: 20 MG/DL (ref 7–20)
CALCIUM SERPL-MCNC: 9.7 MG/DL (ref 8.3–10.6)
CHLORIDE BLD-SCNC: 99 MMOL/L (ref 99–110)
CHOLESTEROL, TOTAL: 115 MG/DL (ref 0–199)
CO2: 25 MMOL/L (ref 21–32)
CREAT SERPL-MCNC: 0.6 MG/DL (ref 0.8–1.3)
CREATININE URINE: 22.9 MG/DL (ref 39–259)
EOSINOPHILS ABSOLUTE: 0.4 K/UL (ref 0–0.6)
EOSINOPHILS RELATIVE PERCENT: 3.6 %
GFR AFRICAN AMERICAN: >60
GFR NON-AFRICAN AMERICAN: >60
GLUCOSE BLD-MCNC: 132 MG/DL (ref 70–99)
HCT VFR BLD CALC: 42.9 % (ref 40.5–52.5)
HDLC SERPL-MCNC: 52 MG/DL (ref 40–60)
HEMOGLOBIN: 14.2 G/DL (ref 13.5–17.5)
LDL CHOLESTEROL CALCULATED: 46 MG/DL
LYMPHOCYTES ABSOLUTE: 3.2 K/UL (ref 1–5.1)
LYMPHOCYTES RELATIVE PERCENT: 28.9 %
MCH RBC QN AUTO: 30.5 PG (ref 26–34)
MCHC RBC AUTO-ENTMCNC: 33 G/DL (ref 31–36)
MCV RBC AUTO: 92.6 FL (ref 80–100)
MICROALBUMIN UR-MCNC: <1.2 MG/DL
MICROALBUMIN/CREAT UR-RTO: ABNORMAL MG/G (ref 0–30)
MONOCYTES ABSOLUTE: 0.9 K/UL (ref 0–1.3)
MONOCYTES RELATIVE PERCENT: 8.4 %
NEUTROPHILS ABSOLUTE: 6.4 K/UL (ref 1.7–7.7)
NEUTROPHILS RELATIVE PERCENT: 58.6 %
PDW BLD-RTO: 13.8 % (ref 12.4–15.4)
PLATELET # BLD: 192 K/UL (ref 135–450)
PMV BLD AUTO: 8.8 FL (ref 5–10.5)
POTASSIUM SERPL-SCNC: 4.4 MMOL/L (ref 3.5–5.1)
PROSTATE SPECIFIC ANTIGEN: 1.22 NG/ML (ref 0–4)
RBC # BLD: 4.64 M/UL (ref 4.2–5.9)
SODIUM BLD-SCNC: 138 MMOL/L (ref 136–145)
TOTAL PROTEIN: 7.5 G/DL (ref 6.4–8.2)
TRIGL SERPL-MCNC: 86 MG/DL (ref 0–150)
VITAMIN D 25-HYDROXY: 28.3 NG/ML
VLDLC SERPL CALC-MCNC: 17 MG/DL
WBC # BLD: 11 K/UL (ref 4–11)

## 2022-03-08 PROCEDURE — 3046F HEMOGLOBIN A1C LEVEL >9.0%: CPT | Performed by: NURSE PRACTITIONER

## 2022-03-08 PROCEDURE — 99214 OFFICE O/P EST MOD 30 MIN: CPT | Performed by: NURSE PRACTITIONER

## 2022-03-08 RX ORDER — CHOLECALCIFEROL (VITAMIN D3) 125 MCG
5000 CAPSULE ORAL DAILY
COMMUNITY

## 2022-03-08 ASSESSMENT — PATIENT HEALTH QUESTIONNAIRE - PHQ9
2. FEELING DOWN, DEPRESSED OR HOPELESS: 2
7. TROUBLE CONCENTRATING ON THINGS, SUCH AS READING THE NEWSPAPER OR WATCHING TELEVISION: 1
10. IF YOU CHECKED OFF ANY PROBLEMS, HOW DIFFICULT HAVE THESE PROBLEMS MADE IT FOR YOU TO DO YOUR WORK, TAKE CARE OF THINGS AT HOME, OR GET ALONG WITH OTHER PEOPLE: 2
SUM OF ALL RESPONSES TO PHQ QUESTIONS 1-9: 8
9. THOUGHTS THAT YOU WOULD BE BETTER OFF DEAD, OR OF HURTING YOURSELF: 1
4. FEELING TIRED OR HAVING LITTLE ENERGY: 1
3. TROUBLE FALLING OR STAYING ASLEEP: 1
1. LITTLE INTEREST OR PLEASURE IN DOING THINGS: 2
SUM OF ALL RESPONSES TO PHQ QUESTIONS 1-9: 8
SUM OF ALL RESPONSES TO PHQ QUESTIONS 1-9: 7
SUM OF ALL RESPONSES TO PHQ QUESTIONS 1-9: 8
SUM OF ALL RESPONSES TO PHQ9 QUESTIONS 1 & 2: 4
5. POOR APPETITE OR OVEREATING: 0
6. FEELING BAD ABOUT YOURSELF - OR THAT YOU ARE A FAILURE OR HAVE LET YOURSELF OR YOUR FAMILY DOWN: 0
8. MOVING OR SPEAKING SO SLOWLY THAT OTHER PEOPLE COULD HAVE NOTICED. OR THE OPPOSITE, BEING SO FIGETY OR RESTLESS THAT YOU HAVE BEEN MOVING AROUND A LOT MORE THAN USUAL: 0

## 2022-03-08 ASSESSMENT — COLUMBIA-SUICIDE SEVERITY RATING SCALE - C-SSRS
2. HAVE YOU ACTUALLY HAD ANY THOUGHTS OF KILLING YOURSELF?: NO
6. HAVE YOU EVER DONE ANYTHING, STARTED TO DO ANYTHING, OR PREPARED TO DO ANYTHING TO END YOUR LIFE?: NO
1. WITHIN THE PAST MONTH, HAVE YOU WISHED YOU WERE DEAD OR WISHED YOU COULD GO TO SLEEP AND NOT WAKE UP?: YES

## 2022-03-08 NOTE — PROGRESS NOTES
SUBJECTIVE:    Patient ID: Nancy Causey is a 78 y.o. male. CC: Hypertension, depression, BPH, hyperlipidemia, diabetes, COPD    HPI: The patient presents to the office today for follow-up of chronic medical conditions. Patient reports he is doing \"about the same. \"  He is frustrated about computer problems at home. He has a history of hypertension and hyperlipidemia. He denies any chest pain or shortness of breath. He is compliant with his medication regimen. He has a history of diabetes. This has been reasonably well controlled with most recent A1c 7.7 which was down from 7.8. He does not bring any blood sugar readings with him today. He has been a patient of endocrinology though is endocrinology NP is leaving. He has a history of anxiety, depression, bipolar disorder. Overall, he feels this is stable. He has a history of COPD. He denies any recent exacerbation. Declines colonoscopy      Past Medical History:   Diagnosis Date    Allergic rhinitis     arthri     Arthritis     lumbar spine    CAD (coronary artery disease)     Chronic kidney disease     retention, bph    Constipation     DM [facility] mellitus) [facility]     Generalized pain     GERD (gastroesophageal reflux disease)     constipation    Heart attack ([facility] [address]     Heart disease     Hyperlipidemia     Movement disorder     lumbar spine arthritis    Psychiatric problem     ptsd, anxiety, depression    Urinary retention         Current Outpatient Medications   Medication Sig Dispense Refill    vitamin D (VITAMIN D3 ULTRA STRENGTH) 125 MCG (5000 UT) CAPS capsule Take 5,000 Units by mouth daily 2 x a week      QUEtiapine (SEROQUEL) 400 MG tablet TAKE ONE TABLET BY MOUTH DAILY IN THE EVENING.  30 tablet 11    metFORMIN (GLUCOPHAGE-XR) 500 MG extended release tablet Take 4 tablets by mouth daily (with breakfast) 120 tablet 11    tamsulosin (FLOMAX) 0.4 MG capsule Take 1 capsule by mouth daily 30 capsule 11    simvastatin (ZOCOR) 40 MG tablet Take 1 tablet by mouth nightly 30 tablet 11    buPROPion (WELLBUTRIN SR) 100 MG extended release tablet Take 1 tablet by mouth 2 times daily 60 tablet 11    potassium chloride (KLOR-CON) 10 MEQ extended release tablet Take 1 tablet by mouth daily 30 tablet 11    propranolol (INDERAL LA) 60 MG extended release capsule Take 1 capsule by mouth daily 30 capsule 11    primidone (MYSOLINE) 50 MG tablet Take 1 tablet by mouth daily 30 tablet 11    Continuous Blood Gluc Sensor (FREESTYLE MILLIE 14 DAY SENSOR) [facility] [place] USE FOR CONTINUOUS GLUCOSE MONITORING. CHANGE SENSOR EVERY 14 DAYS 2 each 11    BASAGLAR KWIKPEN 100 UNIT/ML injection pen INJECT 30 UNITS SUB-Q TWICE DAILY 30 mL 0    aspirin (ASPIRIN LOW DOSE) 81 MG EC tablet Take 1 tablet by mouth daily 30 tablet 11    insulin aspart (NOVOLOG FLEXPEN) 100 UNIT/ML injection pen Up to 100units Tid 15 mL 11    Continuous Blood Gluc  (FREESTYLE MILLIE 14 DAY READER) REEMA 1 Units by Does not apply route as needed (as needed) 1 Device 0    Continuous Blood Gluc  (FREESTYLE MILLIE 14 DAY READER) REEMA 1 Units by Does not apply route as needed (as needed) 1 Device 0    Continuous Blood Gluc  (FREESTYLE MILLIE 14 DAY READER) REEMA 1 Units by Does not apply route as needed (as needed) 1 Device 0    Melatonin 10 MG CAPS Take 80 mg by mouth nightly Pt taking 100 mg at bed time      diphenhydrAMINE (BENADRYL) 25 MG tablet Take 50 mg by mouth every 3-4 hours as needed for Allergies or Sleep       Meals on Wheels MISC by Does not apply route 1 each 0    Insulin Pen Needle (PEN NEEDLES 5/16\") 30G X 8 MM MISC 1 each by Does not apply route 5 times daily 200 each 3     No current facility-administered medications for this visit. Review of Systems   Constitutional: Positive for fatigue. Respiratory: Negative. Negative for shortness of breath. Cardiovascular: Negative. Negative for chest pain. Gastrointestinal: Negative. Genitourinary: Negative. Musculoskeletal: Negative. Neurological: Negative. Psychiatric/Behavioral: Negative. OBJECTIVE:  Physical Exam  Constitutional:       Appearance: He is well-developed. Comments: Unkept appearing gentleman in wheelchair who is alert, oriented   HENT:      Head: Normocephalic and atraumatic. Eyes:      Comments: Right eye closed   Neck:      Vascular: No JVD. Cardiovascular:      Rate and Rhythm: Normal rate and regular rhythm. Pulmonary:      Effort: Pulmonary effort is normal. No respiratory distress. Breath sounds: Normal breath sounds. No wheezing or rales. Abdominal:      General: There is no distension. Tenderness: There is no guarding. Musculoskeletal:         General: No tenderness. Cervical back: Neck supple. Skin:     General: Skin is warm and dry. Neurological:      Mental Status: He is alert and oriented to person, place, and time. Psychiatric:         Attention and Perception: Attention normal.         Mood and Affect: Mood is depressed. Affect is blunt. Speech: Speech normal.        /68   Pulse 80   Ht 5' 11\" (1.803 m)   Wt 168 lb (76.2 kg)   SpO2 98%   BMI 23.43 kg/m²      PHQ Scores 3/8/2022 1/14/2021 6/28/2019 5/3/2019 10/25/2018 8/31/2018 5/25/2018   PHQ2 Score 4 0 6 5 5 4 3   PHQ9 Score 8 0 22 18 20 13 18     Interpretation of Total Score Depression Severity: 1-4 = Minimal depression, 5-9 = Mild depression, 10-14 = Moderate depression, 15-19 = Moderately severe depression, 20-27 =Severe depression            ASSESSMENT/PLAN:  Clemente Brantley was seen today for annual exam, hypertension and depression. Diagnoses and all orders for this visit:    Essential hypertension  - Normotensive  - he has met JNC standards.  - Continue current regimen. -     Comprehensive Metabolic Panel; Future  -     Hemoglobin A1C; Future  -     LIPID PANEL; Future    Mixed hyperlipidemia  -     Comprehensive Metabolic Panel;  Future  - Hemoglobin A1C; Future  -     LIPID PANEL; Future    Type 2 diabetes mellitus without complication, with long-term current use of insulin ([facility] [address]  - His endocrinology NP is leaving organization  -     Comprehensive Metabolic Panel; Future  -     Hemoglobin A1C; Future  -     LIPID PANEL; Future  -     MICROALBUMIN / CREATININE URINE RATIO; Future    Centrilobular emphysema (HCC)  -     CBC with Auto Differential; Future    Vitamin D deficiency  -     Vitamin D 25 Hydroxy; Future    Benign prostatic hyperplasia with urinary frequency  -     PSA Screening; Future    Bipolar affective disorder, currently depressed, moderate ([facility] [address]  -     Comprehensive Metabolic Panel; Future    Prostate cancer screening  -     PSA Screening; Future    Personal history of tobacco use  -     ID VISIT TO DISCUSS LUNG CA SCREEN W LDCT  -     CT Lung Screen (Annual); Future    Low Dose CT (LDCT) Lung Screening criteria met:     Age 55-77(Medicare) or 50-80 ([facility])   Pack year smoking >30 (Medicare) or >20 ([facility])   Still smoking or less than 15 year since quit   No sign or symptoms of lung cancer   > 11 months since last LDCT     Risks and benefits of lung cancer screening with LDCT scans discussed:    Significance of positive screen - False-positive LDCT results often occur. 95% of all positive results do not lead to a diagnosis of cancer. Usually further imaging can resolve most false-positive results; however, some patients may require invasive procedures. Over diagnosis risk - 10% to 12% of screen-detected lung cancer cases are over diagnosed--that is, the cancer would not have been detected in the patient's lifetime without the screening. Need for follow up screens annually to continue lung cancer screening effectiveness     Risks associated with radiation from annual LDCT- Radiation exposure is about the same as for a mammogram, which is about 1/3 of the annual background radiation exposure from everyday life.   Starting screening at age 54 is not likely to increase cancer risk from radiation exposure. Patients with comorbidities resulting in life expectancy of < 10 years, or that would preclude treatment of an abnormality identified on CT, should not be screened due to lack of benefit.     To obtain maximal benefit from this screening, smoking cessation and long-term abstinence from smoking is critical        Luke Schultz APRN - CNP

## 2022-03-09 LAB
ESTIMATED AVERAGE GLUCOSE: 228.8 MG/DL
HBA1C MFR BLD: 9.6 %

## 2022-03-21 ASSESSMENT — ENCOUNTER SYMPTOMS
GASTROINTESTINAL NEGATIVE: 1
SHORTNESS OF BREATH: 0
RESPIRATORY NEGATIVE: 1

## 2022-06-03 RX ORDER — ASPIRIN 81 MG/1
TABLET, COATED ORAL
Qty: 30 TABLET | Refills: 5 | Status: SHIPPED | OUTPATIENT
Start: 2022-06-03

## 2022-06-10 ENCOUNTER — TELEPHONE (OUTPATIENT)
Dept: INTERNAL MEDICINE CLINIC | Age: 67
End: 2022-06-10

## 2022-06-10 DIAGNOSIS — Z79.4 TYPE 2 DIABETES MELLITUS WITHOUT COMPLICATION, WITH LONG-TERM CURRENT USE OF INSULIN (HCC): Primary | ICD-10-CM

## 2022-06-10 DIAGNOSIS — E11.9 TYPE 2 DIABETES MELLITUS WITHOUT COMPLICATION, WITH LONG-TERM CURRENT USE OF INSULIN (HCC): Primary | ICD-10-CM

## 2022-06-10 NOTE — TELEPHONE ENCOUNTER
Lior Gillis with [employer] states patient is receiving therapeutic diabetic meals and [place] [facility] requires an updated prescription each year for patient file. She is requesting prescription be faxed to her at #[phone].

## 2022-08-18 DIAGNOSIS — Z79.4 TYPE 2 DIABETES MELLITUS WITHOUT COMPLICATION, WITH LONG-TERM CURRENT USE OF INSULIN (HCC): ICD-10-CM

## 2022-08-18 DIAGNOSIS — E11.9 TYPE 2 DIABETES MELLITUS WITHOUT COMPLICATION, WITH LONG-TERM CURRENT USE OF INSULIN (HCC): ICD-10-CM

## 2022-08-18 RX ORDER — INSULIN ASPART 100 [IU]/ML
INJECTION, SOLUTION INTRAVENOUS; SUBCUTANEOUS
Qty: 15 ML | Refills: 5 | Status: SHIPPED | OUTPATIENT
Start: 2022-08-18

## 2022-09-09 ENCOUNTER — OFFICE VISIT (OUTPATIENT)
Dept: INTERNAL MEDICINE CLINIC | Age: 67
End: 2022-09-09
Payer: COMMERCIAL

## 2022-09-09 VITALS
SYSTOLIC BLOOD PRESSURE: 120 MMHG | HEIGHT: 71 IN | BODY MASS INDEX: 21.84 KG/M2 | DIASTOLIC BLOOD PRESSURE: 70 MMHG | WEIGHT: 156 LBS | OXYGEN SATURATION: 94 % | HEART RATE: 86 BPM

## 2022-09-09 DIAGNOSIS — J43.2 CENTRILOBULAR EMPHYSEMA (HCC): ICD-10-CM

## 2022-09-09 DIAGNOSIS — F31.32 BIPOLAR AFFECTIVE DISORDER, CURRENTLY DEPRESSED, MODERATE (HCC): ICD-10-CM

## 2022-09-09 DIAGNOSIS — I10 ESSENTIAL HYPERTENSION: Primary | ICD-10-CM

## 2022-09-09 DIAGNOSIS — E11.65 UNCONTROLLED TYPE 2 DIABETES MELLITUS WITH HYPERGLYCEMIA (HCC): ICD-10-CM

## 2022-09-09 DIAGNOSIS — E78.2 MIXED HYPERLIPIDEMIA: ICD-10-CM

## 2022-09-09 PROCEDURE — 99214 OFFICE O/P EST MOD 30 MIN: CPT | Performed by: NURSE PRACTITIONER

## 2022-09-09 PROCEDURE — 3046F HEMOGLOBIN A1C LEVEL >9.0%: CPT | Performed by: NURSE PRACTITIONER

## 2022-09-09 PROCEDURE — 1123F ACP DISCUSS/DSCN MKR DOCD: CPT | Performed by: NURSE PRACTITIONER

## 2022-09-09 RX ORDER — INSULIN GLARGINE 100 [IU]/ML
INJECTION, SOLUTION SUBCUTANEOUS
Qty: 30 ML | Refills: 0 | Status: SHIPPED | OUTPATIENT
Start: 2022-09-09

## 2022-09-09 RX ORDER — FLASH GLUCOSE SENSOR
KIT MISCELLANEOUS
Qty: 2 EACH | Refills: 11 | Status: SHIPPED | OUTPATIENT
Start: 2022-09-09

## 2022-09-09 RX ORDER — INSULIN ASPART 100 [IU]/ML
INJECTION, SOLUTION INTRAVENOUS; SUBCUTANEOUS
Qty: 15 ML | Refills: 11 | Status: SHIPPED | OUTPATIENT
Start: 2022-09-09

## 2022-09-10 LAB
ESTIMATED AVERAGE GLUCOSE: 294.8 MG/DL
HBA1C MFR BLD: 11.9 %

## 2022-09-19 ASSESSMENT — ENCOUNTER SYMPTOMS
RESPIRATORY NEGATIVE: 1
GASTROINTESTINAL NEGATIVE: 1
SHORTNESS OF BREATH: 0

## 2022-09-19 NOTE — PROGRESS NOTES
SUBJECTIVE:    Patient ID: Clary Orr is a 78 y.o. male. CC: Hypertension, depression, BPH, hyperlipidemia, diabetes, COPD    HPI: The patient presents to the office today for follow-up of chronic medical conditions. Patient reports he is doing \"about the same. \"      He has a history of hypertension and hyperlipidemia. He denies any chest pain or shortness of breath. He is compliant with his medication regimen. He has a history of diabetes. This had been reasonably well controlled with A1c 7.7 - 7.8. More recently this has worsened with A1c up to 9.6. He is wanting to change to continuous glucose monitoring. He does not bring any blood sugar readings with him today. He has been a patient of endocrinology though is endocrinology NP he was seeing left the practice. He has a history of anxiety, depression, bipolar disorder. Overall, he feels this is stable. He has a history of COPD. He denies any recent exacerbation. Declines colonoscopy      Past Medical History:   Diagnosis Date    Allergic rhinitis     arthri     Arthritis     lumbar spine    CAD (coronary artery disease)     Chronic kidney disease     retention, bph    Constipation     DM (diabetes mellitus) (HCC)     Generalized pain     GERD (gastroesophageal reflux disease)     constipation    Heart attack ([facility])     Heart disease     Hyperlipidemia     Movement disorder     lumbar spine arthritis    Psychiatric problem     ptsd, anxiety, depression    Urinary retention         Current Outpatient Medications   Medication Sig Dispense Refill    Continuous Blood Gluc Sensor ([place] [employer] MILLIE 14 DAY SENSOR) [facility] [place] USE FOR CONTINUOUS GLUCOSE MONITORING.  CHANGE SENSOR EVERY 14 DAYS 2 each 11    insulin glargine (BASAGLAR KWIKPEN) 100 UNIT/ML injection pen INJECT 20 UNITS SUB-Q TWICE DAILY 30 mL 0    insulin aspart (NOVOLOG FLEXPEN) 100 UNIT/ML injection pen Up to 100units Tid 15 mL 11    Insulin Aspart FlexPen 100 UNIT/ML SOPN INJECT UP  UNITS SUB-Q 3 TIMES A DAY AS DIRECTED 15 mL 5    [employer]als [employer] Whe[employer] MISC by Does not apply route Diabetic diet 1 each 0    ASPIRIN LOW DOSE 81 MG EC tablet TAKE ONE TABLET BY MOUTH EVERY DAY 30 tablet 5    vitamin D (VITAMIN D3 ULTRA STRENGTH) 125 MCG (5000 UT) CAPS capsule Take 5,000 Units by mouth daily 2 x a week      QUEtiapine (SEROQUEL) 400 MG tablet TAKE ONE TABLET BY MOUTH DAILY IN THE EVENING. 30 tablet 11    metFORMIN (GLUCOPHAGE-XR) 500 MG extended release tablet Take 4 tablets by mouth daily (with breakfast) 120 tablet 11    tamsulosin (FLOMAX) 0.4 MG capsule Take 1 capsule by mouth daily 30 capsule 11    simvastatin (ZOCOR) 40 MG tablet Take 1 tablet by mouth nightly 30 tablet 11    buPROPion (WELLBUTRIN SR) 100 MG extended release tablet Take 1 tablet by mouth 2 times daily 60 tablet 11    potassium chloride (KLOR-CON) 10 MEQ extended release tablet Take 1 tablet by mouth daily 30 tablet 11    propranolol (INDERAL LA) 60 MG extended release capsule Take 1 capsule by mouth daily 30 capsule 11    primidone (MYSOLINE) 50 MG tablet Take 1 tablet by mouth daily 30 tablet 11    Insulin Pen Needle (PEN NEEDLES 5/16\") 30G X 8 MM MISC 1 each by Does not apply route 5 times daily 200 each 3    Continuous Blood Gluc  (FREESTYLE MILLIE 14 DAY READER) REEMA 1 Units by Does not apply route as needed (as needed) 1 Device 0    Continuous Blood Gluc  (FREESTYLE MILLIE 14 DAY READER) REEMA 1 Units by Does not apply route as needed (as needed) 1 Device 0    Continuous Blood Gluc  (FREESTYLE MILLIE 14 DAY READER) REEMA 1 Units by Does not apply route as needed (as needed) 1 Device 0    Melatonin 10 MG CAPS Take 80 mg by mouth nightly Pt taking 100 mg at bed time      diphenhydrAMINE (BENADRYL) 25 MG tablet Take 50 mg by mouth every 3-4 hours as needed for Allergies or Sleep        No current facility-administered medications for this visit.            Review of Systems   Constitutional:  Positive for fatigue. Respiratory: Negative. Negative for shortness of breath. Cardiovascular: Negative. Negative for chest pain. Gastrointestinal: Negative. Genitourinary: Negative. Musculoskeletal: Negative. Neurological: Negative. Psychiatric/Behavioral: Negative. OBJECTIVE:  Physical Exam  Constitutional:       Appearance: He is well-developed. Comments: Unkept appearing gentleman in wheelchair who is alert, oriented   HENT:      Head: Normocephalic and atraumatic. Eyes:      Comments: Right eye closed   Neck:      Vascular: No JVD. Cardiovascular:      Rate and Rhythm: Normal rate and regular rhythm. Pulmonary:      Effort: Pulmonary effort is normal. No respiratory distress. Breath sounds: Normal breath sounds. No wheezing or rales. Abdominal:      General: There is no distension. Tenderness: There is no guarding. Musculoskeletal:         General: No tenderness. Cervical back: Neck supple. Skin:     General: Skin is warm and dry. Neurological:      Mental Status: He is alert and oriented to person, place, and time. Psychiatric:         Attention and Perception: Attention normal.         Mood and Affect: Mood is depressed. Affect is blunt. Speech: Speech normal.      /70   Pulse 86   Ht 5' 11\" (1.803 m)   Wt 156 lb (70.8 kg)   SpO2 94%   BMI 21.76 kg/m²      PHQ Scores 3/8/2022 1/14/2021 6/28/2019 5/3/2019 10/25/2018 8/31/2018 5/25/2018   PHQ2 Score 4 0 6 5 5 4 3   PHQ9 Score 8 0 22 18 20 13 18     Interpretation of Total Score Depression Severity: 1-4 = Minimal depression, 5-9 = Mild depression, 10-14 = Moderate depression, 15-19 = Moderately severe depression, 20-27 =Severe depression      Assessment/Plan:  Kathy Walker was seen today for 6 month follow-up and discuss medications. Diagnoses and all orders for this visit:    Essential hypertension  - Normotensive  - he has met JNC standards.  - Continue current regimen.     Mixed hyperlipidemia  - Chronic, stable  - Continue current regimen    Uncontrolled type 2 diabetes mellitus with hyperglycemia ([facility] [address]  - See HPI  - Not sure his status with endo. The NP he was seeing left the practice  - DM control worsening  -     Continuous Blood Gluc Sensor (FREESTYLE MILLIE 14 DAY SENSOR) [facility] [place]; USE FOR CONTINUOUS GLUCOSE MONITORING. CHANGE SENSOR EVERY 14 DAYS  -     insulin glargine (BASAGLAR KWIKPEN) 100 UNIT/ML injection pen; INJECT 20 UNITS SUB-Q TWICE DAILY  -     insulin aspart (NOVOLOG FLEXPEN) 100 UNIT/ML injection pen;  Up to 100units Tid  -     Hemoglobin A1C    Centrilobular emphysema (HCC)  - Chronic, stable  - Continue current regimen    Bipolar affective disorder, currently depressed, moderate (HCC)  - Chronic, stable  - Continue current regimen        YULIA Joyce - CNP

## 2023-01-18 ENCOUNTER — TELEPHONE (OUTPATIENT)
Dept: INTERNAL MEDICINE CLINIC | Age: 68
End: 2023-01-18

## 2023-02-07 ENCOUNTER — OFFICE VISIT (OUTPATIENT)
Dept: INTERNAL MEDICINE CLINIC | Age: 68
End: 2023-02-07

## 2023-02-07 VITALS
OXYGEN SATURATION: 95 % | HEART RATE: 94 BPM | BODY MASS INDEX: 22.68 KG/M2 | SYSTOLIC BLOOD PRESSURE: 138 MMHG | HEIGHT: 71 IN | WEIGHT: 162 LBS | DIASTOLIC BLOOD PRESSURE: 80 MMHG

## 2023-02-07 DIAGNOSIS — Z23 NEED FOR PNEUMOCOCCAL VACCINE: ICD-10-CM

## 2023-02-07 DIAGNOSIS — Z23 FLU VACCINE NEED: ICD-10-CM

## 2023-02-07 DIAGNOSIS — F33.9 RECURRENT MAJOR DEPRESSIVE EPISODES (HCC): ICD-10-CM

## 2023-02-07 DIAGNOSIS — F43.10 POSTTRAUMATIC STRESS DISORDER: ICD-10-CM

## 2023-02-07 DIAGNOSIS — G25.2 ACTION TREMOR: ICD-10-CM

## 2023-02-07 DIAGNOSIS — N40.1 BENIGN PROSTATIC HYPERPLASIA WITH URINARY FREQUENCY: ICD-10-CM

## 2023-02-07 DIAGNOSIS — F31.32 BIPOLAR AFFECTIVE DISORDER, CURRENTLY DEPRESSED, MODERATE (HCC): ICD-10-CM

## 2023-02-07 DIAGNOSIS — E11.65 UNCONTROLLED TYPE 2 DIABETES MELLITUS WITH HYPERGLYCEMIA (HCC): ICD-10-CM

## 2023-02-07 DIAGNOSIS — Z00.00 INITIAL MEDICARE ANNUAL WELLNESS VISIT: Primary | ICD-10-CM

## 2023-02-07 DIAGNOSIS — R35.0 BENIGN PROSTATIC HYPERPLASIA WITH URINARY FREQUENCY: ICD-10-CM

## 2023-02-07 DIAGNOSIS — E78.2 MIXED HYPERLIPIDEMIA: ICD-10-CM

## 2023-02-07 DIAGNOSIS — I10 ESSENTIAL HYPERTENSION: ICD-10-CM

## 2023-02-07 LAB
CHOLESTEROL, TOTAL: 129 MG/DL (ref 0–199)
HDLC SERPL-MCNC: 57 MG/DL (ref 40–60)
LDL CHOLESTEROL CALCULATED: 58 MG/DL
TRIGL SERPL-MCNC: 71 MG/DL (ref 0–150)
VLDLC SERPL CALC-MCNC: 14 MG/DL

## 2023-02-07 RX ORDER — ASPIRIN 81 MG/1
TABLET ORAL
Qty: 30 TABLET | Refills: 5 | Status: SHIPPED | OUTPATIENT
Start: 2023-02-07

## 2023-02-07 RX ORDER — PRIMIDONE 50 MG/1
50 TABLET ORAL DAILY
Qty: 30 TABLET | Refills: 11 | Status: SHIPPED | OUTPATIENT
Start: 2023-02-07

## 2023-02-07 RX ORDER — SIMVASTATIN 40 MG
40 TABLET ORAL NIGHTLY
Qty: 30 TABLET | Refills: 11 | Status: SHIPPED | OUTPATIENT
Start: 2023-02-07

## 2023-02-07 RX ORDER — PROPRANOLOL HCL 60 MG
60 CAPSULE, EXTENDED RELEASE 24HR ORAL DAILY
Qty: 30 CAPSULE | Refills: 11 | Status: SHIPPED | OUTPATIENT
Start: 2023-02-07

## 2023-02-07 RX ORDER — BUPROPION HYDROCHLORIDE 100 MG/1
100 TABLET, EXTENDED RELEASE ORAL 2 TIMES DAILY
Qty: 60 TABLET | Refills: 11 | Status: SHIPPED | OUTPATIENT
Start: 2023-02-07

## 2023-02-07 RX ORDER — METFORMIN HYDROCHLORIDE 500 MG/1
2000 TABLET, EXTENDED RELEASE ORAL
Qty: 120 TABLET | Refills: 11 | Status: SHIPPED | OUTPATIENT
Start: 2023-02-07

## 2023-02-07 RX ORDER — DULAGLUTIDE 0.75 MG/.5ML
0.75 INJECTION, SOLUTION SUBCUTANEOUS WEEKLY
Qty: 2 ML | Refills: 5 | Status: SHIPPED | OUTPATIENT
Start: 2023-02-07

## 2023-02-07 RX ORDER — TAMSULOSIN HYDROCHLORIDE 0.4 MG/1
0.4 CAPSULE ORAL DAILY
Qty: 30 CAPSULE | Refills: 11 | Status: SHIPPED | OUTPATIENT
Start: 2023-02-07

## 2023-02-07 RX ORDER — QUETIAPINE FUMARATE 400 MG/1
TABLET, FILM COATED ORAL
Qty: 30 TABLET | Refills: 11 | Status: SHIPPED | OUTPATIENT
Start: 2023-02-07

## 2023-02-07 RX ORDER — INSULIN GLARGINE 100 [IU]/ML
INJECTION, SOLUTION SUBCUTANEOUS
Qty: 30 ML | Refills: 0 | Status: SHIPPED | OUTPATIENT
Start: 2023-02-07

## 2023-02-07 RX ORDER — POTASSIUM CHLORIDE 750 MG/1
10 TABLET, FILM COATED, EXTENDED RELEASE ORAL DAILY
Qty: 30 TABLET | Refills: 11 | Status: SHIPPED | OUTPATIENT
Start: 2023-02-07

## 2023-02-07 SDOH — ECONOMIC STABILITY: HOUSING INSECURITY
IN THE LAST 12 MONTHS, WAS THERE A TIME WHEN YOU DID NOT HAVE A STEADY PLACE TO SLEEP OR SLEPT IN A SHELTER (INCLUDING NOW)?: NO

## 2023-02-07 SDOH — ECONOMIC STABILITY: INCOME INSECURITY: HOW HARD IS IT FOR YOU TO PAY FOR THE VERY BASICS LIKE FOOD, HOUSING, MEDICAL CARE, AND HEATING?: NOT HARD AT ALL

## 2023-02-07 SDOH — ECONOMIC STABILITY: FOOD INSECURITY: WITHIN THE PAST 12 MONTHS, THE FOOD YOU BOUGHT JUST DIDN'T LAST AND YOU DIDN'T HAVE MONEY TO GET MORE.: NEVER TRUE

## 2023-02-07 SDOH — ECONOMIC STABILITY: FOOD INSECURITY: WITHIN THE PAST 12 MONTHS, YOU WORRIED THAT YOUR FOOD WOULD RUN OUT BEFORE YOU GOT MONEY TO BUY MORE.: NEVER TRUE

## 2023-02-07 ASSESSMENT — PATIENT HEALTH QUESTIONNAIRE - PHQ9
1. LITTLE INTEREST OR PLEASURE IN DOING THINGS: 3
SUM OF ALL RESPONSES TO PHQ QUESTIONS 1-9: 16
7. TROUBLE CONCENTRATING ON THINGS, SUCH AS READING THE NEWSPAPER OR WATCHING TELEVISION: 1
SUM OF ALL RESPONSES TO PHQ QUESTIONS 1-9: 16
SUM OF ALL RESPONSES TO PHQ QUESTIONS 1-9: 15
3. TROUBLE FALLING OR STAYING ASLEEP: 3
8. MOVING OR SPEAKING SO SLOWLY THAT OTHER PEOPLE COULD HAVE NOTICED. OR THE OPPOSITE, BEING SO FIGETY OR RESTLESS THAT YOU HAVE BEEN MOVING AROUND A LOT MORE THAN USUAL: 0
6. FEELING BAD ABOUT YOURSELF - OR THAT YOU ARE A FAILURE OR HAVE LET YOURSELF OR YOUR FAMILY DOWN: 0
5. POOR APPETITE OR OVEREATING: 3
4. FEELING TIRED OR HAVING LITTLE ENERGY: 3
10. IF YOU CHECKED OFF ANY PROBLEMS, HOW DIFFICULT HAVE THESE PROBLEMS MADE IT FOR YOU TO DO YOUR WORK, TAKE CARE OF THINGS AT HOME, OR GET ALONG WITH OTHER PEOPLE: 0
2. FEELING DOWN, DEPRESSED OR HOPELESS: 2
SUM OF ALL RESPONSES TO PHQ9 QUESTIONS 1 & 2: 5
9. THOUGHTS THAT YOU WOULD BE BETTER OFF DEAD, OR OF HURTING YOURSELF: 1
SUM OF ALL RESPONSES TO PHQ QUESTIONS 1-9: 16

## 2023-02-07 ASSESSMENT — LIFESTYLE VARIABLES
HOW MANY STANDARD DRINKS CONTAINING ALCOHOL DO YOU HAVE ON A TYPICAL DAY: 1 OR 2
HOW OFTEN DO YOU HAVE A DRINK CONTAINING ALCOHOL: 4 OR MORE TIMES A WEEK

## 2023-02-07 ASSESSMENT — COLUMBIA-SUICIDE SEVERITY RATING SCALE - C-SSRS
2. HAVE YOU ACTUALLY HAD ANY THOUGHTS OF KILLING YOURSELF?: NO
6. HAVE YOU EVER DONE ANYTHING, STARTED TO DO ANYTHING, OR PREPARED TO DO ANYTHING TO END YOUR LIFE?: NO
1. WITHIN THE PAST MONTH, HAVE YOU WISHED YOU WERE DEAD OR WISHED YOU COULD GO TO SLEEP AND NOT WAKE UP?: NO

## 2023-02-07 NOTE — PATIENT INSTRUCTIONS
Preventing Falls: Care Instructions  Overview     Getting around your home safely can be a challenge if you have injuries or health problems that make it easy for you to fall. Loose rugs and furniture in walkways are among the dangers for many older people who have problems walking or who have poor eyesight. People who have conditions such as arthritis, osteoporosis, or dementia also have to be careful not to fall. You can make your home safer with a few simple measures. Follow-up care is a key part of your treatment and safety. Be sure to make and go to all appointments, and call your doctor if you are having problems. It's also a good idea to know your test results and keep a list of the medicines you take. How can you care for yourself at home? Taking care of yourself  Exercise regularly to improve your strength, muscle tone, and balance. Walk if you can. Swimming may be a good choice if you cannot walk easily. Have your vision and hearing checked each year or any time you notice a change. If you have trouble seeing and hearing, you might not be able to avoid objects and could lose your balance. Know the side effects of the medicines you take. Ask your doctor or pharmacist whether the medicines you take can affect your balance. Sleeping pills or sedatives can affect your balance. Limit the amount of alcohol you drink. Alcohol can impair your balance and other senses. Ask your doctor whether calluses or corns on your feet need to be removed. If you wear loose-fitting shoes because of calluses or corns, you can lose your balance and fall. Talk to your doctor if you have numbness in your feet. You may get dizzy if you do not drink enough water. To prevent dehydration, drink plenty of fluids. Choose water and other clear liquids. If you have kidney, heart, or liver disease and have to limit fluids, talk with your doctor before you increase the amount of fluids you drink.   Preventing falls at home  Remove raised doorway thresholds, throw rugs, and clutter. Repair loose carpet or raised areas in the floor. Move furniture and electrical cords to keep them out of walking paths. Use nonskid floor wax, and wipe up spills right away, especially on ceramic tile floors. If you use a walker or cane, put rubber tips on it. If you use crutches, clean the bottoms of them regularly with an abrasive pad, such as steel wool. Keep your house well lit, especially stairways, porches, and outside walkways. Use night-lights in areas such as hallways and bathrooms. Add extra light switches or use remote switches (such as switches that go on or off when you clap your hands) to make it easier to turn lights on if you have to get up during the night. Install sturdy handrails on stairways. Move items in your cabinets so that the things you use a lot are on the lower shelves (about waist level). Keep a cordless phone and a flashlight with new batteries by your bed. If possible, put a phone in each of the main rooms of your house, or carry a cell phone in case you fall and cannot reach a phone. Or, you can wear a device around your neck or wrist. You push a button that sends a signal for help. Wear low-heeled shoes that fit well and give your feet good support. Use footwear with nonskid soles. Check the heels and soles of your shoes for wear. Repair or replace worn heels or soles. Do not wear socks without shoes on smooth floors, such as wood. Walk on the grass when the sidewalks are slippery. If you live in an area that gets snow and ice in the winter, sprinkle salt on slippery steps and sidewalks. Or ask a family member or friend to do this for you. Preventing falls in the bath  Install grab bars and nonskid mats inside and outside your shower or tub and near the toilet and sinks. Use shower chairs and bath benches. Use a hand-held shower head that will allow you to sit while showering.   Get into a tub or shower by putting the weaker leg in first. Get out of a tub or shower with your strong side first.  Repair loose toilet seats and consider installing a raised toilet seat to make getting on and off the toilet easier. Keep your bathroom door unlocked while you are in the shower. Where can you learn more? Go to http://www.daigle.com/ and enter G117 to learn more about \"Preventing Falls: Care Instructions. \"  Current as of: May 4, 2022               Content Version: 13.5  © 9053-4068 Healthwise, [employer]. Care instructions adapted under license by [facility] ([facility]). If you have questions about a medical condition or this instruction, always ask your healthcare professional. [address] any warranty or liability for your use of this information. Learning About Mindfulness for Stress  What are mindfulness and stress? Stress is what you feel when you have to handle more than you are used to. A lot of things can cause stress. You may feel stress when you go on a job interview, take a test, or run a race. This kind of short-term stress is normal and even useful. It can help you if you need to work hard or react quickly. Stress also can last a long time. Long-term stress is caused by stressful situations or events. Examples of long-term stress include long-term health problems, ongoing problems at work, and conflicts in your family. Long-term stress can harm your health. Mindfulness is a focus only on things happening in the present moment. It's a process of purposefully paying attention to and being aware of your surroundings, your emotions, your thoughts, and how your body feels. You are aware of these things, but you aren't judging these experiences as \"good\" or \"bad. \" Mindfulness can help you learn to calm your mind and body to help you cope with illness, pain, and stress. How does mindfulness help to relieve stress? Mindfulness can help quiet your mind and relax your body. Studies show that it can help some people sleep better, feel less anxious, and bring their blood pressure down. And it's been shown to help some people live and cope better with certain health problems like heart disease, depression, chronic pain, and cancer. How do you practice mindfulness? To be mindful is to pay attention, to be present, and to be accepting. When you're mindful, you do just one thing and you pay close attention to that one thing. For example, you may sit quietly and notice your emotions or how your food tastes and smells. When you're present, you focus on the things that are happening right now. You let go of your thoughts about the past and the future. When you dwell on the past or the future, you miss moments that can heal and strengthen you. You may miss moments like hearing a child laugh or seeing a friendly face when you think you're all alone. When you're accepting, you don't  the present moment. Instead you accept your thoughts and feelings as they come. You can practice anytime, anywhere, and in any way you choose. You can practice in many ways. Here are a few ideas:  While doing your chores, like washing the dishes, let your mind focus on what's in your hand. What does the dish feel like? Is the water warm or cold? Go outside and take a few deep breaths. What is the air like? Is it warm or cold? When you can, take some time at the start of your day to sit alone and think. Take a slow walk by yourself. Count your steps while you breathe in and out. Try yoga breathing exercises, stretches, and poses to strengthen and relax your muscles. At work, if you can, try to stop for a few moments each hour. Note how your body feels. Let yourself regroup and let your mind settle before you return to what you were doing. If you struggle with anxiety or \"worry thoughts,\" imagine your mind as a blue marely and your worry thoughts as clouds.  Now imagine those worry thoughts floating across your mind's marely. Just let them pass by as you watch. Follow-up care is a key part of your treatment and safety. Be sure to make and go to all appointments, and call your doctor if you are having problems. It's also a good idea to know your test results and keep a list of the medicines you take. Where can you learn more? Go to http://www.daigle.com/ and enter M676 to learn more about \"Learning About Mindfulness for Stress. \"  Current as of: February 9, 2022               Content Version: 13.5  © 5602-6185 [employer]. Care instructions adapted under license by [facility] ([facility]). If you have questions about a medical condition or this instruction, always ask your healthcare professional. [address] any warranty or liability for your use of this information. Learning About Emotional Support  When do you need emotional support? You might find getting support from others helpful when you have a long-term health problem. Often people feel alone, confused, or scared when coping with an illness. But you aren't alone. Other people are going through the same thing you are and know how you feel. Talking with others about your feelings can help you feel better. Your family and friends can give you support. So can your doctor, a support group, or a [employer]. If you have a support network, you will not feel as alone. You will learn new ways to deal with your situation, and you may try harder to overcome it. Where you can get support  Family and friends: They can help you cope by giving you comfort and encouragement. Counseling: Professional counseling can help you cope with situations that interfere with your life and cause stress. Counseling can help you understand and deal with your illness. Your doctor: Find a doctor you trust and feel comfortable with. Be open and honest about your fears and concerns.  Your doctor can help you get the right medical treatments, including counseling. Spiritual or Gnosticism groups: They can provide comfort and may be able to help you find counseling or other social support services. Social groups: They can help you meet new people and get involved in activities you enjoy. Community support groups: In a support group, you can talk to others who have dealt with the same problems or illness as you. You can encourage one another and learn ways to cope with tough emotions. How to find a support group  Ask your doctor, counselor, or other health professional for suggestions. Contact your local Anabaptism, Confucianism, Baptism, or other Gnosticism group. Ask your family and friends. Ask people who have the same health concerns. Go online. Forums and blogs let you read messages from others and leave your own messages. You can exchange stories, vent your frustrations, and ask and answer questions. Contact a city, state, or national group that provides support for your health concerns. Your library or community center may have a list of these groups. Or you can look for information online. Look for a support group that works for you. Ask yourself if you prefer structure and would like a , or if you would like a less formal group. Do you prefer face-to-face meetings? Or do you feel more secure in online chat rooms or forums? Supportive relationships  A supportive relationship includes emotional support such as love, trust, and understanding, as well as advice and concrete help, such as help managing your time. Reach out to others  Family and friends can help you. Ask them to:  Listen to you and give you encouragement. This can keep you from feeling hopeless or alone. Help with small daily tasks or with bigger problems. A helping hand can keep you from feeling overwhelmed. Help you manage a health problem. For example, ask them to go to doctor visits with you.  Your loved ones can offer support by being involved in your medical care.  Respect your relationships  A good relationship is also a two-way street. You count on help from others, but they also count on you. Know your friends' limits. You don't have to see or call your friends every day. If you are going through a rough patch, ask friends if you can contact them outside of the usual boundaries. Don't always complain or talk about yourself. Know when it's time to stop talking and listen or just enjoy your friend's company. Know that good friends can be a bad influence. For example, if a friend encourages you to drink when you know it will harm you, you may want to end the friendship. Where can you learn more? Go to http://www.woods.com/ and enter G092 to learn more about \"Learning About Emotional Support. \"  Current as of: February 9, 2022               Content Version: 13.5  © 2006-2022 Healthwise, Embrace+. Care instructions adapted under license by [facility] ([facility]). If you have questions about a medical condition or this instruction, always ask your healthcare professional. [address] any warranty or liability for your use of this information. For more information on your local Area Agency on Aging or [place] on Aging please visit the appropriate web site below:    [place]: MobileCycles.pl    [facility]: https://aging. [place].gov/    [place]: https://aging.[place].gov/    [place]: InsuranceSquad.es           Learning About Stress  What is stress? Stress is what you feel when you have to handle more than you are used to. Stress is a fact of life for most people, and it affects everyone differently. What causes stress for you may not be stressful for someone else. A lot of things can cause stress. You may feel stress when you go on a job interview, take a test, or run a race. This kind of short-term stress is normal and even useful. It can help you if you need to work hard or react quickly. For example, stress can help you finish an important job on time. Stress also can last a long time. Long-term stress is caused by stressful situations or events. Examples of long-term stress include long-term health problems, ongoing problems at work, or conflicts in your family. Long-term stress can harm your health. How does stress affect your health? When you are stressed, your body responds as though you are in danger. It makes hormones that speed up your heart, make you breathe faster, and give you a burst of energy. This is called the fight-or-flight stress response. If the stress is over quickly, your body goes back to normal and no harm is done. But if stress happens too often or lasts too long, it can have bad effects. Long-term stress can make you more likely to get sick, and it can make symptoms of some diseases worse. If you tense up when you are stressed, you may develop neck, shoulder, or low back pain. Stress is linked to high blood pressure and heart disease. Stress also harms your emotional health. It can make you guzman, tense, or depressed. Your relationships may suffer, and you may not do well at work or school. What can you do to manage stress? How to relax your mind   Write. It may help to write about things that are bothering you. This helps you find out how much stress you feel and what is causing it. When you know this, you can find better ways to cope. Let your feelings out. Talk, laugh, cry, and express anger when you need to. Talking with friends, family, a counselor, or a member of the clergy about your feelings is a healthy way to relieve stress. Do something you enjoy. For example, listen to music or go to a movie. Practice your hobby or do volunteer work. Meditate. This can help you relax, because you are not worrying about what happened before or what may happen in the future. Do guided imagery. Imagine yourself in any setting that helps you feel calm.  You can use audiotapes, books, or a teacher to guide you. How to relax your body   Do something active. Exercise or activity can help reduce stress. Walking is a great way to get started. Even everyday activities such as housecleaning or yard work can help. Do breathing exercises. For example:  From a standing position, bend forward from the waist with your knees slightly bent. Let your arms dangle close to the floor. Breathe in slowly and deeply as you return to a standing position. Roll up slowly and lift your head last.  Hold your breath for just a few seconds in the standing position. Breathe out slowly and bend forward from the waist.  Try yoga or xiang chi. These techniques combine exercise and meditation. You may need some training at first to learn them. What can you do to prevent stress? Manage your time. This helps you find time to do the things you want and need to do. Get enough sleep. Your body recovers from the stresses of the day while you are sleeping. Get support. Your family, friends, and community can make a difference in how you experience stress. Where can you learn more? Go to http://www.daigle.com/ and enter N032 to learn more about \"Learning About Stress. \"  Current as of: October 6, 2021               Content Version: 13.5  © 2006-2022 Healthwise, [employer]. Care instructions adapted under license by [facility] [address] If you have questions about a medical condition or this instruction, always ask your healthcare professional. [place] [address] any warranty or liability for your use of this information. Learning About Managing Anger  What causes anger? Many things can cause anger: Stress at work or at home. Social situations that make you angry. A response to everyday events. Anger signals your body to prepare for a fight. This reaction is often called \"fight or flight. \" When you get angry, adrenaline and other hormones are released into your blood.  Then your blood pressure goes up, your heart beats faster, and you breathe faster. When you express anger in a healthy way, it can inspire change and make you productive. But if you don't have the skills to express anger in a healthy way, anger can build up. You may hurt others--and yourself--emotionally and even physically. Violent behavior often starts with verbal threats or fairly minor incidents. But over time, it can involve physical harm. It can include physical, verbal, or sexual abuse of an intimate partner (domestic violence), a child (child abuse), or an older adult (elder abuse). It can also make you sick. Anger and constant hostility keep your blood pressure high. They increase your chances of having another health problem, such as depression, a heart attack, or a stroke. Some people with post-traumatic stress disorder (PTSD) feel angry and on alert all the time. It may feel like there are no other ways to react when you are angry. But when you learn to work with anger in appropriate and healthy ways, your anger no longer controls you. How can you manage your anger? The first step to managing anger is to be more aware of it. Note the thoughts, feelings, and emotions that you have when you get angry. Practice noticing these signs of anger when you are calm. If you are more aware of the signs of anger, you can take steps to manage it. Here are a few tips: Think before you act. Take time to stop and cool down when you feel yourself getting angry. Count to 10 while you take slow, steady breaths. Practice some other form of mental relaxation. Learn the feelings that lead to angry outbursts. Anger and hostility may be a symptom of unhappy feelings or depression about your job, your relationship, or other aspects of your personal life. Avoid situations that lead to angry outbursts. If standing in line bothers you, do errands at less busy times. Express anger in a healthy way.  You might:  Go for a short walk or jog. Draw, paint, or listen to music to release the anger. Write in a daily journal.  Use \"I\" statements, not \"you\" statements, to discuss your anger. Say \"I don't feel valued when my needs are not being met\" instead of \"You make me mad when you are so inconsiderate. \"  Take care of yourself. Exercise regularly. Eat a variety of healthy foods. Don't skip meals. Try to get 8 hours of sleep each night. Limit your use of alcohol, and don't use drugs. Practice yoga, meditation, or xiang chi to relax. Explore other resources that may be available through your job or your community. Contact your human resources department at work. You might be able to get services through an employee assistance program.  Contact your local hospital, mental health facility, or health department. Ask what types of programs or support groups are available in your area. Do not keep guns in your home. If you must have guns in your home, unload them and lock them up. Lock ammunition in a separate place. Keep guns away from children. Where can you find help? If anger or stress starts to harm your work or personal relationships, you might seek help. You can learn ways to manage your feelings and actions. Talk to someone you trust, or find a counselor. There are groups in your area that can connect you with people to talk to. [facility] Services . This service from the national Substance Abuse and [employer]  can help you find local counselors. Search online at [employer]. samhsa.gov or call 2-327-506-HELP (271 375 549), or ZeussD 0-874.954.9834. Parents Anonymous. Self-help groups that serve parents under stress, as well as children who have been abused, are available throughout the [place], [place] ([facility]), and [facility]. To find a group in your area, search online or in your phone book under Parents Anonymous or call (212) 262-2802. Where can you learn more?   Go to http://[employer].JumpCam/ and enter Z357 to learn more about \"Learning About Managing Anger. \"  Current as of: February 9, 2022               Content Version: 13.5  © 2006-2022 Healthwise, Incorporated. Care instructions adapted under license by [facility] ([facility]). If you have questions about a medical condition or this instruction, always ask your healthcare professional. [address] any warranty or liability for your use of this information. Learning About Being Active as an Older Adult  Why is being active important as you get older? Being active is one of the best things you can do for your health. And it's never too late to start. Being active--or getting active, if you aren't already--has definite benefits. It can:  Give you more energy,  Keep your mind sharp. Improve balance to reduce your risk of falls. Help you manage chronic illness with fewer medicines. No matter how old you are, how fit you are, or what health problems you have, there is a form of activity that will work for you. And the more physical activity you can do, the better your overall health will be. What kinds of activity can help you stay healthy? Being more active will make your daily activities easier. Physical activity includes planned exercise and things you do in daily life. There are four types of activity:  Aerobic. Doing aerobic activity makes your heart and lungs strong. Includes walking, dancing, and gardening. Aim for at least 2½ hours spread throughout the week. It improves your energy and can help you sleep better. Muscle-strengthening. This type of activity can help maintain muscle and strengthen bones. Includes climbing stairs, using resistance bands, and lifting or carrying heavy loads. Aim for at least twice a week. It can help protect the knees and other joints. Stretching. Stretching gives you better range of motion in joints and muscles.   Includes upper arm stretches, calf stretches, and gentle yoga. Aim for at least twice a week, preferably after your muscles are warmed up from other activities. It can help you function better in daily life. Balancing. This helps you stay coordinated and have good posture. Includes heel-to-toe walking, xiang chi, and certain types of yoga. Aim for at least 3 days a week. It can reduce your risk of falling. Even if you have a hard time meeting the recommendations, it's better to be more active than less active. All activity done in each category counts toward your weekly total. You'd be surprised how daily things like carrying groceries, keeping up with grandchildren, and taking the stairs can add up. What keeps you from being active? If you've had a hard time being more active, you're not alone. Maybe you remember being able to do more. Or maybe you've never thought of yourself as being active. It's frustrating when you can't do the things you want. Being more active can help. What's holding you back? Getting started. Have a goal, but break it into easy tasks. Small steps build into big accomplishments. Staying motivated. If you feel like skipping your activity, remember your goal. Maybe you want to move better and stay independent. Every activity gets you one step closer. Not feeling your best.  Start with 5 minutes of an activity you enjoy. Prove to yourself you can do it. As you get comfortable, increase your time. You may not be where you want to be. But you're in the process of getting there. Everyone starts somewhere. How can you find safe ways to stay active? Talk with your doctor about any physical challenges you're facing. Make a plan with your doctor if you have a health problem or aren't sure how to get started with activity. If you're already active, ask your doctor if there is anything you should change to stay safe as your body and health change.   If you tend to feel dizzy after you take medicine, avoid activity at that time. Try being active before you take your medicine. This will reduce your risk of falls. If you plan to be active at home, make sure to clear your space before you get started. Remove things like TV cords, coffee tables, and throw rugs. It's safest to have plenty of space to move freely. The key to getting more active is to take it slow and steady. Try to improve only a little bit at a time. Pick just one area to improve on at first. And if an activity hurts, stop and talk to your doctor. Where can you learn more? Go to http://www.daigle.com/ and enter P600 to learn more about \"Learning About Being Active as an Older Adult. \"  Current as of: October 10, 2022               Content Version: 13.5  © 3316-6418 Healthwise, Incorporated. Care instructions adapted under license by [facility] ([facility]). If you have questions about a medical condition or this instruction, always ask your healthcare professional. [place] [address] any warranty or liability for your use of this information. Learning About Dental Care for Older Adults  Dental care for older adults: Overview  Dental care for older people is much the same as for younger adults. But older adults do have concerns that younger adults do not. Older adults may have problems with gum disease and decay on the roots of their teeth. They may need missing teeth replaced or broken fillings fixed. Or they may have dentures that need to be cared for. Some older adults may have trouble holding a toothbrush. You can help remind the person you are caring for to brush and floss their teeth or to clean their dentures. In some cases, you may need to do the brushing and other dental care tasks. People who have trouble using their hands or who have dementia may need this extra help. How can you help with dental care?   Normal dental care  To keep the teeth and gums healthy:  Brush the teeth with fluoride toothpaste twice a day--in the morning and at night--and floss at least once a day. Plaque can quickly build up on the teeth of older adults. Watch for the signs of gum disease. These signs include gums that bleed after brushing or after eating hard foods, such as apples. See a dentist regularly. Many experts recommend checkups every 6 months. Keep the dentist up to date on any new medications the person is taking. Encourage a balanced diet that includes whole grains, vegetables, and fruits, and that is low in saturated fat and sodium. Encourage the person you're caring for not to use tobacco products. They can affect dental and general health. Many older adults have a fixed income and feel that they can't afford dental care. But most towns and cities have programs in which dentists help older adults by lowering fees. Contact your area's public health offices or  for information about dental care in your area. Using a toothbrush  Older adults with arthritis sometimes have trouble brushing their teeth because they can't easily hold the toothbrush. Their hands and fingers may be stiff, painful, or weak. If this is the case, you can: Offer an electric toothbrush. Enlarge the handle of a non-electric toothbrush by wrapping a sponge, an elastic bandage, or adhesive tape around it. Push the toothbrush handle through a ball made of rubber or soft foam.  Make the handle longer and thicker by taping Popsicle sticks or tongue depressors to it. You may also be able to buy special toothbrushes, toothpaste dispensers, and floss holders. Your doctor may recommend a soft-bristle toothbrush if the person you care for bleeds easily. Bleeding can happen because of a health problem or from certain medicines. A toothpaste for sensitive teeth may help if the person you care for has sensitive teeth. How do you brush and floss someone's teeth?   If the person you are caring for has a hard time cleaning their teeth on their own, you may need to brush and floss their teeth for them. It may be easiest to have the person sit and face away from you, and to sit or stand behind them. That way you can steady their head against your arm as you reach around to floss and brush their teeth. Choose a place that has good lighting and is comfortable for both of you. Before you begin, gather your supplies. You will need gloves, floss, a toothbrush, and a container to hold water if you are not near a sink. Wash and dry your hands well and put on gloves. Start by flossing:  Gently work a piece of floss between each of the teeth toward the gums. A plastic flossing tool may make this easier, and they are available at most [facility]. Curve the floss around each tooth into a U-shape and gently slide it under the gum line. Move the floss firmly up and down several times to scrape off the plaque. After you've finished flossing, throw away the used floss and begin brushing:  Wet the brush and apply toothpaste. Place the brush at a 45-degree angle where the teeth meet the gums. Press firmly, and move the brush in small circles over the surface of the teeth. Be careful not to brush too hard. Vigorous brushing can make the gums pull away from the teeth and can scratch the tooth enamel. Brush all surfaces of the teeth, on the tongue side and on the cheek side. Pay special attention to the front teeth and all surfaces of the back teeth. Brush chewing surfaces with short back-and-forth strokes. After you've finished, help the person rinse the remaining toothpaste from their mouth. Where can you learn more? Go to http://www.woods.com/ and enter F944 to learn more about \"Learning About Dental Care for Older Adults. \"  Current as of: June 16, 2022               Content Version: 13.5  © 7921-8026 Healthwise, Incorporated. Care instructions adapted under license by [facility] ([facility]).  If you have questions about a medical condition or this instruction, always ask your healthcare professional. [place] [address] any warranty or liability for your use of this information. Hearing Loss: Care Instructions  Overview     Hearing loss is a sudden or slow decrease in how well you hear. It can range from mild to severe. Permanent hearing loss can occur with aging. It also can happen when you are exposed long-term to loud noise. Examples include listening to loud music, riding motorcycles, or being around other loud machines. Hearing loss can affect your work and home life. It can make you feel lonely or depressed. You may feel that you have lost your independence. But hearing aids and other devices can help you hear better and feel connected to others. Follow-up care is a key part of your treatment and safety. Be sure to make and go to all appointments, and call your doctor if you are having problems. It's also a good idea to know your test results and keep a list of the medicines you take. How can you care for yourself at home? Avoid loud noises whenever possible. This helps keep your hearing from getting worse. Always wear hearing protection around loud noises. Wear a hearing aid as directed. See a professional who can help you pick a hearing aid that fits you. Have hearing tests as your doctor suggests. They can show whether your hearing has changed. Your hearing aid may need to be adjusted. Use other devices as needed. These may include:  Telephone amplifiers and hearing aids that can connect to a television, stereo, radio, or microphone. Devices that use lights or vibrations. These alert you to the doorbell, a ringing telephone, or a baby monitor. Television closed-captioning. This shows the words at the bottom of the screen. Most new TVs can do this. TTY (text telephone). This lets you type messages back and forth on the telephone instead of talking or listening. These devices are also called TDD.  When messages are typed on the keyboard, they are sent over the phone line to a receiving TTY. The message is shown on a monitor. Use text messaging, social media, and email if it is hard for you to communicate by telephone. Try to learn a listening technique called speechreading. It is not lipreading. You pay attention to people's gestures, expressions, posture, and tone of voice. These clues can help you understand what a person is saying. Face the person you are talking to, and have them face you. Make sure the lighting is good. You need to see the other person's face clearly. Think about counseling if you need help to adjust to your hearing loss. When should you call for help? Watch closely for changes in your health, and be sure to contact your doctor if:    You think your hearing is getting worse.     You have new symptoms, such as dizziness or nausea. Where can you learn more? Go to http://www.daigle.com/ and enter R798 to learn more about \"Hearing Loss: Care Instructions. \"  Current as of: May 4, 2022               Content Version: 13.5  © 0041-2623 Healthwise, Incorporated. Care instructions adapted under license by [facility] ([facility]). If you have questions about a medical condition or this instruction, always ask your healthcare professional. [address] any warranty or liability for your use of this information. Learning About Vision Tests  What are vision tests? The four most common vision tests are visual acuity tests, refraction, visual field tests, and color vision tests. Visual acuity (sharpness) tests  These tests are used: To see if you need glasses or contact lenses. To monitor an eye problem. To check an eye injury. Visual acuity tests are done as part of routine exams. You may also have this test when you get your 's license or apply for some types of jobs. Visual field tests  These tests are used:   To check for vision loss in any area of your range of vision. To screen for certain eye diseases. To look for nerve damage after a stroke, head injury, or other problem that could reduce blood flow to the brain. Refraction and color tests  A refraction test is done to find the right prescription for glasses and contact lenses. A color vision test is done to check for color blindness. Color vision is often tested as part of a routine exam. You may also have this test when you apply for a job where recognizing different colors is important, such as , electronics, or the [employer]. How are vision tests done? Visual acuity test   You cover one eye at a time. You read aloud from a wall chart across the room. You read aloud from a small card that you hold in your hand. Refraction   You look into a special device. The device puts lenses of different strengths in front of each eye to see how strong your glasses or contact lenses need to be. Visual field tests   Your doctor may have you look through special machines. Or your doctor may simply have you stare straight ahead while they move a finger into and out of your field of vision. Color vision test   You look at pieces of printed test patterns in various colors. You say what number or symbol you see. Your doctor may have you trace the number or symbol using a pointer. How do these tests feel? There is very little chance of having a problem from this test. If dilating drops are used for a vision test, they may make the eyes sting and cause a medicine taste in the mouth. Follow-up care is a key part of your treatment and safety. Be sure to make and go to all appointments, and call your doctor if you are having problems. It's also a good idea to know your test results and keep a list of the medicines you take. Where can you learn more? Go to http://www.daigle.com/ and enter G551 to learn more about \"Learning About Vision Tests. \"  Current as of: October 12, 0040               NPDYWJC Version: 13.5  © 6821-3640 Healthwise, [employer]. Care instructions adapted under license by [facility] ([facility]). If you have questions about a medical condition or this instruction, always ask your healthcare professional. [address] any warranty or liability for your use of this information. Learning About Activities of Daily Living  What are activities of daily living? Activities of daily living (ADLs) are the basic self-care tasks you do every day. As you age, and if you have health problems, you may find that it's harder to do these things for yourself. That's when you may need some help. Your doctor uses ADLs to measure how much help you need. Knowing what you can and can't do for yourself is an important first step to getting help. And when you have the help you need, you can stay as independent as possible. Your doctor will want to know if you are able to do tasks such as: Take a bath or shower without help. Go to the bathroom by yourself. Dress and undress without help. Shave, comb your hair, and brush teeth on your own. Get in and out of bed or a chair without help. Feed yourself without help. If you are having trouble doing basic self-care tasks, talk with your doctor. You may want to bring a caregiver or family member who can help the doctor understand your needs and abilities. How will a doctor assess your ADLs? Asking about ADLs is part of a routine health checkup your doctor will likely do as you age. Your health check might be done in a doctor's office, in your home, or at a hospital. The goal is to find out if you are having any problems that could make your health problems worse or that make it unsafe for you to be on your own. To measure your ADLs, your doctor will ask how hard it is for you to do routine tasks. He or she may also want to know if you have changed the way you do a task because of a health problem.  He or she may watch how you:  Walk back and forth. Keep your balance while you stand or walk. Move from sitting to standing or from a bed to a chair. Button or unbutton a shirt or sweater. Remove and put on your shoes. It's normal to feel a little worried or anxious if you find you can't do all the things you used to be able to do. Talking with your doctor about ADLs isn't a test that you either pass or fail. It's just a way to get more information about your health and safety. Follow-up care is a key part of your treatment and safety. Be sure to make and go to all appointments, and call your doctor if you are having problems. It's also a good idea to know your test results and keep a list of the medicines you take. Current as of: October 6, 2021               Content Version: 13.5  © 2006-2022 Healthwise, Incorporated. Care instructions adapted under license by [facility] ([facility]). If you have questions about a medical condition or this instruction, always ask your healthcare professional. [place] [address] any warranty or liability for your use of this information. Advance Directives: Care Instructions  Overview  An advance directive is a legal way to state your wishes at the end of your life. It tells your family and your doctor what to do if you can't say what you want. There are two main types of advance directives. You can change them any time your wishes change. Living will. This form tells your family and your doctor your wishes about life support and other treatment. The form is also called a declaration. Medical power of . This form lets you name a person to make treatment decisions for you when you can't speak for yourself. This person is called a health care agent (health care proxy, health care surrogate). The form is also called a durable power of  for health care.   If you do not have an advance directive, decisions about your medical care may be made by a family member, or by a doctor or a  who doesn't know you. It may help to think of an advance directive as a gift to the people who care for you. If you have one, they won't have to make tough decisions by themselves. For more information, including forms for your state, see the [address] website (www.caringinfo.org/planning/advance-directives/). Follow-up care is a key part of your treatment and safety. Be sure to make and go to all appointments, and call your doctor if you are having problems. It's also a good idea to know your test results and keep a list of the medicines you take. What should you include in an advance directive? Many states have a unique advance directive form. (It may ask you to address specific issues.) Or you might use a universal form that's approved by many states. If your form doesn't tell you what to address, it may be hard to know what to include in your advance directive. Use the questions below to help you get started. Who do you want to make decisions about your medical care if you are not able to? What life-support measures do you want if you have a serious illness that gets worse over time or can't be cured? What are you most afraid of that might happen? (Maybe you're afraid of having pain, losing your independence, or being kept alive by machines.)  Where would you prefer to die? (Your home? A hospital? A nursing home?)  Do you want to donate your organs when you die? Do you want certain Hindu practices performed before you die? When should you call for help? Be sure to contact your doctor if you have any questions. Where can you learn more? Go to http://www.daigle.com/ and enter R264 to learn more about \"Advance Directives: Care Instructions. \"  Current as of: June 16, 2022               Content Version: 13.5  © 4329-3453 Healthwise, Incorporated. Care instructions adapted under license by [facility] ([facility]).  If you have questions about a medical condition or this instruction, always ask your healthcare professional. [place] [address] any warranty or liability for your use of this information. A Healthy Heart: Care Instructions  Your Care Instructions     Coronary artery disease, also called heart disease, occurs when a substance called plaque builds up in the vessels that supply oxygen-rich blood to your heart muscle. This can narrow the blood vessels and reduce blood flow. A heart attack happens when blood flow is completely blocked. A high-fat diet, smoking, and other factors increase the risk of heart disease. Your doctor has found that you have a chance of having heart disease. You can do lots of things to keep your heart healthy. It may not be easy, but you can change your diet, exercise more, and quit smoking. These steps really work to lower your chance of heart disease. Follow-up care is a key part of your treatment and safety. Be sure to make and go to all appointments, and call your doctor if you are having problems. It's also a good idea to know your test results and keep a list of the medicines you take. How can you care for yourself at home? Diet    Use less salt when you cook and eat. This helps lower your blood pressure. Taste food before salting. Add only a little salt when you think you need it. With time, your taste buds will adjust to less salt.     Eat fewer snack items, fast foods, canned soups, and other high-salt, high-fat, processed foods.     Read food labels and try to avoid saturated and trans fats. They increase your risk of heart disease by raising cholesterol levels.     Limit the amount of solid fat-butter, margarine, and shortening-you eat. Use olive, peanut, or canola oil when you cook. Bake, broil, and steam foods instead of frying them.     Eat a variety of fruit and vegetables every day. Dark green, deep orange, red, or yellow fruits and vegetables are especially good for you.  Examples include spinach, carrots, peaches, and berries.     Foods high in fiber can reduce your cholesterol and provide important vitamins and minerals. High-fiber foods include whole-grain cereals and breads, oatmeal, beans, brown rice, citrus fruits, and apples.     Eat lean proteins. Heart-healthy proteins include seafood, lean meats and poultry, eggs, beans, peas, nuts, seeds, and soy products.     Limit drinks and foods with added sugar. These include candy, desserts, and soda pop. Lifestyle changes    If your doctor recommends it, get more exercise. Walking is a good choice. Bit by bit, increase the amount you walk every day. Try for at least 30 minutes on most days of the week. You also may want to swim, bike, or do other activities.     Do not smoke. If you need help quitting, talk to your doctor about stop-smoking programs and medicines. These can increase your chances of quitting for good. Quitting smoking may be the most important step you can take to protect your heart. It is never too late to quit.     Limit alcohol to 2 drinks a day for men and 1 drink a day for women. Too much alcohol can cause health problems.     Manage other health problems such as diabetes, high blood pressure, and high cholesterol. If you think you may have a problem with alcohol or drug use, talk to your doctor. Medicines    Take your medicines exactly as prescribed. Call your doctor if you think you are having a problem with your medicine.     If your doctor recommends aspirin, take the amount directed each day. Make sure you take aspirin and not another kind of pain reliever, such as acetaminophen (Tylenol). When should you call for help? Call 911 if you have symptoms of a heart attack. These may include:    Chest pain or pressure, or a strange feeling in the chest.     Sweating.     Shortness of breath.     Pain, pressure, or a strange feeling in the back, neck, jaw, or upper belly or in one or both shoulders or arms.     Lightheadedness or sudden weakness.   A fast or irregular heartbeat. After you call 911, the  may tell you to chew 1 adult-strength or 2 to 4 low-dose aspirin. Wait for an ambulance. Do not try to drive yourself. Watch closely for changes in your health, and be sure to contact your doctor if you have any problems. Where can you learn more? Go to http://www.daigle.com/ and enter F075 to learn more about \"A Healthy Heart: Care Instructions. \"  Current as of: September 7, 2022               Content Version: 13.5  © 3416-7805 Healthwise, [employer]. Care instructions adapted under license by [facility] ([facility]). If you have questions about a medical condition or this instruction, always ask your healthcare professional. [address] any warranty or liability for your use of this information. Personalized Preventive Plan for Kasey Cardona - 2/7/2023  Medicare offers a range of preventive health benefits. Some of the tests and screenings are paid in full while other may be subject to a deductible, co-insurance, and/or copay. Some of these benefits include a comprehensive review of your medical history including lifestyle, illnesses that may run in your family, and various assessments and screenings as appropriate. After reviewing your medical record and screening and assessments performed today your provider may have ordered immunizations, labs, imaging, and/or referrals for you. A list of these orders (if applicable) as well as your Preventive Care list are included within your After Visit Summary for your review. Other Preventive Recommendations:    A preventive eye exam performed by an eye specialist is recommended every 1-2 years to screen for glaucoma; cataracts, macular degeneration, and other eye disorders. A preventive dental visit is recommended every 6 months. Try to get at least 150 minutes of exercise per week or 10,000 steps per day on a pedometer .   Order or download the FREE \"Exercise & Physical Activity: Your Everyday Guide\" from [employer]. Call 0-532.975.8959 or search [employer] online. You need 3922-4214 mg of calcium and 2876-5943 IU of vitamin D per day. It is possible to meet your calcium requirement with diet alone, but a vitamin D supplement is usually necessary to meet this goal.  When exposed to the sun, use a sunscreen that protects against both UVA and UVB radiation with an SPF of 30 or greater. Reapply every 2 to 3 hours or after sweating, drying off with a towel, or swimming. Always wear a seat belt when traveling in a car. Always wear a helmet when riding a bicycle or motorcycle.

## 2023-02-07 NOTE — PROGRESS NOTES
Medicare Annual Wellness Visit    Angel Mark is here for Medicare AWV    Assessment & Plan     Initial Medicare annual wellness visit    Uncontrolled type 2 diabetes mellitus with hyperglycemia ([address]  - Chronic, uncontrolled  - Admits since his wife  he \"just doesn't care\" about this own health  - No FSBS readings brought in today  - Check A1c to see status of DM  -     insulin glargine (BASAGLAR KWIKPEN) 100 UNIT/ML injection pen; INJECT 20 UNITS SUB-Q TWICE DAILY, Disp-30 mL, R-0Normal  -     Hemoglobin A1C; Future  -     Comprehensive Metabolic Panel; Future  -     LIPID PANEL; Future  -     Dulaglutide (TRULICITY) 9.27 TV/9.4SY SOPN; 0.75 mg by Subcuticular route once a week, Disp-2 mL, R-5Normal    Essential hypertension  - Normotensive  - he has met JNC standards.  - Continue current regimen. -     potassium chloride (KLOR-CON) 10 MEQ extended release tablet; Take 1 tablet by mouth daily, Disp-30 tablet, R-11Normal  -     Hemoglobin A1C; Future  -     Comprehensive Metabolic Panel; Future  -     LIPID PANEL; Future    Mixed hyperlipidemia  -     simvastatin (ZOCOR) 40 MG tablet; Take 1 tablet by mouth nightly, Disp-30 tablet, R-11Normal  -     LIPID PANEL; Future    Bipolar affective disorder, currently depressed, moderate (HCC)  -     QUEtiapine (SEROQUEL) 400 MG tablet; TAKE ONE TABLET BY MOUTH DAILY IN THE EVENING., Disp-30 tablet, R-11Normal    Recurrent major depressive episodes (HCC)  - Chronic, uncontrolled  - Declines referral  -     buPROPion (WELLBUTRIN SR) 100 MG extended release tablet; Take 1 tablet by mouth 2 times daily, Disp-60 tablet, R-11Normal    Posttraumatic stress disorder  -     buPROPion (WELLBUTRIN SR) 100 MG extended release tablet; Take 1 tablet by mouth 2 times daily, Disp-60 tablet, R-11Normal    Action tremor  -     propranolol (INDERAL LA) 60 MG extended release capsule;  Take 1 capsule by mouth daily, Disp-30 capsule, R-11Normal    Benign prostatic hyperplasia with urinary frequency  -     tamsulosin (FLOMAX) 0.4 MG capsule; Take 1 capsule by mouth daily, Disp-30 capsule, R-11Normal    Flu vaccine need  -     Influenza, FLUAD, (age 65 y+), IM, Preservative Free, 0.5 mL    Need for pneumococcal vaccine  -     Pneumococcal, PCV-13, PREVNAR 13, (age 6 wks+), IM      Recommendations for Preventive Services Due: see orders and patient instructions/AVS.  Recommended screening schedule for the next 5-10 years is provided to the patient in written form: see Patient Instructions/AVS.     Return for Medicare Annual Wellness Visit in 1 year.     Subjective   Review of Systems   Constitutional:  Positive for fatigue. Negative for chills and fever.   Respiratory: Negative.     Cardiovascular: Negative.    Gastrointestinal: Negative.    Genitourinary: Negative.    Skin: Negative.    Neurological: Negative.    Psychiatric/Behavioral:  Positive for dysphoric mood.        Patient's complete Health Risk Assessment and screening values have been reviewed and are found in Flowsheets. The following problems were reviewed today and where indicated follow up appointments were made and/or referrals ordered.    Positive Risk Factor Screenings with Interventions:    Fall Risk:  Do you feel unsteady or are you worried about falling? : (!) yes  2 or more falls in past year?: no  Fall with injury in past year?: no     Interventions:    See AVS for additional education material     Depression:  PHQ-2 Score: 5  PHQ-9 Total Score: 16    Interpretation:   1-4 = minimal  5-9 = mild  10-14 = moderate  15-19 = moderately severe  20-27 = severe  Interventions:  Patient declines any further evaluation or treatment          General HRA Questions:  Select all that apply: (!) Loneliness, Social Isolation, Stress, Anger    Loneliness Interventions:  Patient declined any further interventions or treatment    Social Isolation Interventions:  Patient declined any further interventions or treatment    Stress  Interventions:  Patient declined any further interventions or treatment    Anger Interventions:  Patient declined any further interventions or treatment      Social and Emotional Support:  Do you get the social and emotional support that you need?: (!) No  Interventions:  Patient declined any further interventions or treatment    Weight and Activity:  Physical Activity: Inactive    Days of Exercise per Week: 0 days    Minutes of Exercise per Session: 0 min     On average, how many days per week do you engage in moderate to strenuous exercise (like a brisk walk)?: 0 days  Have you lost any weight without trying in the past 3 months?: (!) Yes  Body mass index: 22.59      Inactivity Interventions:  Patient declined any further interventions or treatment    Unintentional Weight Loss Interventions:  Check labs, likely due to uncontrolled DM, poor mood      Dentist Screen:  Have you seen the dentist within the past year?: (!) No    Intervention:  Advised to schedule with their dentist     Vision Screen:  Do you have difficulty driving, watching TV, or doing any of your daily activities because of your eyesight?: (!) Yes  Have you had an eye exam within the past year?: (!) No  No results found.     Interventions:   Patient encouraged to make appointment with their eye specialist    Safety:  Do you have working smoke detectors?: (!) No  Do you always fasten your seatbelt when you are in a car?: (!) No  Interventions:  See AVS for additional education material    ADL's:   Patient reports needing help with:  Select all that apply: Ronal Hopping, Housekeeping, Transportation  Interventions:  See AVS for additional education material    Advanced Directives:  Do you have a Living Will?: (!) No    Intervention:  Information offered        Tobacco Use:  Tobacco Use: Medium Risk    Smoking Tobacco Use: Former    Smokeless Tobacco Use: Never    Passive Exposure: Not on file     E-cigarette/Vaping       Questions Responses E-cigarette/Vaping Use Current Every Day User    Start Date     Passive Exposure     Quit Date     Counseling Given     Comments Mid-Size or Vape Pen          Interventions:  Patient declined any further intervention or treatment                        Objective   Vitals:    02/07/23 1411   BP: 138/80   Pulse: 94   SpO2: 95%   Weight: 162 lb (73.5 kg)   Height: 5' 11\" (1.803 m)      Body mass index is 22.59 kg/m². Gen: NAD, sitting in wheelchair  Eyes: Right eye closed, no icterus, no conjunctival erythema  CV: RRR, no mrgs  Resp: CTAB  Abd: soft, NTTP  Neuro: alert, oriented, answers questions appropriately  MSK: no peripheral edema  Skin: warm, dry  Psych: Depressed, angry mood, affect         Allergies   Allergen Reactions    Metoclopramide      Other reaction(s): Orthostatic HTN    No Known Allergies      Per ER 12/28/01     Prior to Visit Medications    Medication Sig Taking? Authorizing Provider   Continuous Blood Gluc Sensor (NuveSTYLE MILLIE 14 DAY SENSOR) [facility] USE FOR CONTINUOUS GLUCOSE MONITORING. CHANGE SENSOR EVERY 14 DAYS Yes YULIA Cr CNP   insulin glargine (BASAGLAR KWIKPEN) 100 UNIT/ML injection pen INJECT 20 UNITS SUB-Q TWICE DAILY Yes YULIA Cr CNP   insulin aspart (NOVOLOG FLEXPEN) 100 UNIT/ML injection pen Up to 100units Tid Yes YULIA Cr CNP   Insulin Aspart FlexPen 100 UNIT/ML SOPN INJECT UP  UNITS SUB-Q 3 TIMES A DAY AS DIRECTED Yes YULIA Cr CNP   Meals on Wheels MIS by Does not apply route Diabetic diet Yes YULIA Cr CNP   ASPIRIN LOW DOSE 81 MG EC tablet TAKE ONE TABLET BY MOUTH EVERY DAY Yes YULIA Cr CNP   vitamin D (VITAMIN D3 ULTRA STRENGTH) 125 MCG (5000 UT) CAPS capsule Take 5,000 Units by mouth daily 2 x a week Yes Historical Provider, MD   QUEtiapine (SEROQUEL) 400 MG tablet TAKE ONE TABLET BY MOUTH DAILY IN THE EVENING.  Yes YULIA Cr CNP   metFORMIN ([facility]) 500 MG extended release tablet Take 4 tablets by mouth daily (with breakfast) Yes YULIA Aiken CNP   tamsulosin (FLOMAX) 0.4 MG capsule Take 1 capsule by mouth daily Yes YULIA Aiken CNP   simvastatin (ZOCOR) 40 MG tablet Take 1 tablet by mouth nightly Yes YULIA Aiken CNP   buPROPion [facility] SR) 100 MG extended release tablet Take 1 tablet by mouth 2 times daily Yes YULIA Aiken CNP   potassium chloride (KLOR-CON) 10 MEQ extended release tablet Take 1 tablet by mouth daily Yes YULIA Aiken CNP   propranolol (INDERAL LA) 60 MG extended release capsule Take 1 capsule by mouth daily Yes YULIA Aiken CNP   primidone (MYSOLINE) 50 MG tablet Take 1 tablet by mouth daily Yes YULIA Aiken CNP   Insulin Pen Needle (PEN NEEDLES 5/16\") 30G X 8 MM MISC 1 each by Does not apply route 5 times daily Yes YULIA Long CNP   Continuous Blood Gluc  (FREESTYLE MILLIE 14 DAY READER) REEMA 1 Units by Does not apply route as needed (as needed) Yes YULIA Long CNP   Continuous Blood Gluc  (FREESTYLE MILLIE 14 DAY READER) [address] 1 Units by Does not apply route as needed (as needed) Yes YULIA Long CNP   Continuous Blood Gluc  (FREESTYLE MILLIE 14 DAY READER) REEMA 1 Units by Does not apply route as needed (as needed) Yes YULIA Long CNP   Melatonin 10 MG CAPS Take 80 mg by mouth nightly Pt taking 100 mg at bed time Yes Historical Provider, MD   diphenhydrAMINE (BENADRYL) 25 MG tablet Take 50 mg by mouth every 3-4 hours as needed for Allergies or Sleep  Yes Historical Provider, MD       [facility] (Including outside providers/suppliers regularly involved in providing care):   Patient Care Team:  YULIA Aiken CNP as PCP - General (Nurse Practitioner)  YULIA Aiken CNP as PCP - Empaneled Provider  Ema Looney RN as Nurse Navigator  Dominick Guillen RN as Nurse Navigator     Reviewed and updated this visit:  Allergies  Meds              Simone Bryant, APRN - CNP

## 2023-02-08 LAB
ESTIMATED AVERAGE GLUCOSE: 283.4 MG/DL
HBA1C MFR BLD: 11.5 %

## 2023-02-10 LAB
A/G RATIO: 1.3 (ref 1.1–2.2)
ALBUMIN SERPL-MCNC: 4.1 G/DL (ref 3.4–5)
ALP BLD-CCNC: 85 U/L (ref 40–129)
ALT SERPL-CCNC: 9 U/L (ref 10–40)
ANION GAP SERPL CALCULATED.3IONS-SCNC: 14 MMOL/L (ref 3–16)
AST SERPL-CCNC: 13 U/L (ref 15–37)
BILIRUB SERPL-MCNC: <0.2 MG/DL (ref 0–1)
BUN BLDV-MCNC: 18 MG/DL (ref 7–20)
CALCIUM SERPL-MCNC: 9.5 MG/DL (ref 8.3–10.6)
CHLORIDE BLD-SCNC: 99 MMOL/L (ref 99–110)
CO2: 25 MMOL/L (ref 21–32)
CREAT SERPL-MCNC: 0.7 MG/DL (ref 0.8–1.3)
GFR SERPL CREATININE-BSD FRML MDRD: >60 ML/MIN/{1.73_M2}
GLUCOSE BLD-MCNC: 186 MG/DL (ref 70–99)
POTASSIUM SERPL-SCNC: 4.2 MMOL/L (ref 3.5–5.1)
SODIUM BLD-SCNC: 138 MMOL/L (ref 136–145)
TOTAL PROTEIN: 7.3 G/DL (ref 6.4–8.2)

## 2023-02-15 ASSESSMENT — ENCOUNTER SYMPTOMS
RESPIRATORY NEGATIVE: 1
GASTROINTESTINAL NEGATIVE: 1

## 2023-03-03 DIAGNOSIS — E11.65 UNCONTROLLED TYPE 2 DIABETES MELLITUS WITH HYPERGLYCEMIA (HCC): ICD-10-CM

## 2023-03-03 RX ORDER — INSULIN GLARGINE 100 [IU]/ML
INJECTION, SOLUTION SUBCUTANEOUS
Qty: 30 ML | Refills: 2 | Status: SHIPPED | OUTPATIENT
Start: 2023-03-03

## 2023-03-06 DIAGNOSIS — E11.65 UNCONTROLLED TYPE 2 DIABETES MELLITUS WITH HYPERGLYCEMIA (HCC): ICD-10-CM

## 2023-03-07 RX ORDER — INSULIN ASPART 100 [IU]/ML
INJECTION, SOLUTION INTRAVENOUS; SUBCUTANEOUS
Qty: 5 ADJUSTABLE DOSE PRE-FILLED PEN SYRINGE | Refills: 1 | Status: SHIPPED | OUTPATIENT
Start: 2023-03-07 | End: 2023-04-11

## 2023-04-11 DIAGNOSIS — E11.65 UNCONTROLLED TYPE 2 DIABETES MELLITUS WITH HYPERGLYCEMIA (HCC): ICD-10-CM

## 2023-04-11 RX ORDER — INSULIN ASPART 100 [IU]/ML
INJECTION, SOLUTION INTRAVENOUS; SUBCUTANEOUS
Qty: 5 ADJUSTABLE DOSE PRE-FILLED PEN SYRINGE | Refills: 1 | Status: SHIPPED | OUTPATIENT
Start: 2023-04-11 | End: 2023-05-09

## 2023-05-08 DIAGNOSIS — E11.65 UNCONTROLLED TYPE 2 DIABETES MELLITUS WITH HYPERGLYCEMIA (HCC): ICD-10-CM

## 2023-05-09 RX ORDER — INSULIN ASPART 100 [IU]/ML
INJECTION, SOLUTION INTRAVENOUS; SUBCUTANEOUS
Qty: 5 ADJUSTABLE DOSE PRE-FILLED PEN SYRINGE | Refills: 1 | Status: SHIPPED | OUTPATIENT
Start: 2023-05-09 | End: 2023-06-12

## 2023-05-10 ENCOUNTER — TELEPHONE (OUTPATIENT)
Dept: INTERNAL MEDICINE CLINIC | Age: 68
End: 2023-05-10

## 2023-05-11 DIAGNOSIS — E11.65 UNCONTROLLED TYPE 2 DIABETES MELLITUS WITH HYPERGLYCEMIA (HCC): Primary | ICD-10-CM

## 2023-06-10 DIAGNOSIS — E11.65 UNCONTROLLED TYPE 2 DIABETES MELLITUS WITH HYPERGLYCEMIA (HCC): ICD-10-CM

## 2023-06-12 RX ORDER — INSULIN ASPART 100 [IU]/ML
INJECTION, SOLUTION INTRAVENOUS; SUBCUTANEOUS
Qty: 30 ML | Refills: 0 | Status: SHIPPED | OUTPATIENT
Start: 2023-06-12 | End: 2023-07-28

## 2023-06-14 ENCOUNTER — OFFICE VISIT (OUTPATIENT)
Dept: INTERNAL MEDICINE CLINIC | Age: 68
End: 2023-06-14
Payer: COMMERCIAL

## 2023-06-14 VITALS
DIASTOLIC BLOOD PRESSURE: 68 MMHG | OXYGEN SATURATION: 95 % | HEIGHT: 71 IN | SYSTOLIC BLOOD PRESSURE: 128 MMHG | BODY MASS INDEX: 22.26 KG/M2 | WEIGHT: 159 LBS | HEART RATE: 68 BPM

## 2023-06-14 DIAGNOSIS — E11.9 TYPE 2 DIABETES MELLITUS WITHOUT COMPLICATION, WITH LONG-TERM CURRENT USE OF INSULIN (HCC): ICD-10-CM

## 2023-06-14 DIAGNOSIS — H61.23 BILATERAL IMPACTED CERUMEN: Primary | ICD-10-CM

## 2023-06-14 DIAGNOSIS — Z79.4 TYPE 2 DIABETES MELLITUS WITHOUT COMPLICATION, WITH LONG-TERM CURRENT USE OF INSULIN (HCC): ICD-10-CM

## 2023-06-14 DIAGNOSIS — J43.2 CENTRILOBULAR EMPHYSEMA (HCC): ICD-10-CM

## 2023-06-14 DIAGNOSIS — E11.65 UNCONTROLLED TYPE 2 DIABETES MELLITUS WITH HYPERGLYCEMIA (HCC): ICD-10-CM

## 2023-06-14 PROCEDURE — 3078F DIAST BP <80 MM HG: CPT | Performed by: NURSE PRACTITIONER

## 2023-06-14 PROCEDURE — 1123F ACP DISCUSS/DSCN MKR DOCD: CPT | Performed by: NURSE PRACTITIONER

## 2023-06-14 PROCEDURE — 99214 OFFICE O/P EST MOD 30 MIN: CPT | Performed by: NURSE PRACTITIONER

## 2023-06-14 PROCEDURE — 3074F SYST BP LT 130 MM HG: CPT | Performed by: NURSE PRACTITIONER

## 2023-06-14 PROCEDURE — 69210 REMOVE IMPACTED EAR WAX UNI: CPT | Performed by: NURSE PRACTITIONER

## 2023-06-14 PROCEDURE — 3046F HEMOGLOBIN A1C LEVEL >9.0%: CPT | Performed by: NURSE PRACTITIONER

## 2023-06-14 RX ORDER — INSULIN GLARGINE 100 [IU]/ML
INJECTION, SOLUTION SUBCUTANEOUS
Qty: 30 ML | Refills: 2 | Status: SHIPPED | OUTPATIENT
Start: 2023-06-14

## 2023-06-14 RX ORDER — PEN NEEDLE, DIABETIC 30 GX5/16"
1 NEEDLE, DISPOSABLE MISCELLANEOUS
Qty: 200 EACH | Refills: 3 | Status: SHIPPED | OUTPATIENT
Start: 2023-06-14

## 2023-06-27 ASSESSMENT — ENCOUNTER SYMPTOMS
GASTROINTESTINAL NEGATIVE: 1
RESPIRATORY NEGATIVE: 1

## 2023-07-28 DIAGNOSIS — E11.65 UNCONTROLLED TYPE 2 DIABETES MELLITUS WITH HYPERGLYCEMIA (HCC): ICD-10-CM

## 2023-07-28 RX ORDER — INSULIN ASPART 100 [IU]/ML
INJECTION, SOLUTION INTRAVENOUS; SUBCUTANEOUS
Qty: 5 ADJUSTABLE DOSE PRE-FILLED PEN SYRINGE | Refills: 5 | Status: SHIPPED | OUTPATIENT
Start: 2023-07-28

## 2023-07-28 NOTE — TELEPHONE ENCOUNTER
Pt called back and stated that 1 box of 5 should be okay. Stated that the Garcia Rape is working quite well. Also wanted to check on the basaglar. States that he is almost out of these as well.

## 2023-08-24 RX ORDER — ASPIRIN 81 MG/1
TABLET ORAL
Qty: 90 TABLET | Refills: 2 | Status: SHIPPED | OUTPATIENT
Start: 2023-08-24

## 2023-09-12 DIAGNOSIS — E11.65 UNCONTROLLED TYPE 2 DIABETES MELLITUS WITH HYPERGLYCEMIA (HCC): ICD-10-CM

## 2023-09-12 RX ORDER — FLASH GLUCOSE SENSOR
KIT MISCELLANEOUS
Qty: 6 EACH | Refills: 1 | Status: SHIPPED | OUTPATIENT
Start: 2023-09-12

## 2023-09-14 ENCOUNTER — OFFICE VISIT (OUTPATIENT)
Dept: INTERNAL MEDICINE CLINIC | Age: 68
End: 2023-09-14
Payer: COMMERCIAL

## 2023-09-14 VITALS
SYSTOLIC BLOOD PRESSURE: 122 MMHG | OXYGEN SATURATION: 98 % | WEIGHT: 158.6 LBS | BODY MASS INDEX: 22.12 KG/M2 | DIASTOLIC BLOOD PRESSURE: 74 MMHG | HEART RATE: 84 BPM

## 2023-09-14 DIAGNOSIS — Z23 FLU VACCINE NEED: ICD-10-CM

## 2023-09-14 DIAGNOSIS — Z79.4 TYPE 2 DIABETES MELLITUS WITHOUT COMPLICATION, WITH LONG-TERM CURRENT USE OF INSULIN (HCC): Primary | ICD-10-CM

## 2023-09-14 DIAGNOSIS — F31.32 BIPOLAR AFFECTIVE DISORDER, CURRENTLY DEPRESSED, MODERATE (HCC): ICD-10-CM

## 2023-09-14 DIAGNOSIS — E11.9 TYPE 2 DIABETES MELLITUS WITHOUT COMPLICATION, WITH LONG-TERM CURRENT USE OF INSULIN (HCC): Primary | ICD-10-CM

## 2023-09-14 DIAGNOSIS — F33.9 RECURRENT MAJOR DEPRESSIVE EPISODES (HCC): ICD-10-CM

## 2023-09-14 DIAGNOSIS — E78.2 MIXED HYPERLIPIDEMIA: ICD-10-CM

## 2023-09-14 DIAGNOSIS — I10 ESSENTIAL HYPERTENSION: ICD-10-CM

## 2023-09-14 DIAGNOSIS — J43.2 CENTRILOBULAR EMPHYSEMA (HCC): ICD-10-CM

## 2023-09-14 LAB — HBA1C MFR BLD: 8.2 %

## 2023-09-14 PROCEDURE — G0008 ADMIN INFLUENZA VIRUS VAC: HCPCS | Performed by: NURSE PRACTITIONER

## 2023-09-14 PROCEDURE — 99214 OFFICE O/P EST MOD 30 MIN: CPT | Performed by: NURSE PRACTITIONER

## 2023-09-14 PROCEDURE — 3074F SYST BP LT 130 MM HG: CPT | Performed by: NURSE PRACTITIONER

## 2023-09-14 PROCEDURE — 3052F HG A1C>EQUAL 8.0%<EQUAL 9.0%: CPT | Performed by: NURSE PRACTITIONER

## 2023-09-14 PROCEDURE — 1123F ACP DISCUSS/DSCN MKR DOCD: CPT | Performed by: NURSE PRACTITIONER

## 2023-09-14 PROCEDURE — 83036 HEMOGLOBIN GLYCOSYLATED A1C: CPT | Performed by: NURSE PRACTITIONER

## 2023-09-14 PROCEDURE — 90694 VACC AIIV4 NO PRSRV 0.5ML IM: CPT | Performed by: NURSE PRACTITIONER

## 2023-09-14 PROCEDURE — 3078F DIAST BP <80 MM HG: CPT | Performed by: NURSE PRACTITIONER

## 2023-09-14 RX ORDER — MULTIVIT-MIN/FA/LYCOPEN/LUTEIN .4-300-25
1 TABLET ORAL DAILY
COMMUNITY

## 2023-09-14 NOTE — PROGRESS NOTES
SUBJECTIVE:    Patient ID: Elza Dover is a 75 y.o. male. CC: Hypertension, depression, BPH, hyperlipidemia, diabetes, COPD    HPI: The patient presents to the office today for follow-up of chronic medical conditions. Patient reports he is doing \"about the same. \"  He is upset about losing Medicaid coverage. He has a history of hypertension and hyperlipidemia. He denies any chest pain or shortness of breath. He is compliant with his medication regimen. He has a history of diabetes. This had been reasonably well controlled with A1c 7.7 - 7.8. More recently this has worsened with A1c up to 9.6 -11.9. He has been a patient of endocrinology though is endocrinology NP he was seeing left the practice. Patient was previously given an order for continuous glucose monitoring and he feels this is helped his glycemic control. Monitor shows 30-day average blood sugar 160.  7-day average blood sugar 136. Current blood sugar 122. A1c today is 8.2. He has a history of anxiety, depression, bipolar disorder. Overall, he feels this is stable. He has a history of COPD. He denies any recent exacerbation.      Declines colonoscopy     Past Medical History:   Diagnosis Date    Allergic rhinitis     arthri     Arthritis     lumbar spine    CAD (coronary artery disease)     Chronic kidney disease     retention, bph    Constipation     DM (diabetes mellitus) (HCC)     Generalized pain     GERD (gastroesophageal reflux disease)     constipation    Heart attack (HCC)     Heart disease     Hyperlipidemia     Movement disorder     lumbar spine arthritis    Psychiatric problem     ptsd, anxiety, depression    Urinary retention         Current Outpatient Medications   Medication Sig Dispense Refill    Multiple Vitamins-Minerals (CENTRUM SILVER ADULT 50+) TABS Take 1 tablet by mouth daily      aspirin (ASPIRIN LOW DOSE) 81 MG EC tablet TAKE ONE TABLET BY MOUTH EVERY DAY 90 tablet 2    NOVOLOG FLEXPEN 100 UNIT/ML

## 2023-09-27 ASSESSMENT — ENCOUNTER SYMPTOMS
GASTROINTESTINAL NEGATIVE: 1
RESPIRATORY NEGATIVE: 1

## 2023-10-13 RX ORDER — DULAGLUTIDE 0.75 MG/.5ML
INJECTION, SOLUTION SUBCUTANEOUS
Qty: 6 ML | Refills: 1 | Status: SHIPPED | OUTPATIENT
Start: 2023-10-13

## 2023-11-16 DIAGNOSIS — E11.65 UNCONTROLLED TYPE 2 DIABETES MELLITUS WITH HYPERGLYCEMIA (HCC): ICD-10-CM

## 2023-11-17 RX ORDER — EMPAGLIFLOZIN 10 MG/1
10 TABLET, FILM COATED ORAL DAILY
Qty: 90 TABLET | Refills: 1 | Status: SHIPPED | OUTPATIENT
Start: 2023-11-17

## 2024-02-08 DIAGNOSIS — R35.0 BENIGN PROSTATIC HYPERPLASIA WITH URINARY FREQUENCY: ICD-10-CM

## 2024-02-08 DIAGNOSIS — N40.1 BENIGN PROSTATIC HYPERPLASIA WITH URINARY FREQUENCY: ICD-10-CM

## 2024-02-08 DIAGNOSIS — I10 ESSENTIAL HYPERTENSION: ICD-10-CM

## 2024-02-08 DIAGNOSIS — F43.10 POSTTRAUMATIC STRESS DISORDER: ICD-10-CM

## 2024-02-08 DIAGNOSIS — G25.2 ACTION TREMOR: ICD-10-CM

## 2024-02-08 DIAGNOSIS — F33.9 RECURRENT MAJOR DEPRESSIVE EPISODES (HCC): ICD-10-CM

## 2024-02-08 DIAGNOSIS — E11.65 UNCONTROLLED TYPE 2 DIABETES MELLITUS WITH HYPERGLYCEMIA (HCC): ICD-10-CM

## 2024-02-08 DIAGNOSIS — E78.2 MIXED HYPERLIPIDEMIA: ICD-10-CM

## 2024-02-08 DIAGNOSIS — F31.32 BIPOLAR AFFECTIVE DISORDER, CURRENTLY DEPRESSED, MODERATE (HCC): ICD-10-CM

## 2024-02-08 RX ORDER — PRIMIDONE 50 MG/1
50 TABLET ORAL DAILY
Qty: 90 TABLET | Refills: 1 | Status: SHIPPED | OUTPATIENT
Start: 2024-02-08

## 2024-02-08 RX ORDER — INSULIN GLARGINE 100 [IU]/ML
INJECTION, SOLUTION SUBCUTANEOUS
Qty: 30 ML | Refills: 2 | Status: SHIPPED | OUTPATIENT
Start: 2024-02-08

## 2024-02-08 RX ORDER — SIMVASTATIN 40 MG
40 TABLET ORAL NIGHTLY
Qty: 90 TABLET | Refills: 1 | Status: SHIPPED | OUTPATIENT
Start: 2024-02-08

## 2024-02-08 RX ORDER — PROPRANOLOL HCL 60 MG
60 CAPSULE, EXTENDED RELEASE 24HR ORAL DAILY
Qty: 90 CAPSULE | Refills: 1 | Status: SHIPPED | OUTPATIENT
Start: 2024-02-08

## 2024-02-08 RX ORDER — TAMSULOSIN HYDROCHLORIDE 0.4 MG/1
0.4 CAPSULE ORAL DAILY
Qty: 90 CAPSULE | Refills: 1 | Status: SHIPPED | OUTPATIENT
Start: 2024-02-08

## 2024-02-08 RX ORDER — QUETIAPINE FUMARATE 400 MG/1
TABLET, FILM COATED ORAL
Qty: 90 TABLET | Refills: 1 | Status: SHIPPED | OUTPATIENT
Start: 2024-02-08

## 2024-02-08 RX ORDER — BUPROPION HYDROCHLORIDE 100 MG/1
100 TABLET, EXTENDED RELEASE ORAL 2 TIMES DAILY
Qty: 180 TABLET | Refills: 1 | Status: SHIPPED | OUTPATIENT
Start: 2024-02-08

## 2024-02-08 RX ORDER — METFORMIN HYDROCHLORIDE 500 MG/1
2000 TABLET, EXTENDED RELEASE ORAL
Qty: 360 TABLET | Refills: 1 | Status: SHIPPED | OUTPATIENT
Start: 2024-02-08

## 2024-02-08 RX ORDER — POTASSIUM CHLORIDE 750 MG/1
10 TABLET, FILM COATED, EXTENDED RELEASE ORAL DAILY
Qty: 90 TABLET | Refills: 1 | Status: SHIPPED | OUTPATIENT
Start: 2024-02-08

## 2024-02-08 NOTE — TELEPHONE ENCOUNTER
Pharmacy calling requesting refill of   - primidone (MYSOLINE) 50 MG tablet   - simvastatin (ZOCOR) 40 MG tablet   - metFORMIN (GLUCOPHAGE-XR) 500 MG extended release tablet   - tamsulosin (FLOMAX) 0.4 MG capsule   - QUEtiapine (SEROQUEL) 400 MG tablet   - buPROPion (WELLBUTRIN SR) 100 MG extended release tablet   - potassium chloride (KLOR-CON) 10 MEQ extended release tablet   - propranolol (INDERAL LA) 60 MG extended release capsule   - insulin glargine (BASAGLAR KWIKPEN) 100 UNIT/ML injection pen     Please send to [facility] on [address] Fax if needed is [phone]

## 2024-03-12 ENCOUNTER — OFFICE VISIT (OUTPATIENT)
Dept: INTERNAL MEDICINE CLINIC | Age: 69
End: 2024-03-12

## 2024-03-12 VITALS
DIASTOLIC BLOOD PRESSURE: 60 MMHG | HEIGHT: 71 IN | OXYGEN SATURATION: 97 % | HEART RATE: 68 BPM | SYSTOLIC BLOOD PRESSURE: 108 MMHG | BODY MASS INDEX: 22.12 KG/M2

## 2024-03-12 DIAGNOSIS — R35.0 BENIGN PROSTATIC HYPERPLASIA WITH URINARY FREQUENCY: ICD-10-CM

## 2024-03-12 DIAGNOSIS — E78.2 MIXED HYPERLIPIDEMIA: ICD-10-CM

## 2024-03-12 DIAGNOSIS — J43.2 CENTRILOBULAR EMPHYSEMA (HCC): ICD-10-CM

## 2024-03-12 DIAGNOSIS — Z12.5 PROSTATE CANCER SCREENING: ICD-10-CM

## 2024-03-12 DIAGNOSIS — Z87.891 PERSONAL HISTORY OF TOBACCO USE: ICD-10-CM

## 2024-03-12 DIAGNOSIS — F31.32 BIPOLAR AFFECTIVE DISORDER, CURRENTLY DEPRESSED, MODERATE (HCC): ICD-10-CM

## 2024-03-12 DIAGNOSIS — F33.9 RECURRENT MAJOR DEPRESSIVE EPISODES (HCC): ICD-10-CM

## 2024-03-12 DIAGNOSIS — F43.10 POSTTRAUMATIC STRESS DISORDER: ICD-10-CM

## 2024-03-12 DIAGNOSIS — I10 ESSENTIAL HYPERTENSION: ICD-10-CM

## 2024-03-12 DIAGNOSIS — E11.65 UNCONTROLLED TYPE 2 DIABETES MELLITUS WITH HYPERGLYCEMIA (HCC): ICD-10-CM

## 2024-03-12 DIAGNOSIS — Z00.00 MEDICARE ANNUAL WELLNESS VISIT, SUBSEQUENT: Primary | ICD-10-CM

## 2024-03-12 DIAGNOSIS — N40.1 BENIGN PROSTATIC HYPERPLASIA WITH URINARY FREQUENCY: ICD-10-CM

## 2024-03-12 DIAGNOSIS — G25.2 ACTION TREMOR: ICD-10-CM

## 2024-03-12 LAB — HBA1C MFR BLD: 14 %

## 2024-03-12 RX ORDER — PRIMIDONE 50 MG/1
50 TABLET ORAL DAILY
Qty: 90 TABLET | Refills: 3 | Status: SHIPPED | OUTPATIENT
Start: 2024-03-12

## 2024-03-12 RX ORDER — SIMVASTATIN 40 MG
40 TABLET ORAL NIGHTLY
Qty: 90 TABLET | Refills: 3 | Status: SHIPPED | OUTPATIENT
Start: 2024-03-12

## 2024-03-12 RX ORDER — QUETIAPINE FUMARATE 400 MG/1
TABLET, FILM COATED ORAL
Qty: 90 TABLET | Refills: 3 | Status: SHIPPED | OUTPATIENT
Start: 2024-03-12

## 2024-03-12 RX ORDER — PROPRANOLOL HCL 60 MG
60 CAPSULE, EXTENDED RELEASE 24HR ORAL DAILY
Qty: 90 CAPSULE | Refills: 3 | Status: SHIPPED | OUTPATIENT
Start: 2024-03-12

## 2024-03-12 RX ORDER — BUPROPION HYDROCHLORIDE 100 MG/1
100 TABLET, EXTENDED RELEASE ORAL 2 TIMES DAILY
Qty: 180 TABLET | Refills: 3 | Status: SHIPPED | OUTPATIENT
Start: 2024-03-12

## 2024-03-12 RX ORDER — POTASSIUM CHLORIDE 750 MG/1
10 TABLET, FILM COATED, EXTENDED RELEASE ORAL DAILY
Qty: 90 TABLET | Refills: 3 | Status: SHIPPED | OUTPATIENT
Start: 2024-03-12

## 2024-03-12 RX ORDER — TAMSULOSIN HYDROCHLORIDE 0.4 MG/1
0.4 CAPSULE ORAL DAILY
Qty: 90 CAPSULE | Refills: 3 | Status: SHIPPED | OUTPATIENT
Start: 2024-03-12

## 2024-03-12 RX ORDER — ASPIRIN 81 MG/1
TABLET ORAL
Qty: 90 TABLET | Refills: 3 | Status: SHIPPED | OUTPATIENT
Start: 2024-03-12

## 2024-03-12 RX ORDER — METFORMIN HYDROCHLORIDE 500 MG/1
2000 TABLET, EXTENDED RELEASE ORAL
Qty: 360 TABLET | Refills: 3 | Status: SHIPPED | OUTPATIENT
Start: 2024-03-12

## 2024-03-12 RX ORDER — INSULIN ASPART 100 [IU]/ML
INJECTION, SOLUTION INTRAVENOUS; SUBCUTANEOUS
Qty: 5 ADJUSTABLE DOSE PRE-FILLED PEN SYRINGE | Refills: 5 | Status: SHIPPED | OUTPATIENT
Start: 2024-03-12

## 2024-03-12 RX ORDER — INSULIN GLARGINE 100 [IU]/ML
INJECTION, SOLUTION SUBCUTANEOUS
Qty: 30 ML | Refills: 2 | Status: SHIPPED | OUTPATIENT
Start: 2024-03-12

## 2024-03-12 ASSESSMENT — LIFESTYLE VARIABLES
HOW OFTEN DURING THE LAST YEAR HAVE YOU BEEN UNABLE TO REMEMBER WHAT HAPPENED THE NIGHT BEFORE BECAUSE YOU HAD BEEN DRINKING: 0
HOW OFTEN DURING THE LAST YEAR HAVE YOU FAILED TO DO WHAT WAS NORMALLY EXPECTED FROM YOU BECAUSE OF DRINKING: 0
HOW OFTEN DURING THE LAST YEAR HAVE YOU NEEDED AN ALCOHOLIC DRINK FIRST THING IN THE MORNING TO GET YOURSELF GOING AFTER A NIGHT OF HEAVY DRINKING: 0
HAS A RELATIVE, FRIEND, DOCTOR, OR ANOTHER HEALTH PROFESSIONAL EXPRESSED CONCERN ABOUT YOUR DRINKING OR SUGGESTED YOU CUT DOWN: 0
HAVE YOU OR SOMEONE ELSE BEEN INJURED AS A RESULT OF YOUR DRINKING: 0
HOW MANY STANDARD DRINKS CONTAINING ALCOHOL DO YOU HAVE ON A TYPICAL DAY: 1 OR 2
HOW OFTEN DURING THE LAST YEAR HAVE YOU HAD A FEELING OF GUILT OR REMORSE AFTER DRINKING: 0
HOW OFTEN DO YOU HAVE A DRINK CONTAINING ALCOHOL: 4 OR MORE TIMES A WEEK
HOW OFTEN DURING THE LAST YEAR HAVE YOU FOUND THAT YOU WERE NOT ABLE TO STOP DRINKING ONCE YOU HAD STARTED: 0

## 2024-03-12 ASSESSMENT — PATIENT HEALTH QUESTIONNAIRE - PHQ9
9. THOUGHTS THAT YOU WOULD BE BETTER OFF DEAD, OR OF HURTING YOURSELF: 1
1. LITTLE INTEREST OR PLEASURE IN DOING THINGS: 3
4. FEELING TIRED OR HAVING LITTLE ENERGY: 3
SUM OF ALL RESPONSES TO PHQ QUESTIONS 1-9: 19
3. TROUBLE FALLING OR STAYING ASLEEP: 3
2. FEELING DOWN, DEPRESSED OR HOPELESS: 3
7. TROUBLE CONCENTRATING ON THINGS, SUCH AS READING THE NEWSPAPER OR WATCHING TELEVISION: 3
SUM OF ALL RESPONSES TO PHQ QUESTIONS 1-9: 18
5. POOR APPETITE OR OVEREATING: 3
SUM OF ALL RESPONSES TO PHQ QUESTIONS 1-9: 19
8. MOVING OR SPEAKING SO SLOWLY THAT OTHER PEOPLE COULD HAVE NOTICED. OR THE OPPOSITE, BEING SO FIGETY OR RESTLESS THAT YOU HAVE BEEN MOVING AROUND A LOT MORE THAN USUAL: 0
SUM OF ALL RESPONSES TO PHQ QUESTIONS 1-9: 19
10. IF YOU CHECKED OFF ANY PROBLEMS, HOW DIFFICULT HAVE THESE PROBLEMS MADE IT FOR YOU TO DO YOUR WORK, TAKE CARE OF THINGS AT HOME, OR GET ALONG WITH OTHER PEOPLE: 2
SUM OF ALL RESPONSES TO PHQ9 QUESTIONS 1 & 2: 6
6. FEELING BAD ABOUT YOURSELF - OR THAT YOU ARE A FAILURE OR HAVE LET YOURSELF OR YOUR FAMILY DOWN: 0

## 2024-03-12 ASSESSMENT — ENCOUNTER SYMPTOMS
GASTROINTESTINAL NEGATIVE: 1
RESPIRATORY NEGATIVE: 1

## 2024-03-12 ASSESSMENT — COLUMBIA-SUICIDE SEVERITY RATING SCALE - C-SSRS
6. HAVE YOU EVER DONE ANYTHING, STARTED TO DO ANYTHING, OR PREPARED TO DO ANYTHING TO END YOUR LIFE?: NO
2. HAVE YOU ACTUALLY HAD ANY THOUGHTS OF KILLING YOURSELF?: NO

## 2024-03-12 NOTE — PROGRESS NOTES
(FREESTYLE MILLIE 14 DAY SENSOR) [facility] USE ONE SENSOR FOR 14 DAYS. Yes Omid Barry APRN - CNP   aspirin (ASPIRIN LOW DOSE) 81 MG EC tablet TAKE ONE TABLET BY MOUTH EVERY DAY Yes Omid Barry APRN - CNP   NOVOLOG FLEXPEN 100 UNIT/ML injection pen INJECT UP  UNITS SUBCUTANEOUSLY 3 TIMES A DAY AS DIRECTED Yes Omid Barry APRN - CNP   Insulin Pen Needle (PEN NEEDLES 5/16\") 30G X 8 MM MISC 1 each by Does not apply route 5 times daily Yes Omid Barry APRN - CNP   vitamin D (VITAMIN D3 ULTRA STRENGTH) 125 MCG (5000 UT) CAPS capsule Take 1 capsule by mouth daily 2 x a week Yes Unruly eNwman MD   Continuous Blood Gluc  (FREESTYLE MILLIE 14 DAY READER) REEMA 1 Units by Does not apply route as needed (as needed) Yes Lor Schultz APRN - CNP   Continuous Blood Gluc  (FREESTYLE MILLIE 14 DAY READER) REEMA 1 Units by Does not apply route as needed (as needed) Yes Lor Schultz APRN - CNP   Continuous Blood Gluc  (FREESTYLE MILLIE 14 DAY READER) REEMA 1 Units by Does not apply route as needed (as needed) Yes Lor Schultz APRN - CNP   Melatonin 10 MG CAPS Take 8 capsules by mouth nightly Pt taking 100 mg at bed time Yes ProviderUnruly MD   diphenhydrAMINE (BENADRYL) 25 MG tablet Take 2 tablets by mouth every 3-4 hours as needed for Allergies or Sleep Yes ProviderUnruly MD   TRULICITY 0.75 MG/0.5ML SOPN INJECT 0.75MG SUBCUTANEOUSLY ONCE A WEEK  Patient not taking: Reported on 3/12/2024  Omid Barry APRN - CNP   Meals on Wheels MISC by Does not apply route Diabetic diet  Patient not taking: Reported on 3/12/2024  Omid Barry APRN - CNP       CareTeam (Including outside providers/suppliers regularly involved in providing care):   Patient Care Team:  Omid Barry APRN - CNP as PCP - General (Nurse Practitioner)  Omid Barry APRN - CNP as PCP - Empaneled Provider  Lavelle Dimas RN as Nurse Navigator  Yoli Carreon, RN as Nurse

## 2024-03-12 NOTE — ASSESSMENT & PLAN NOTE
Lipids at goal  Conitnues with statins but occasional side effects including myalgias and constipation Kassandra Baeza(Resident)

## 2024-03-15 ENCOUNTER — CARE COORDINATION (OUTPATIENT)
Dept: CARE COORDINATION | Age: 69
End: 2024-03-15

## 2024-03-15 NOTE — CARE COORDINATION
Pcp referral to care coordination; financial resources, . No answer; message left requesting call return. RN-CC will follow up at later date & time. Attempt #1

## 2024-03-21 ENCOUNTER — CARE COORDINATION (OUTPATIENT)
Dept: CARE COORDINATION | Age: 69
End: 2024-03-21

## 2024-03-21 SDOH — ECONOMIC STABILITY: FOOD INSECURITY: WITHIN THE PAST 12 MONTHS, YOU WORRIED THAT YOUR FOOD WOULD RUN OUT BEFORE YOU GOT MONEY TO BUY MORE.: SOMETIMES TRUE

## 2024-03-21 SDOH — ECONOMIC STABILITY: FOOD INSECURITY: WITHIN THE PAST 12 MONTHS, THE FOOD YOU BOUGHT JUST DIDN'T LAST AND YOU DIDN'T HAVE MONEY TO GET MORE.: NEVER TRUE

## 2024-03-21 SDOH — ECONOMIC STABILITY: INCOME INSECURITY: IN THE LAST 12 MONTHS, WAS THERE A TIME WHEN YOU WERE NOT ABLE TO PAY THE MORTGAGE OR RENT ON TIME?: NO

## 2024-03-21 SDOH — ECONOMIC STABILITY: HOUSING INSECURITY: IN THE LAST 12 MONTHS, HOW MANY PLACES HAVE YOU LIVED?: 1

## 2024-03-21 ASSESSMENT — SOCIAL DETERMINANTS OF HEALTH (SDOH)
HOW OFTEN DO YOU GET TOGETHER WITH FRIENDS OR RELATIVES?: NEVER
DO YOU BELONG TO ANY CLUBS OR ORGANIZATIONS SUCH AS CHURCH GROUPS UNIONS, FRATERNAL OR ATHLETIC GROUPS, OR SCHOOL GROUPS?: NO
HOW OFTEN DO YOU ATTENT MEETINGS OF THE CLUB OR ORGANIZATION YOU BELONG TO?: NEVER
HOW OFTEN DO YOU ATTEND CHURCH OR RELIGIOUS SERVICES?: NEVER
IN A TYPICAL WEEK, HOW MANY TIMES DO YOU TALK ON THE PHONE WITH FAMILY, FRIENDS, OR NEIGHBORS?: NEVER

## 2024-03-21 NOTE — CARE COORDINATION
Ambulatory Care Coordination Note  3/21/2024    Patient Current Location:  [place]    ACM contacted the patient by telephone. Verified name and  with patient as identifiers. Provided introduction to self, and explanation of the ACM role.     ACM: Sandi Menchaca RN    Challenges to be reviewed by the provider   Additional needs identified to be addressed with provider: No  none               Method of communication with provider: none.    Pcp referral to care coordination. RN-CC outreached patient; introduced self and role of care coordinator.     Patient is a 68 yr old disabled  male with pmhx of dm2, emphysema, htn and bipolar. 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 Patient reports financial stressors that are interfering with his ability to manage his health. Patient states his property taxes recently increased by 268% which has caused him a great deal of stress. Patient states this added expense keeps him up at night, worrying about his finances.He declines any mental health services and is not open to conversation. Patient states he owns his home outright. He is on a fixed income and collects $987/month in [employer]. He receives $106 in SNAP benefits monthly. He states although this is helpful, it is not enough. We discussed [employer] to maximize his benefits but he states he has no way of getting to the markets that participate in this program. We discussed local food pantries, but again he is reluctant due to transportation issues. He does not drive or own a vehicle. He drives his electric scooter to his local [employer]. He states he has to stay within a certain distance due to limited battery life on his scooter. He lost his Medicaid and Q[employer] benefits in November due to having too much money in his savings account. He states he has since spent down his savings account by making home improvements. He states he was told he is now eligible for

## 2024-03-26 ENCOUNTER — CARE COORDINATION (OUTPATIENT)
Dept: CARE COORDINATION | Age: 69
End: 2024-03-26

## 2024-03-26 NOTE — CARE COORDINATION
SW received referral from [facility] for assistance with community resource needs. SW placed call to patient to discuss; left voicemail and requested return call. Will continue to follow.    Kassi Dupont MSW, LSW     [phone]

## 2024-04-02 ENCOUNTER — CARE COORDINATION (OUTPATIENT)
Dept: CARE COORDINATION | Age: 69
End: 2024-04-02

## 2024-04-02 NOTE — CARE COORDINATION
AIDEN placed call to patient to discuss community resource needs. Patient reports his COA  is scheduled for a home visit tomorrow and will discuss application for Medicaid and Passport program.   AIDEN discussed property tax payment assistance through the [employer] [phone]. AIDEN provided him with the contact number as patient prefers not to apply online. He will contact them tomorrow.     AIDEN also discussed completion of AD's and purpose of documents. Patient does not have anyone who is reliable/reachable by phone who he would entrust to act as his HCPOA. He reports he has a younger brother who lives in [place], [place], but he does not answer or return calls. Patient requested to \"drop it\" as he does not have anyone to name.     SW to touch base with patient again next week to offer continued support with Medicaid and tax payment assistance.     Kassi Dupont MSW, LSW     [phone]

## 2024-04-04 ENCOUNTER — CARE COORDINATION (OUTPATIENT)
Dept: CARE COORDINATION | Age: 69
End: 2024-04-04

## 2024-04-04 NOTE — CARE COORDINATION
RN-CC attempted to outreach patient for cc follow up; AIDEN referral, property tax relief, PASSPORT and Medicaid eligibility, food resources, transportation, complete enrollment. No answer; HIPAA compliant message left through  requesting call return. RN-CC will follow up at later date & time.

## 2024-04-09 ENCOUNTER — CARE COORDINATION (OUTPATIENT)
Dept: CARE COORDINATION | Age: 69
End: 2024-04-09

## 2024-04-09 NOTE — CARE COORDINATION
SW placed call to patient to follow up on previously discussed resources. Left voicemail and requested return call. Will continue to follow.    Kassi Dupont MSW, LSW     [phone]

## 2024-04-09 NOTE — CARE COORDINATION
SW received call back from patient who stated visit with COA  went well; they did not discuss the Passport program. Patient receives weekly aide visit for three hours and he feels this is enough. SW encouraged patient to contact his CM if needs change.   Patient reports he has the Medicaid application and intends to fill it out and send it in. He does not have any questions about it at this time. He will call this worker if any arise. No further needs, will sign off at this time.     Kassi Dupont MSW, LSW     [phone]

## 2024-04-18 ENCOUNTER — CARE COORDINATION (OUTPATIENT)
Dept: CARE COORDINATION | Age: 69
End: 2024-04-18

## 2024-04-18 NOTE — CARE COORDINATION
RN-CC attempted to outreach patient for cc follow up; AIDEN referral, property tax relief, PASSPORT and Medicaid eligibility, food resources, transportation, complete enrollment. No answer; HIPAA compliant message left through  requesting call return. RN-CC will follow up at later date & time. Attempt #2

## 2024-04-29 ENCOUNTER — CARE COORDINATION (OUTPATIENT)
Dept: CARE COORDINATION | Age: 69
End: 2024-04-29

## 2024-04-29 NOTE — CARE COORDINATION
Ambulatory Care Coordination Note  2024    Patient Current Location:  [place]     ACM contacted the patient by telephone. Verified name and  with patient as identifiers. Provided introduction to self, and explanation of the ACM role.     Challenges to be reviewed by the provider   Additional needs identified to be addressed with provider: No  none               Method of communication with provider: chart routing.    ACM: Sandi Menchaca RN    RN-CC outreached patient for cc follow up;  referral, property tax relief, PASSPORT and Medicaid eligibility, food resources, transportation, complete enrollment.     Patient states he has the Medicaid application and will complete on his own. Patient states he keeps getting distracted and hasn't completed to date. RN-CC encouraged patient to contact his COA CMLarissa but patient insists he can complete on his own.     Patient receives a hha weekly for 3 hours. Patient states he hasn't had a good aide in weeks. Patient admits he has high standards and the aides they keep sending through [facility] aren't meeting them. Patient contacted [facility] last week and requested a new aide. HHA's are limited due to high needs.     Patient is now active with [employer] for transportation. Patient states he receives 3 medical and 3 non-medial round trip rides per month. Patient states his is more than enough rides. Patient has the contact information for [employer] and understands how to setup transportation.     Patient states he lost meals on wheels when he lost Medicaid benefits. Patient advised to contact his CM w/[employer] to see if he can resume this service.     Patient states he has the contact information for [facility]. Patient states he plans on contacting them to discuss property tax relief.     Diabetes - patient states he still is not ready to address diabetes. Patient wants to get his finances in order first. RN-CC informed patient we can follow up on this

## 2024-05-20 ENCOUNTER — CARE COORDINATION (OUTPATIENT)
Dept: CARE COORDINATION | Age: 69
End: 2024-05-20

## 2024-05-20 NOTE — CARE COORDINATION
ACM attempted to outreach patient for cc follow up; finances, diabetes, Complete enrollment - cc protocol. No answer; HIPAA compliant message left through  requesting call return. ACM will f/u at later date & time.

## 2024-05-30 ENCOUNTER — CARE COORDINATION (OUTPATIENT)
Dept: CARE COORDINATION | Age: 69
End: 2024-05-30

## 2024-05-30 NOTE — CARE COORDINATION
Ambulatory Care Coordination Note  2024    Patient Current Location:  [place]     ACM contacted the patient by telephone. Verified name and  with patient as identifiers. Provided introduction to self, and explanation of the ACM role.     Challenges to be reviewed by the provider   Additional needs identified to be addressed with provider: No  none               Method of communication with provider: none.    ACM: Sandi Menchaca RN    ACM returned call to patient. Patient apologized for not returning ACM calls, states he has been in a \"dark place\" for about a month but is coming out of it. Patient is not receptive to psych referral. Patient states he shut his left foot and leg in a door at [facility]. Patient states the wound become infected but is now healing. Patient states he is applying triple antibiotic ointment and covering with tefla dressing which has seemed to help. Patient states he has not been evaluated by a medical provider, patient declines appointment. ACM advised on keeping wound clean and dry, red flag sx reviewed. He is scheduled to see his pcp on 2024. ACM reminded patient of appointment date & time. Patient states he will call to setup transportation through [employer].     Patient has been in contact with his [employer] CM, Larissa Washington. Per patient, Larissa gave him a Medicaid application which he has not completed to date. Patient states he intends to complete this soon. Patient states he is not eligible for free MOW until he is approved for Medicaid.     Patient states [employer] [facility] installed a handicap equipped bathroom with walk in shower. Patient states they installed a standard height toilet and plans on contacting them to request an elevated toilet.     Patient has not followed through with any resources related to property tax relief. Patient states he intends on contacting [facility] soon.     Diabetes - patient is still not ready to address diabetes. Patient wants to get his

## 2024-05-30 NOTE — TELEPHONE ENCOUNTER
I agree with the Care Coordinator's Plan of Care.    Thank you Sandi - awesome work!      Omid Barry, APRN - CNP

## 2024-06-12 ENCOUNTER — CARE COORDINATION (OUTPATIENT)
Dept: CARE COORDINATION | Age: 69
End: 2024-06-12

## 2024-06-12 NOTE — CARE COORDINATION
Ambulatory Care Coordination Note  2024    Patient Current Location:  [place]     ACM contacted the patient by telephone. Verified name and  with patient as identifiers. Provided introduction to self, and explanation of the ACM role.     Challenges to be reviewed by the provider   Additional needs identified to be addressed with provider: No  none               Method of communication with provider: none.    ACM: Sandi Menchaca RN    ACM outreached patient for cc follow up; pcp appt, needs & concerns.     Patient cancelled 3 month f/u with pcp. Patient states he is not scheduling any medical appointments until he has been approved for Medicaid and QMB. Patient states he has too many co-pays. [facility] discussed [employer] Patient Assistance but patient states he does not have any medical bills. Patient states he submitted his Medicaid application and is scheduled for a telephonic interview tomorrow.     Patient has not been in contact with [facility] regarding raised toilet seat. States he intends to call soon.     Leg wound healing with issue. Patient states he is cleaning with antibacterial soap, applying triple antibiotic ointment and leaving ameya. No new or worsening sx reported.     Patient states [employer] sent a new aide but didn't supply them with gloves. Patient is very upset by this and has been in contact with [employer] who has sent a supply to his home.     Diabetes - patient is still not ready to address diabetes. Continues to state he wants to get his finances in order first.     Patient very disgruntled during our call, apologizes for the \"foul humor.\" Patient requesting call return in a few weeks.     PLAN:    F/u on medicaid application  Prep for discharge         Offered patient enrollment in the Remote Patient Monitoring (RPM) program for in-home monitoring: Yes, but did not enroll at this time: declined to enroll in the program becausefinancial concerns .    Lab Results       None            Care Coordination Interventions

## 2024-07-01 ENCOUNTER — TELEPHONE (OUTPATIENT)
Dept: INTERNAL MEDICINE CLINIC | Age: 69
End: 2024-07-01

## 2024-07-01 NOTE — TELEPHONE ENCOUNTER
Spoke to pt, he is waiting for insurance on 7/3. He will call back to schedule appt.     Last OV: 3/12/2024

## 2024-07-08 ENCOUNTER — HOSPITAL ENCOUNTER (INPATIENT)
Age: 69
LOS: 3 days | Discharge: HOME | End: 2024-07-11
Attending: EMERGENCY MEDICINE | Admitting: FAMILY MEDICINE
Payer: MEDICARE

## 2024-07-08 ENCOUNTER — CARE COORDINATION (OUTPATIENT)
Dept: CARE COORDINATION | Age: 69
End: 2024-07-08

## 2024-07-08 ENCOUNTER — APPOINTMENT (OUTPATIENT)
Dept: GENERAL RADIOLOGY | Age: 69
End: 2024-07-08
Payer: MEDICARE

## 2024-07-08 ENCOUNTER — APPOINTMENT (OUTPATIENT)
Dept: CT IMAGING | Age: 69
End: 2024-07-08
Payer: MEDICARE

## 2024-07-08 DIAGNOSIS — Z79.4 TYPE 2 DIABETES MELLITUS WITH HYPERGLYCEMIA, WITH LONG-TERM CURRENT USE OF INSULIN (HCC): ICD-10-CM

## 2024-07-08 DIAGNOSIS — E86.0 DEHYDRATION: ICD-10-CM

## 2024-07-08 DIAGNOSIS — R53.1 GENERALIZED WEAKNESS: ICD-10-CM

## 2024-07-08 DIAGNOSIS — E11.65 UNCONTROLLED TYPE 2 DIABETES MELLITUS WITH HYPERGLYCEMIA (HCC): ICD-10-CM

## 2024-07-08 DIAGNOSIS — E11.10 DKA, TYPE 2, NOT AT GOAL (HCC): Primary | ICD-10-CM

## 2024-07-08 DIAGNOSIS — E87.20 METABOLIC ACIDOSIS: ICD-10-CM

## 2024-07-08 DIAGNOSIS — I24.9 ACUTE CORONARY SYNDROME (HCC): ICD-10-CM

## 2024-07-08 DIAGNOSIS — R79.89 ELEVATED TROPONIN: ICD-10-CM

## 2024-07-08 DIAGNOSIS — R73.9 HYPERGLYCEMIA: ICD-10-CM

## 2024-07-08 DIAGNOSIS — E11.65 TYPE 2 DIABETES MELLITUS WITH HYPERGLYCEMIA, WITH LONG-TERM CURRENT USE OF INSULIN (HCC): ICD-10-CM

## 2024-07-08 DIAGNOSIS — E11.10 DIABETIC KETOACIDOSIS WITHOUT COMA ASSOCIATED WITH TYPE 2 DIABETES MELLITUS (HCC): ICD-10-CM

## 2024-07-08 LAB
ALBUMIN SERPL-MCNC: 3.9 G/DL (ref 3.4–5)
ALBUMIN/GLOB SERPL: 1.1 {RATIO} (ref 1.1–2.2)
ALP SERPL-CCNC: 109 U/L (ref 40–129)
ALT SERPL-CCNC: 13 U/L (ref 10–40)
AMPHETAMINES UR QL SCN>1000 NG/ML: NORMAL
ANION GAP SERPL CALCULATED.3IONS-SCNC: 26 MMOL/L (ref 3–16)
AST SERPL-CCNC: 14 U/L (ref 15–37)
BACTERIA URNS QL MICRO: NORMAL /HPF
BARBITURATES UR QL SCN>200 NG/ML: NORMAL
BASE EXCESS BLDV CALC-SCNC: -5.3 MMOL/L
BASOPHILS # BLD: 0.1 K/UL (ref 0–0.2)
BASOPHILS NFR BLD: 0.6 %
BENZODIAZ UR QL SCN>200 NG/ML: NORMAL
BETA-HYDROXYBUTYRATE: 5.55 MMOL/L (ref 0–0.27)
BILIRUB SERPL-MCNC: 0.3 MG/DL (ref 0–1)
BILIRUB UR QL STRIP.AUTO: NEGATIVE
BUN SERPL-MCNC: 22 MG/DL (ref 7–20)
CALCIUM SERPL-MCNC: 9.5 MG/DL (ref 8.3–10.6)
CANNABINOIDS UR QL SCN>50 NG/ML: NORMAL
CHLORIDE SERPL-SCNC: 88 MMOL/L (ref 99–110)
CLARITY UR: CLEAR
CO2 BLDV-SCNC: 23 MMOL/L
CO2 SERPL-SCNC: 15 MMOL/L (ref 21–32)
COCAINE UR QL SCN: NORMAL
COHGB MFR BLDV: 0.4 %
COLOR UR: YELLOW
CREAT SERPL-MCNC: 0.8 MG/DL (ref 0.8–1.3)
DEPRECATED RDW RBC AUTO: 14.5 % (ref 12.4–15.4)
DRUG SCREEN COMMENT UR-IMP: NORMAL
EOSINOPHIL # BLD: 0.1 K/UL (ref 0–0.6)
EOSINOPHIL NFR BLD: 0.7 %
EPI CELLS #/AREA URNS AUTO: 0 /HPF (ref 0–5)
ETHANOLAMINE SERPL-MCNC: NORMAL MG/DL (ref 0–0.08)
FENTANYL SCREEN, URINE: NORMAL
GFR SERPLBLD CREATININE-BSD FMLA CKD-EPI: >90 ML/MIN/{1.73_M2}
GLUCOSE BLD-MCNC: 232 MG/DL (ref 70–99)
GLUCOSE BLD-MCNC: 300 MG/DL (ref 70–99)
GLUCOSE BLD-MCNC: 390 MG/DL (ref 70–99)
GLUCOSE SERPL-MCNC: 511 MG/DL (ref 70–99)
GLUCOSE UR STRIP.AUTO-MCNC: >=1000 MG/DL
HCO3 BLDV-SCNC: 21 MMOL/L (ref 23–29)
HCT VFR BLD AUTO: 37.9 % (ref 40.5–52.5)
HGB BLD-MCNC: 12.3 G/DL (ref 13.5–17.5)
HGB UR QL STRIP.AUTO: NEGATIVE
HYALINE CASTS #/AREA URNS AUTO: 0 /LPF (ref 0–8)
KETONES UR STRIP.AUTO-MCNC: 80 MG/DL
LACTATE BLDV-SCNC: 1.6 MMOL/L (ref 0.4–1.9)
LACTATE BLDV-SCNC: 1.8 MMOL/L (ref 0.4–1.9)
LEUKOCYTE ESTERASE UR QL STRIP.AUTO: NEGATIVE
LYMPHOCYTES # BLD: 1.1 K/UL (ref 1–5.1)
LYMPHOCYTES NFR BLD: 11.2 %
MCH RBC QN AUTO: 31.7 PG (ref 26–34)
MCHC RBC AUTO-ENTMCNC: 32.6 G/DL (ref 31–36)
MCV RBC AUTO: 97.3 FL (ref 80–100)
METHADONE UR QL SCN>300 NG/ML: NORMAL
METHGB MFR BLDV: 0.6 %
MONOCYTES # BLD: 0.8 K/UL (ref 0–1.3)
MONOCYTES NFR BLD: 8.6 %
NEUTROPHILS # BLD: 7.6 K/UL (ref 1.7–7.7)
NEUTROPHILS NFR BLD: 78.9 %
NITRITE UR QL STRIP.AUTO: NEGATIVE
O2 THERAPY: ABNORMAL
OPIATES UR QL SCN>300 NG/ML: NORMAL
OXYCODONE UR QL SCN: NORMAL
PCO2 BLDV: 43.9 MMHG (ref 40–50)
PCP UR QL SCN>25 NG/ML: NORMAL
PERFORMED ON: ABNORMAL
PH BLDV: 7.29 [PH] (ref 7.35–7.45)
PH UR STRIP.AUTO: 5 [PH] (ref 5–8)
PH UR STRIP: 5 [PH]
PLATELET # BLD AUTO: 247 K/UL (ref 135–450)
PMV BLD AUTO: 8.6 FL (ref 5–10.5)
PO2 BLDV: 40 MMHG
POTASSIUM SERPL-SCNC: 4.3 MMOL/L (ref 3.5–5.1)
PROT SERPL-MCNC: 7.4 G/DL (ref 6.4–8.2)
PROT UR STRIP.AUTO-MCNC: ABNORMAL MG/DL
RBC # BLD AUTO: 3.89 M/UL (ref 4.2–5.9)
RBC CLUMPS #/AREA URNS AUTO: 0 /HPF (ref 0–4)
SAO2 % BLDV: 69 %
SODIUM SERPL-SCNC: 129 MMOL/L (ref 136–145)
SP GR UR STRIP.AUTO: 1.03 (ref 1–1.03)
TROPONIN, HIGH SENSITIVITY: 171 NG/L (ref 0–22)
TROPONIN, HIGH SENSITIVITY: 48 NG/L (ref 0–22)
TROPONIN, HIGH SENSITIVITY: 67 NG/L (ref 0–22)
UA COMPLETE W REFLEX CULTURE PNL UR: ABNORMAL
UA DIPSTICK W REFLEX MICRO PNL UR: YES
URN SPEC COLLECT METH UR: ABNORMAL
UROBILINOGEN UR STRIP-ACNC: 0.2 E.U./DL
WBC # BLD AUTO: 9.7 K/UL (ref 4–11)
WBC #/AREA URNS AUTO: 0 /HPF (ref 0–5)

## 2024-07-08 PROCEDURE — 99285 EMERGENCY DEPT VISIT HI MDM: CPT

## 2024-07-08 PROCEDURE — 80307 DRUG TEST PRSMV CHEM ANLYZR: CPT

## 2024-07-08 PROCEDURE — 36415 COLL VENOUS BLD VENIPUNCTURE: CPT

## 2024-07-08 PROCEDURE — 71045 X-RAY EXAM CHEST 1 VIEW: CPT

## 2024-07-08 PROCEDURE — 96374 THER/PROPH/DIAG INJ IV PUSH: CPT

## 2024-07-08 PROCEDURE — 82010 KETONE BODYS QUAN: CPT

## 2024-07-08 PROCEDURE — 80053 COMPREHEN METABOLIC PANEL: CPT

## 2024-07-08 PROCEDURE — 2500000003 HC RX 250 WO HCPCS: Performed by: FAMILY MEDICINE

## 2024-07-08 PROCEDURE — 2000000000 HC ICU R&B

## 2024-07-08 PROCEDURE — 82077 ASSAY SPEC XCP UR&BREATH IA: CPT

## 2024-07-08 PROCEDURE — 96376 TX/PRO/DX INJ SAME DRUG ADON: CPT

## 2024-07-08 PROCEDURE — 81001 URINALYSIS AUTO W/SCOPE: CPT

## 2024-07-08 PROCEDURE — 6370000000 HC RX 637 (ALT 250 FOR IP): Performed by: EMERGENCY MEDICINE

## 2024-07-08 PROCEDURE — 87040 BLOOD CULTURE FOR BACTERIA: CPT

## 2024-07-08 PROCEDURE — 82803 BLOOD GASES ANY COMBINATION: CPT

## 2024-07-08 PROCEDURE — 70450 CT HEAD/BRAIN W/O DYE: CPT

## 2024-07-08 PROCEDURE — 83605 ASSAY OF LACTIC ACID: CPT

## 2024-07-08 PROCEDURE — 6370000000 HC RX 637 (ALT 250 FOR IP): Performed by: FAMILY MEDICINE

## 2024-07-08 PROCEDURE — 96361 HYDRATE IV INFUSION ADD-ON: CPT

## 2024-07-08 PROCEDURE — 93005 ELECTROCARDIOGRAM TRACING: CPT | Performed by: EMERGENCY MEDICINE

## 2024-07-08 PROCEDURE — 2580000003 HC RX 258: Performed by: EMERGENCY MEDICINE

## 2024-07-08 PROCEDURE — 85025 COMPLETE CBC W/AUTO DIFF WBC: CPT

## 2024-07-08 PROCEDURE — 84484 ASSAY OF TROPONIN QUANT: CPT

## 2024-07-08 RX ORDER — PRIMIDONE 50 MG/1
50 TABLET ORAL DAILY
Status: DISCONTINUED | OUTPATIENT
Start: 2024-07-09 | End: 2024-07-11 | Stop reason: HOSPADM

## 2024-07-08 RX ORDER — DEXTROSE MONOHYDRATE, SODIUM CHLORIDE, AND POTASSIUM CHLORIDE 50; 1.49; 4.5 G/1000ML; G/1000ML; G/1000ML
INJECTION, SOLUTION INTRAVENOUS CONTINUOUS PRN
Status: DISCONTINUED | OUTPATIENT
Start: 2024-07-08 | End: 2024-07-09 | Stop reason: SDUPTHER

## 2024-07-08 RX ORDER — LORAZEPAM 2 MG/ML
4 INJECTION INTRAMUSCULAR
Status: DISCONTINUED | OUTPATIENT
Start: 2024-07-08 | End: 2024-07-11 | Stop reason: HOSPADM

## 2024-07-08 RX ORDER — ASPIRIN 81 MG/1
81 TABLET ORAL DAILY
Status: DISCONTINUED | OUTPATIENT
Start: 2024-07-09 | End: 2024-07-11 | Stop reason: HOSPADM

## 2024-07-08 RX ORDER — SODIUM CHLORIDE 0.9 % (FLUSH) 0.9 %
5-40 SYRINGE (ML) INJECTION EVERY 12 HOURS SCHEDULED
Status: DISCONTINUED | OUTPATIENT
Start: 2024-07-09 | End: 2024-07-11 | Stop reason: HOSPADM

## 2024-07-08 RX ORDER — LORAZEPAM 1 MG/1
3 TABLET ORAL
Status: DISCONTINUED | OUTPATIENT
Start: 2024-07-08 | End: 2024-07-11 | Stop reason: HOSPADM

## 2024-07-08 RX ORDER — ATORVASTATIN CALCIUM 20 MG/1
20 TABLET, FILM COATED ORAL DAILY
Status: DISCONTINUED | OUTPATIENT
Start: 2024-07-09 | End: 2024-07-09

## 2024-07-08 RX ORDER — BUPROPION HYDROCHLORIDE 100 MG/1
100 TABLET, EXTENDED RELEASE ORAL 2 TIMES DAILY
Status: DISCONTINUED | OUTPATIENT
Start: 2024-07-08 | End: 2024-07-11 | Stop reason: HOSPADM

## 2024-07-08 RX ORDER — 0.9 % SODIUM CHLORIDE 0.9 %
1000 INTRAVENOUS SOLUTION INTRAVENOUS ONCE
Status: COMPLETED | OUTPATIENT
Start: 2024-07-08 | End: 2024-07-08

## 2024-07-08 RX ORDER — TAMSULOSIN HYDROCHLORIDE 0.4 MG/1
0.4 CAPSULE ORAL DAILY
Status: DISCONTINUED | OUTPATIENT
Start: 2024-07-09 | End: 2024-07-09

## 2024-07-08 RX ORDER — LANOLIN ALCOHOL/MO/W.PET/CERES
20 CREAM (GRAM) TOPICAL NIGHTLY
Status: DISCONTINUED | OUTPATIENT
Start: 2024-07-08 | End: 2024-07-11 | Stop reason: HOSPADM

## 2024-07-08 RX ORDER — MAGNESIUM SULFATE IN WATER 40 MG/ML
2000 INJECTION, SOLUTION INTRAVENOUS PRN
Status: DISCONTINUED | OUTPATIENT
Start: 2024-07-08 | End: 2024-07-11 | Stop reason: HOSPADM

## 2024-07-08 RX ORDER — POTASSIUM CHLORIDE 7.45 MG/ML
10 INJECTION INTRAVENOUS PRN
Status: DISCONTINUED | OUTPATIENT
Start: 2024-07-08 | End: 2024-07-11 | Stop reason: HOSPADM

## 2024-07-08 RX ORDER — M-VIT,TX,IRON,MINS/CALC/FOLIC 27MG-0.4MG
1 TABLET ORAL DAILY
Status: DISCONTINUED | OUTPATIENT
Start: 2024-07-09 | End: 2024-07-11 | Stop reason: HOSPADM

## 2024-07-08 RX ORDER — ENOXAPARIN SODIUM 100 MG/ML
40 INJECTION SUBCUTANEOUS DAILY
Status: DISCONTINUED | OUTPATIENT
Start: 2024-07-09 | End: 2024-07-09

## 2024-07-08 RX ORDER — SODIUM CHLORIDE 0.9 % (FLUSH) 0.9 %
5-40 SYRINGE (ML) INJECTION PRN
Status: DISCONTINUED | OUTPATIENT
Start: 2024-07-08 | End: 2024-07-11 | Stop reason: HOSPADM

## 2024-07-08 RX ORDER — POLYETHYLENE GLYCOL 3350 17 G/17G
17 POWDER, FOR SOLUTION ORAL DAILY PRN
Status: DISCONTINUED | OUTPATIENT
Start: 2024-07-08 | End: 2024-07-11 | Stop reason: HOSPADM

## 2024-07-08 RX ORDER — GAUZE BANDAGE 2" X 2"
100 BANDAGE TOPICAL DAILY
Status: DISCONTINUED | OUTPATIENT
Start: 2024-07-09 | End: 2024-07-11 | Stop reason: HOSPADM

## 2024-07-08 RX ORDER — PROPRANOLOL HCL 60 MG
60 CAPSULE, EXTENDED RELEASE 24HR ORAL DAILY
Status: DISCONTINUED | OUTPATIENT
Start: 2024-07-09 | End: 2024-07-11

## 2024-07-08 RX ORDER — QUETIAPINE FUMARATE 200 MG/1
400 TABLET, FILM COATED ORAL NIGHTLY
Status: DISCONTINUED | OUTPATIENT
Start: 2024-07-08 | End: 2024-07-11 | Stop reason: HOSPADM

## 2024-07-08 RX ORDER — SODIUM CHLORIDE 9 MG/ML
INJECTION, SOLUTION INTRAVENOUS PRN
Status: DISCONTINUED | OUTPATIENT
Start: 2024-07-08 | End: 2024-07-11 | Stop reason: HOSPADM

## 2024-07-08 RX ORDER — LORAZEPAM 1 MG/1
2 TABLET ORAL
Status: DISCONTINUED | OUTPATIENT
Start: 2024-07-08 | End: 2024-07-11 | Stop reason: HOSPADM

## 2024-07-08 RX ORDER — LORAZEPAM 2 MG/ML
2 INJECTION INTRAMUSCULAR
Status: DISCONTINUED | OUTPATIENT
Start: 2024-07-08 | End: 2024-07-11 | Stop reason: HOSPADM

## 2024-07-08 RX ORDER — LORAZEPAM 2 MG/ML
1 INJECTION INTRAMUSCULAR
Status: DISCONTINUED | OUTPATIENT
Start: 2024-07-08 | End: 2024-07-11 | Stop reason: HOSPADM

## 2024-07-08 RX ORDER — LORAZEPAM 1 MG/1
4 TABLET ORAL
Status: DISCONTINUED | OUTPATIENT
Start: 2024-07-08 | End: 2024-07-11 | Stop reason: HOSPADM

## 2024-07-08 RX ORDER — LORAZEPAM 2 MG/ML
3 INJECTION INTRAMUSCULAR
Status: DISCONTINUED | OUTPATIENT
Start: 2024-07-08 | End: 2024-07-11 | Stop reason: HOSPADM

## 2024-07-08 RX ORDER — LORAZEPAM 1 MG/1
1 TABLET ORAL
Status: DISCONTINUED | OUTPATIENT
Start: 2024-07-08 | End: 2024-07-11 | Stop reason: HOSPADM

## 2024-07-08 RX ORDER — SODIUM CHLORIDE 9 MG/ML
INJECTION, SOLUTION INTRAVENOUS CONTINUOUS
Status: DISCONTINUED | OUTPATIENT
Start: 2024-07-09 | End: 2024-07-09 | Stop reason: ALTCHOICE

## 2024-07-08 RX ORDER — 0.9 % SODIUM CHLORIDE 0.9 %
15 INTRAVENOUS SOLUTION INTRAVENOUS ONCE
Status: DISCONTINUED | OUTPATIENT
Start: 2024-07-08 | End: 2024-07-11 | Stop reason: HOSPADM

## 2024-07-08 RX ADMIN — Medication 19.5 MG: at 23:34

## 2024-07-08 RX ADMIN — SODIUM CHLORIDE 1000 ML: 9 INJECTION, SOLUTION INTRAVENOUS at 17:27

## 2024-07-08 RX ADMIN — BUPROPION HYDROCHLORIDE 100 MG: 100 TABLET, FILM COATED, EXTENDED RELEASE ORAL at 23:34

## 2024-07-08 RX ADMIN — QUETIAPINE FUMARATE 400 MG: 200 TABLET ORAL at 23:34

## 2024-07-08 RX ADMIN — POTASSIUM CHLORIDE, DEXTROSE MONOHYDRATE AND SODIUM CHLORIDE: 150; 5; 450 INJECTION, SOLUTION INTRAVENOUS at 22:57

## 2024-07-08 RX ADMIN — SODIUM CHLORIDE 9.6 UNITS/HR: 9 INJECTION, SOLUTION INTRAVENOUS at 22:05

## 2024-07-08 RX ADMIN — SODIUM CHLORIDE 9.9 UNITS/HR: 9 INJECTION, SOLUTION INTRAVENOUS at 20:41

## 2024-07-08 RX ADMIN — HUMAN INSULIN 10 UNITS: 100 INJECTION, SOLUTION SUBCUTANEOUS at 19:47

## 2024-07-08 RX ADMIN — SODIUM CHLORIDE 1000 ML: 9 INJECTION, SOLUTION INTRAVENOUS at 19:46

## 2024-07-08 ASSESSMENT — PAIN DESCRIPTION - ORIENTATION: ORIENTATION: LEFT

## 2024-07-08 ASSESSMENT — PAIN DESCRIPTION - LOCATION: LOCATION: FOOT

## 2024-07-08 ASSESSMENT — PAIN SCALES - GENERAL
PAINLEVEL_OUTOF10: 0
PAINLEVEL_OUTOF10: 4

## 2024-07-08 NOTE — ED PROVIDER NOTES
[facility] EMERGENCY DEPARTMENT  EMERGENCY DEPARTMENT ENCOUNTER      Pt Name: Don Tee  MRN: [id number]  Birthdate 1955  Date of evaluation: 7/8/2024  Provider: YAZMIN STACK DO    CHIEF COMPLAINT  Chief Complaint   Patient presents with    Fatigue     Pt in via EMS from home with generalized weakness that started on way back from store \"around 1600\". Pt states that he uses \"wheelchair at motorized 3 whitney\" to move around. Pt alert and oriented at this time with no signs of distress noted. NIH 0 in triage. EMS reports patient's blood sugar was \"411 en route\". Pt states that he hasn't been taking his insulin/pills for a \"few days\".         This patient is at risk for a communicable infection.  Therefore, personal protection equipment consisting of a mask was worn for the exam.    HPI  Don Tee is a 68 y.o. male who presents with generalized weakness that started at an unknown time.  He refused to answer questions.  He states, \"I do not watch my watch.  When asked him to approximate the time as to morning noon or night he was not able to approximate a time.  He denies any previous history of symptoms like this.  He states he has a history of dependent edema but does not take a water pill.  He denies any chest pains or stomach pain at this time.  Denies any shortness of breath.  Nothing makes it better or worse.  He describes it as moderate.  Patient states he has not been taking his pills or insulin because he is tired of taking medicines.  He does not have for a few weeks.    REVIEW OF SYSTEMS  All systems negative except as noted in the HPI.  Reviewed Nurses' notes and concur.   History limited due to poor historian and reluctant to give any information.    No LMP for male patient.    PAST MEDICAL HISTORY  Past Medical History:   Diagnosis Date    Allergic rhinitis     arthri     Arthritis     lumbar spine    CAD (coronary artery disease)     Chronic kidney disease     retention,    sodium chloride 0.9 % bolus 1,000 mL (has no administration in time range)   insulin regular (HUMULIN R;NOVOLIN R) injection 10 Units (has no administration in time range)   sodium chloride 0.9 % bolus 1,000 mL (0 mLs IntraVENous Stopped 7/8/24 1830)       New Prescriptions    No medications on file       SEP-1 CORE MEASURE DATA  Exclusion criteria: the patient is NOT to be included for sepsis due to:  Infection is not suspected    Patient remained stable in the ED. Generalized weakness that started at 2:30 PM according to the nurse but patient was nonspecific when I talk to him.  He told me he does not know what time it started he does not wear a watch and cannot give me an estimated time.  Denies any chest pain shortness of breath or stomach pain just feels weak all over.  He has not been taking his medications because he is tired of taking pills and insulin.  He denies ever being in DKA.  EKG showed inverted T waves in the lateral leads which appear to be LVH with early repolarization abnormalities but is different from his previous EKG.  His repeat EKG looks improved from his first EKG. his troponin was 48 but his repeat was 67.  Patient just stated again  that he is having no chest pain or shortness of breath.  Sodium is 129 with a chloride of 88.  CO2 is 15.  Lactic acid is negative.  His blood sugar is 511.   beta hydroxybutyrate is 5.5.  I am starting the patient on an insulin drip.  Patient was given 2 L of IV fluids and Humulin R 10 units IV.  Hospitalist was notified admission at 2005    Diagnostic considerations include but are not limited to:  Ménière's disease, benign positional vertigo, stroke or TIA in the posterior circulation, bleed of the cerebellopontine angle, cardiac arrhythmia, anemia, dehydration, sepsis, Metabolic emergency, Multiple Sclerosis, brain or ENT tumor, other    EKG-ordered to rule out myocardial infarction, rhythm disturbances, conduction disturbances, LVH, HERIBERTO, pericarditis,

## 2024-07-08 NOTE — CARE COORDINATION
Ambulatory Care Coordination Note     7/8/2024 11:29 AM     Patient outreach attempt by this ACM today to perform care management follow up . ACM was unable to reach the patient by telephone today; left voice message requesting a return phone call to this ACM.     ACM: Sandi Menchaca RN     Care Summary Note:    ACM follow up; medicaid application, prep for discharge    PCP/Specialist follow up:       Follow Up:   Plan for next ACM outreach in approximately 2 weeks to complete:  Medicaid application, prep for discharge .

## 2024-07-09 PROBLEM — I21.4 NSTEMI (NON-ST ELEVATED MYOCARDIAL INFARCTION) (HCC): Status: ACTIVE | Noted: 2024-07-09

## 2024-07-09 LAB
ANION GAP SERPL CALCULATED.3IONS-SCNC: 10 MMOL/L (ref 3–16)
ANION GAP SERPL CALCULATED.3IONS-SCNC: 9 MMOL/L (ref 3–16)
ANION GAP SERPL CALCULATED.3IONS-SCNC: 9 MMOL/L (ref 3–16)
ANTI-XA UNFRAC HEPARIN: 0.13 IU/ML (ref 0.3–0.7)
ANTI-XA UNFRAC HEPARIN: 0.32 IU/ML (ref 0.3–0.7)
ANTI-XA UNFRAC HEPARIN: <0.1 IU/ML (ref 0.3–0.7)
APTT BLD: 29.9 SEC (ref 22.1–36.4)
BASOPHILS # BLD: 0.1 K/UL (ref 0–0.2)
BASOPHILS NFR BLD: 0.6 %
BUN SERPL-MCNC: 13 MG/DL (ref 7–20)
BUN SERPL-MCNC: 14 MG/DL (ref 7–20)
BUN SERPL-MCNC: 16 MG/DL (ref 7–20)
CALCIUM SERPL-MCNC: 8.7 MG/DL (ref 8.3–10.6)
CALCIUM SERPL-MCNC: 8.8 MG/DL (ref 8.3–10.6)
CALCIUM SERPL-MCNC: 8.9 MG/DL (ref 8.3–10.6)
CHLORIDE SERPL-SCNC: 102 MMOL/L (ref 99–110)
CHLORIDE SERPL-SCNC: 102 MMOL/L (ref 99–110)
CHLORIDE SERPL-SCNC: 104 MMOL/L (ref 99–110)
CO2 SERPL-SCNC: 25 MMOL/L (ref 21–32)
CO2 SERPL-SCNC: 26 MMOL/L (ref 21–32)
CO2 SERPL-SCNC: 26 MMOL/L (ref 21–32)
CREAT SERPL-MCNC: 0.6 MG/DL (ref 0.8–1.3)
CREAT SERPL-MCNC: <0.5 MG/DL (ref 0.8–1.3)
CREAT SERPL-MCNC: <0.5 MG/DL (ref 0.8–1.3)
DEPRECATED RDW RBC AUTO: 14.2 % (ref 12.4–15.4)
EKG ATRIAL RATE: 78 BPM
EKG ATRIAL RATE: 91 BPM
EKG DIAGNOSIS: NORMAL
EKG DIAGNOSIS: NORMAL
EKG P AXIS: 82 DEGREES
EKG P AXIS: 87 DEGREES
EKG P-R INTERVAL: 192 MS
EKG P-R INTERVAL: 224 MS
EKG Q-T INTERVAL: 382 MS
EKG Q-T INTERVAL: 406 MS
EKG QRS DURATION: 102 MS
EKG QRS DURATION: 102 MS
EKG QTC CALCULATION (BAZETT): 462 MS
EKG QTC CALCULATION (BAZETT): 469 MS
EKG R AXIS: -69 DEGREES
EKG R AXIS: -71 DEGREES
EKG T AXIS: 108 DEGREES
EKG T AXIS: 206 DEGREES
EKG VENTRICULAR RATE: 78 BPM
EKG VENTRICULAR RATE: 91 BPM
EOSINOPHIL # BLD: 0.1 K/UL (ref 0–0.6)
EOSINOPHIL NFR BLD: 1.1 %
EST. AVERAGE GLUCOSE BLD GHB EST-MCNC: 389.5 MG/DL
GFR SERPLBLD CREATININE-BSD FMLA CKD-EPI: >90 ML/MIN/{1.73_M2}
GLUCOSE BLD-MCNC: 112 MG/DL (ref 70–99)
GLUCOSE BLD-MCNC: 118 MG/DL (ref 70–99)
GLUCOSE BLD-MCNC: 120 MG/DL (ref 70–99)
GLUCOSE BLD-MCNC: 125 MG/DL (ref 70–99)
GLUCOSE BLD-MCNC: 125 MG/DL (ref 70–99)
GLUCOSE BLD-MCNC: 130 MG/DL (ref 70–99)
GLUCOSE BLD-MCNC: 144 MG/DL (ref 70–99)
GLUCOSE BLD-MCNC: 152 MG/DL (ref 70–99)
GLUCOSE BLD-MCNC: 160 MG/DL (ref 70–99)
GLUCOSE BLD-MCNC: 163 MG/DL (ref 70–99)
GLUCOSE BLD-MCNC: 174 MG/DL (ref 70–99)
GLUCOSE BLD-MCNC: 254 MG/DL (ref 70–99)
GLUCOSE BLD-MCNC: 276 MG/DL (ref 70–99)
GLUCOSE SERPL-MCNC: 108 MG/DL (ref 70–99)
GLUCOSE SERPL-MCNC: 128 MG/DL (ref 70–99)
GLUCOSE SERPL-MCNC: 143 MG/DL (ref 70–99)
HBA1C MFR BLD: 15.2 %
HCT VFR BLD AUTO: 32.9 % (ref 40.5–52.5)
HGB BLD-MCNC: 11.1 G/DL (ref 13.5–17.5)
LYMPHOCYTES # BLD: 2.4 K/UL (ref 1–5.1)
LYMPHOCYTES NFR BLD: 25.6 %
MAGNESIUM SERPL-MCNC: 1.6 MG/DL (ref 1.8–2.4)
MAGNESIUM SERPL-MCNC: 1.9 MG/DL (ref 1.8–2.4)
MAGNESIUM SERPL-MCNC: 2.1 MG/DL (ref 1.8–2.4)
MCH RBC QN AUTO: 31.6 PG (ref 26–34)
MCHC RBC AUTO-ENTMCNC: 33.8 G/DL (ref 31–36)
MCV RBC AUTO: 93.6 FL (ref 80–100)
MONOCYTES # BLD: 1 K/UL (ref 0–1.3)
MONOCYTES NFR BLD: 10.1 %
NEUTROPHILS # BLD: 6 K/UL (ref 1.7–7.7)
NEUTROPHILS NFR BLD: 62.6 %
PERFORMED ON: ABNORMAL
PHOSPHATE SERPL-MCNC: 2.4 MG/DL (ref 2.5–4.9)
PHOSPHATE SERPL-MCNC: 2.5 MG/DL (ref 2.5–4.9)
PHOSPHATE SERPL-MCNC: 2.9 MG/DL (ref 2.5–4.9)
PLATELET # BLD AUTO: 216 K/UL (ref 135–450)
PMV BLD AUTO: 8.8 FL (ref 5–10.5)
POTASSIUM SERPL-SCNC: 3.3 MMOL/L (ref 3.5–5.1)
POTASSIUM SERPL-SCNC: 3.3 MMOL/L (ref 3.5–5.1)
POTASSIUM SERPL-SCNC: 3.6 MMOL/L (ref 3.5–5.1)
RBC # BLD AUTO: 3.52 M/UL (ref 4.2–5.9)
SODIUM SERPL-SCNC: 137 MMOL/L (ref 136–145)
SODIUM SERPL-SCNC: 138 MMOL/L (ref 136–145)
SODIUM SERPL-SCNC: 138 MMOL/L (ref 136–145)
TROPONIN, HIGH SENSITIVITY: 178 NG/L (ref 0–22)
TROPONIN, HIGH SENSITIVITY: 192 NG/L (ref 0–22)
WBC # BLD AUTO: 9.5 K/UL (ref 4–11)

## 2024-07-09 PROCEDURE — 87205 SMEAR GRAM STAIN: CPT

## 2024-07-09 PROCEDURE — 36415 COLL VENOUS BLD VENIPUNCTURE: CPT

## 2024-07-09 PROCEDURE — 6360000002 HC RX W HCPCS: Performed by: FAMILY MEDICINE

## 2024-07-09 PROCEDURE — 83036 HEMOGLOBIN GLYCOSYLATED A1C: CPT

## 2024-07-09 PROCEDURE — 87070 CULTURE OTHR SPECIMN AEROBIC: CPT

## 2024-07-09 PROCEDURE — 2500000003 HC RX 250 WO HCPCS: Performed by: FAMILY MEDICINE

## 2024-07-09 PROCEDURE — 83735 ASSAY OF MAGNESIUM: CPT

## 2024-07-09 PROCEDURE — 6370000000 HC RX 637 (ALT 250 FOR IP): Performed by: REGISTERED NURSE

## 2024-07-09 PROCEDURE — 99222 1ST HOSP IP/OBS MODERATE 55: CPT | Performed by: NURSE PRACTITIONER

## 2024-07-09 PROCEDURE — 93010 ELECTROCARDIOGRAM REPORT: CPT | Performed by: INTERNAL MEDICINE

## 2024-07-09 PROCEDURE — 1200000000 HC SEMI PRIVATE

## 2024-07-09 PROCEDURE — 94760 N-INVAS EAR/PLS OXIMETRY 1: CPT

## 2024-07-09 PROCEDURE — 6370000000 HC RX 637 (ALT 250 FOR IP)

## 2024-07-09 PROCEDURE — 2580000003 HC RX 258: Performed by: FAMILY MEDICINE

## 2024-07-09 PROCEDURE — 2580000003 HC RX 258: Performed by: REGISTERED NURSE

## 2024-07-09 PROCEDURE — 6370000000 HC RX 637 (ALT 250 FOR IP): Performed by: FAMILY MEDICINE

## 2024-07-09 PROCEDURE — 85025 COMPLETE CBC W/AUTO DIFF WBC: CPT

## 2024-07-09 PROCEDURE — 2580000003 HC RX 258: Performed by: STUDENT IN AN ORGANIZED HEALTH CARE EDUCATION/TRAINING PROGRAM

## 2024-07-09 PROCEDURE — 87186 SC STD MICRODIL/AGAR DIL: CPT

## 2024-07-09 PROCEDURE — 6370000000 HC RX 637 (ALT 250 FOR IP): Performed by: STUDENT IN AN ORGANIZED HEALTH CARE EDUCATION/TRAINING PROGRAM

## 2024-07-09 PROCEDURE — 85520 HEPARIN ASSAY: CPT

## 2024-07-09 PROCEDURE — 80048 BASIC METABOLIC PNL TOTAL CA: CPT

## 2024-07-09 PROCEDURE — 84484 ASSAY OF TROPONIN QUANT: CPT

## 2024-07-09 PROCEDURE — 87077 CULTURE AEROBIC IDENTIFY: CPT

## 2024-07-09 PROCEDURE — 84100 ASSAY OF PHOSPHORUS: CPT

## 2024-07-09 PROCEDURE — 85730 THROMBOPLASTIN TIME PARTIAL: CPT

## 2024-07-09 PROCEDURE — 6360000002 HC RX W HCPCS: Performed by: STUDENT IN AN ORGANIZED HEALTH CARE EDUCATION/TRAINING PROGRAM

## 2024-07-09 RX ORDER — MIDODRINE HYDROCHLORIDE 5 MG/1
5 TABLET ORAL ONCE
Status: COMPLETED | OUTPATIENT
Start: 2024-07-09 | End: 2024-07-09

## 2024-07-09 RX ORDER — 0.9 % SODIUM CHLORIDE 0.9 %
500 INTRAVENOUS SOLUTION INTRAVENOUS ONCE
Status: COMPLETED | OUTPATIENT
Start: 2024-07-09 | End: 2024-07-10

## 2024-07-09 RX ORDER — INSULIN GLARGINE 100 [IU]/ML
20 INJECTION, SOLUTION SUBCUTANEOUS EVERY MORNING
Status: DISCONTINUED | OUTPATIENT
Start: 2024-07-10 | End: 2024-07-10

## 2024-07-09 RX ORDER — HEPARIN SODIUM 1000 [USP'U]/ML
30 INJECTION, SOLUTION INTRAVENOUS; SUBCUTANEOUS PRN
Status: DISCONTINUED | OUTPATIENT
Start: 2024-07-09 | End: 2024-07-09

## 2024-07-09 RX ORDER — ATORVASTATIN CALCIUM 20 MG/1
20 TABLET, FILM COATED ORAL NIGHTLY
Status: DISCONTINUED | OUTPATIENT
Start: 2024-07-10 | End: 2024-07-11 | Stop reason: HOSPADM

## 2024-07-09 RX ORDER — HEPARIN SODIUM 1000 [USP'U]/ML
60 INJECTION, SOLUTION INTRAVENOUS; SUBCUTANEOUS PRN
Status: DISCONTINUED | OUTPATIENT
Start: 2024-07-09 | End: 2024-07-09

## 2024-07-09 RX ORDER — HEPARIN SODIUM 1000 [USP'U]/ML
60 INJECTION, SOLUTION INTRAVENOUS; SUBCUTANEOUS ONCE
Status: COMPLETED | OUTPATIENT
Start: 2024-07-09 | End: 2024-07-09

## 2024-07-09 RX ORDER — GLUCAGON 1 MG/ML
1 KIT INJECTION PRN
Status: DISCONTINUED | OUTPATIENT
Start: 2024-07-09 | End: 2024-07-11 | Stop reason: HOSPADM

## 2024-07-09 RX ORDER — TAMSULOSIN HYDROCHLORIDE 0.4 MG/1
0.4 CAPSULE ORAL NIGHTLY
Status: DISCONTINUED | OUTPATIENT
Start: 2024-07-09 | End: 2024-07-11 | Stop reason: HOSPADM

## 2024-07-09 RX ORDER — ACYCLOVIR 400 MG/1
1 TABLET ORAL
Qty: 3 EACH | Refills: 3 | Status: SHIPPED | OUTPATIENT
Start: 2024-07-09

## 2024-07-09 RX ORDER — DEXTROSE MONOHYDRATE 100 MG/ML
INJECTION, SOLUTION INTRAVENOUS CONTINUOUS PRN
Status: DISCONTINUED | OUTPATIENT
Start: 2024-07-09 | End: 2024-07-11 | Stop reason: HOSPADM

## 2024-07-09 RX ORDER — INSULIN GLARGINE 100 [IU]/ML
20 INJECTION, SOLUTION SUBCUTANEOUS 2 TIMES DAILY
Status: DISCONTINUED | OUTPATIENT
Start: 2024-07-09 | End: 2024-07-09

## 2024-07-09 RX ORDER — INSULIN LISPRO 100 [IU]/ML
0-4 INJECTION, SOLUTION INTRAVENOUS; SUBCUTANEOUS NIGHTLY
Status: DISCONTINUED | OUTPATIENT
Start: 2024-07-09 | End: 2024-07-11 | Stop reason: HOSPADM

## 2024-07-09 RX ORDER — HEPARIN SODIUM 1000 [USP'U]/ML
3640 INJECTION, SOLUTION INTRAVENOUS; SUBCUTANEOUS ONCE
Status: COMPLETED | OUTPATIENT
Start: 2024-07-09 | End: 2024-07-09

## 2024-07-09 RX ORDER — INSULIN LISPRO 100 [IU]/ML
0-4 INJECTION, SOLUTION INTRAVENOUS; SUBCUTANEOUS
Status: DISCONTINUED | OUTPATIENT
Start: 2024-07-09 | End: 2024-07-11 | Stop reason: HOSPADM

## 2024-07-09 RX ORDER — HEPARIN SODIUM 1000 [USP'U]/ML
1800 INJECTION, SOLUTION INTRAVENOUS; SUBCUTANEOUS ONCE
Status: COMPLETED | OUTPATIENT
Start: 2024-07-09 | End: 2024-07-09

## 2024-07-09 RX ORDER — HEPARIN SODIUM 10000 [USP'U]/100ML
0-4000 INJECTION, SOLUTION INTRAVENOUS CONTINUOUS
Status: DISPENSED | OUTPATIENT
Start: 2024-07-09 | End: 2024-07-11

## 2024-07-09 RX ORDER — ACYCLOVIR 400 MG/1
1 TABLET ORAL CONTINUOUS
Qty: 1 EACH | Refills: 0 | Status: SHIPPED | OUTPATIENT
Start: 2024-07-09

## 2024-07-09 RX ADMIN — HEPARIN SODIUM 3600 UNITS: 1000 INJECTION INTRAVENOUS; SUBCUTANEOUS at 01:57

## 2024-07-09 RX ADMIN — POTASSIUM CHLORIDE 10 MEQ: 7.46 INJECTION, SOLUTION INTRAVENOUS at 10:30

## 2024-07-09 RX ADMIN — PROPRANOLOL HYDROCHLORIDE 60 MG: 60 CAPSULE, EXTENDED RELEASE ORAL at 10:32

## 2024-07-09 RX ADMIN — Medication 1 TABLET: at 10:31

## 2024-07-09 RX ADMIN — Medication 5000 UNITS: at 10:30

## 2024-07-09 RX ADMIN — SODIUM CHLORIDE, PRESERVATIVE FREE 10 ML: 5 INJECTION INTRAVENOUS at 20:48

## 2024-07-09 RX ADMIN — SODIUM PHOSPHATE, MONOBASIC, MONOHYDRATE AND SODIUM PHOSPHATE, DIBASIC, ANHYDROUS 15 MMOL: 142; 276 INJECTION, SOLUTION INTRAVENOUS at 03:57

## 2024-07-09 RX ADMIN — Medication 19.5 MG: at 20:47

## 2024-07-09 RX ADMIN — ASPIRIN 81 MG: 81 TABLET, COATED ORAL at 10:30

## 2024-07-09 RX ADMIN — SODIUM CHLORIDE 500 ML: 9 INJECTION, SOLUTION INTRAVENOUS at 23:34

## 2024-07-09 RX ADMIN — VANCOMYCIN HYDROCHLORIDE 1000 MG: 1 INJECTION, POWDER, LYOPHILIZED, FOR SOLUTION INTRAVENOUS at 16:38

## 2024-07-09 RX ADMIN — Medication: at 17:20

## 2024-07-09 RX ADMIN — POTASSIUM CHLORIDE 10 MEQ: 7.46 INJECTION, SOLUTION INTRAVENOUS at 03:19

## 2024-07-09 RX ADMIN — MIDODRINE HYDROCHLORIDE 5 MG: 5 TABLET ORAL at 23:47

## 2024-07-09 RX ADMIN — POTASSIUM CHLORIDE 10 MEQ: 7.46 INJECTION, SOLUTION INTRAVENOUS at 06:27

## 2024-07-09 RX ADMIN — HEPARIN SODIUM 1800 UNITS: 1000 INJECTION INTRAVENOUS; SUBCUTANEOUS at 22:36

## 2024-07-09 RX ADMIN — HEPARIN SODIUM 3600 UNITS: 1000 INJECTION INTRAVENOUS; SUBCUTANEOUS at 09:40

## 2024-07-09 RX ADMIN — CEFEPIME 2000 MG: 2 INJECTION, POWDER, FOR SOLUTION INTRAVENOUS at 15:09

## 2024-07-09 RX ADMIN — BUPROPION HYDROCHLORIDE 100 MG: 100 TABLET, FILM COATED, EXTENDED RELEASE ORAL at 20:47

## 2024-07-09 RX ADMIN — TAMSULOSIN HYDROCHLORIDE 0.4 MG: 0.4 CAPSULE ORAL at 20:47

## 2024-07-09 RX ADMIN — HEPARIN SODIUM 730 UNITS/HR: 10000 INJECTION, SOLUTION INTRAVENOUS at 02:01

## 2024-07-09 RX ADMIN — PRIMIDONE 50 MG: 50 TABLET ORAL at 10:33

## 2024-07-09 RX ADMIN — INSULIN LISPRO 2 UNITS: 100 INJECTION, SOLUTION INTRAVENOUS; SUBCUTANEOUS at 17:16

## 2024-07-09 RX ADMIN — POTASSIUM CHLORIDE 10 MEQ: 7.46 INJECTION, SOLUTION INTRAVENOUS at 08:17

## 2024-07-09 RX ADMIN — BUPROPION HYDROCHLORIDE 100 MG: 100 TABLET, FILM COATED, EXTENDED RELEASE ORAL at 10:32

## 2024-07-09 RX ADMIN — MAGNESIUM SULFATE HEPTAHYDRATE 2000 MG: 40 INJECTION, SOLUTION INTRAVENOUS at 02:47

## 2024-07-09 RX ADMIN — POTASSIUM CHLORIDE, DEXTROSE MONOHYDRATE AND SODIUM CHLORIDE: 150; 5; 450 INJECTION, SOLUTION INTRAVENOUS at 05:25

## 2024-07-09 RX ADMIN — QUETIAPINE FUMARATE 400 MG: 200 TABLET ORAL at 20:47

## 2024-07-09 RX ADMIN — INSULIN GLARGINE 20 UNITS: 100 INJECTION, SOLUTION SUBCUTANEOUS at 09:42

## 2024-07-09 RX ADMIN — POTASSIUM CHLORIDE 10 MEQ: 7.46 INJECTION, SOLUTION INTRAVENOUS at 05:26

## 2024-07-09 ASSESSMENT — PAIN SCALES - GENERAL
PAINLEVEL_OUTOF10: 0

## 2024-07-09 NOTE — PROGRESS NOTES
NAME:  Don Tee  YOB: 1955  MEDICAL RECORD NUMBER:  [id number]    Shift Summary: Insulin gtt stopped at 1144. Cardiology s/o, pt refusing heart cath. Continue heparin gtt. Palliative care consulted. Downgraded to medsurge. Wound care orders received for left foot and left hip wound.     Family updated: No    Rhythm: Normal Sinus Rhythm     Most recent vitals:   Visit Vitals  BP (!) 110/53   Pulse 81   Temp 98 °F (36.7 °C) (Oral)   Resp 15   Ht 1.829 m (6')   Wt 60.6 kg (133 lb 9.6 oz)   SpO2 98%   BMI 18.12 kg/m²           No data found.    No data found.      Respiratory support needed (if any):  - RA    Admission weight Weight - Scale: 64.8 kg (142 lb 13.7 oz) (07/08/24 1620)    Today's weight    Wt Readings from Last 1 Encounters:   07/08/24 60.6 kg (133 lb 9.6 oz)        Rouse need assessed each shift: N/A - no rouse present  UOP >30ml/hr: YES  Last documented BM (in last 48 hrs):  No data found.             Restraints (in use currently or dc'd in last 12 hrs): No    Order current and documentation up to date? Yes    Lines/Drains reviewed @ bedside.  Peripheral IV 07/08/24 Right Antecubital (Active)   Number of days: 1       Peripheral IV 07/09/24 Right;Anterior Forearm (Active)   Number of days: 0       Peripheral IV 07/09/24 Left;Anterior Forearm (Active)   Number of days: 0         Drip rates at handoff:    heparin (PORCINE) Infusion 970 Units/hr (07/09/24 0941)    dextrose      insulin Stopped (07/09/24 1144)    sodium chloride      sodium chloride         Lab Data:   CBC:   Recent Labs     07/08/24  1651 07/09/24  0332   WBC 9.7 9.5   HGB 12.3* 11.1*   HCT 37.9* 32.9*   MCV 97.3 93.6    216     BMP:    Recent Labs     07/09/24  0333 07/09/24  0607    138   K 3.3* 3.6   CO2 26 25   BUN 14 13   CREATININE <0.5* <0.5*     LIVR:   Recent Labs     07/08/24  1651   AST 14*   ALT 13     PT/INR: No results for input(s): \"INR\" in the last 72 hours.    Invalid input(s):

## 2024-07-09 NOTE — CARE COORDINATION
(laundry)  Current functional level: Assistance with the following:, Bathing, Dressing, Housework, Mobility, Other (see comment) (Laundry)    PT AM-PAC:   /24  OT AM-PAC:   /24    Family can provide assistance at DC: No  Would you like Case Management to discuss the discharge plan with any other family members/significant others, and if so, who? No  Plans to Return to Present Housing: Unknown at present  Other Identified Issues/Barriers to RETURNING to current housing: None  Potential Assistance needed at discharge: Skilled Nursing Facility, Home Care, Transportation            Potential DME:  N/A  Patient expects to discharge to: House  Plan for transportation at discharge: Wheelchair Van    Financial    Payor: AET[employer] MEDICARE / Plan: AETNA MEDICARE ADVANTAGE HMO / Product Type: Medicare /     Does insurance require precert for SNF: Yes    Potential assistance Purchasing Medications: No  Meds-to-Beds request: Yes      [facility] [address] [place] [address] - P [phone] - F [phone]  [address] [place] [address]  Phone: [phone] Fax: [phone]    [facility] [place] [address] - P [phone] - F [phone]  [address] [facility] [address]  Phone: [phone] Fax: [phone]      Notes:    Factors facilitating achievement of predicted outcomes: Friend support    Barriers to discharge: Heparin gtt    Additional Case Management Notes: Discharge plan is to return to home alone. Does not have any family. Has a ramp, wheelchair, and an electric scooter he uses when out of the home (states it is falling apart and needs replaced, 10-11 yo).     Active with the [employer] for an Aide for 3 hrs per week for housekeeping services. Is having issues with them showing up.     Will need transportation to home at discharge.     Has refused cardiac services or treatment.     The Plan for Transition of Care is related to the following treatment goals of  Dehydration [E86.0]  Metabolic acidosis [E87.20]  Hyperglycemia [R73.9]  Generalized weakness [R53.1]  Elevated troponin [R79.89]  DKA, type 2, not at goal (HCC) [E11.10]  Diabetic ketoacidosis without coma associated with type 2 diabetes mellitus (HCC) [E11.10]    IF APPLICABLE: The Patient and/or patient representative Don and his family were provided with a choice of provider and agrees with the discharge plan. Freedom of choice list with basic dialogue that supports the patient's individualized plan of care/goals and shares the quality data associated with the providers was provided to:     Patient Representative Name:       The Patient and/or Patient Representative Agree with the Discharge Plan? Yes    Deepti Menjivar RN  Case Management Department  Ph: [phone] Fax: [phone]

## 2024-07-09 NOTE — PROGRESS NOTES
Patient repeat troponin elevated to 171, MD made aware, orders for heparin gtt and consult to cardiology.     Electronically signed by Brenda Whittington RN on 7/9/2024 at 1:31 AM

## 2024-07-09 NOTE — CONSULTS
Clinical Pharmacy Note  Vancomycin Consult    Don Tee is a 68 y.o. male ordered vancomycin for SSTI; consult received from Dr. Dailey to manage therapy. Also receiving Cefepime.    Allergies:  Metoclopramide and No known allergies     Temp max:  Temp (24hrs), Av °F (36.1 °C), Min:96.2 °F (35.7 °C), Max:97.8 °F (36.6 °C)      Recent Labs     24  1651 24  0332   WBC 9.7 9.5       Recent Labs     24  0123 24  0333 24  0607   BUN 16 14 13   CREATININE 0.6* <0.5* <0.5*         Intake/Output Summary (Last 24 hours) at 2024 1607  Last data filed at 2024 1148  Gross per 24 hour   Intake 2578.09 ml   Output 1600 ml   Net 978.09 ml       Culture Results:  pending    Ht Readings from Last 1 Encounters:   24 1.829 m (6')        Wt Readings from Last 1 Encounters:   24 60.6 kg (133 lb 9.6 oz)         Estimated Creatinine Clearance: 120 mL/min (based on SCr of 0.5 mg/dL).    Assessment/Plan:  Day # 1 of vancomycin.  Vancomycin 1000 mg IV every 8 hours.    Goal -600  Predicted  (24-48 hr)  Level ordered for 1300 on 7/10 (after 3 total doses)    Thank you for the consult.     Laisha Veras, PharmD, BCPS  2024 4:08 PM

## 2024-07-09 NOTE — PROGRESS NOTES
Message sent to Dr Alton Dailey updating him on pt's labs. Orders received to transition pt off insulin gtt.

## 2024-07-09 NOTE — PLAN OF CARE
Problem: Skin/Tissue Integrity  Goal: Absence of new skin breakdown  Description: 1.  Monitor for areas of redness and/or skin breakdown  2.  Assess vascular access sites hourly  3.  Every 4-6 hours minimum:  Change oxygen saturation probe site  4.  Every 4-6 hours:  If on nasal continuous positive airway pressure, respiratory therapy assess nares and determine need for appliance change or resting period.  7/9/2024 1323 by Adriane Ochoa, RN  Outcome: Progressing  7/9/2024 1319 by Adirane Ochoa, RN  Outcome: Progressing     Problem: Pain  Goal: Verbalizes/displays adequate comfort level or baseline comfort level  7/9/2024 1323 by Adriane Ochoa RN  Outcome: Progressing  Flowsheets (Taken 7/9/2024 1323)  Verbalizes/displays adequate comfort level or baseline comfort level:   Encourage patient to monitor pain and request assistance   Consider cultural and social influences on pain and pain management   Assess pain using appropriate pain scale   Implement non-pharmacological measures as appropriate and evaluate response   Notify Licensed Independent Practitioner if interventions unsuccessful or patient reports new pain  7/9/2024 1319 by Adriane Ochoa, RN  Outcome: Progressing  Flowsheets (Taken 7/9/2024 0400 by Brenda Whittington RN)  Verbalizes/displays adequate comfort level or baseline comfort level:   Encourage patient to monitor pain and request assistance   Assess pain using appropriate pain scale   Administer analgesics based on type and severity of pain and evaluate response   Implement non-pharmacological measures as appropriate and evaluate response   Consider cultural and social influences on pain and pain management   Notify Licensed Independent Practitioner if interventions unsuccessful or patient reports new pain     Problem: Safety - Adult  Goal: Free from fall injury  7/9/2024 1323 by Adriane Ochoa, RN  Outcome: Progressing  Flowsheets (Taken 7/9/2024 1323)  Free From Fall Injury: Instruct family/caregiver

## 2024-07-09 NOTE — PROGRESS NOTES
Clinical Pharmacy Note  Heparin Dosing Consult    Don Tee is a 68 y.o. male ordered heparin per CAD/STEMI/NSTEMI/UA/AFIB nomogram by Dr. Kurtz.     No results found for: \"APTT\"  Lab Results   Component Value Date/Time    HGB 12.3 07/08/2024 04:51 PM    HCT 37.9 07/08/2024 04:51 PM     07/08/2024 04:51 PM       Ht Readings from Last 1 Encounters:   07/08/24 1.829 m (6')        Wt Readings from Last 1 Encounters:   07/08/24 60.6 kg (133 lb 9.6 oz)        Assessment/Plan:  Initial bolus: 3600 units  Initial infusion rate: 730 units/hr  Next anti-Xa:: 0800 07/09/24    Pharmacy will continue to monitor adjust heparin based on anti-Xa results using nomogram below:     CAD/STEMI/NSTEMI/UA/AFIB Heparin Nomogram     Initial Bolus: 60 units/kg Max Bolus: 4,000 units       Initial Rate: 12 units/kg/hr Max Initial Rate: 1,000 units/hr     anti-Xa Bolus Titration   < 0.1 Heparin 60 units/kg bolus Increase drip by 4 units/kg/hr   0.1 - 0.29 Heparin 30 units/kg bolus Increase drip by 2 units/kg/hr   0.3 - 0.7 No Bolus No Change   0.71 - 0.8 No Bolus Decrease drip by 1 units/kg/hr   0.81 - 0.99 No Bolus Decrease drip by 2 units/kg/hr   > 1 Hold Heparin for 1 hour Decrease drip by 3 units/kg/hr     Obtain anti-Xa 6 hours after initial bolus and 6 hours after any dose change until two consecutive therapeutic anti-Xa levels are achieved - then daily.    Whitney Moses, PharmD

## 2024-07-09 NOTE — PROGRESS NOTES
RN called in consult to cardiology for elevated troponins. RN spoke with on call cardiologist MD PLUMMER, no new orders. Will start patient on heparin gtt.

## 2024-07-09 NOTE — PROGRESS NOTES
4 Eyes Skin Assessment     NAME:  Don Tee  YOB: 1955  MEDICAL RECORD NUMBER:  [id number]    The patient is being assessed for  Admission    I agree that at least one RN has performed a thorough Head to Toe Skin Assessment on the patient. ALL assessment sites listed below have been assessed.      Areas assessed by both nurses:    Head, Face, Ears, Shoulders, Back, Chest, Arms, Elbows, Hands, Sacrum. Buttock, Coccyx, Ischium, Legs. Feet and Heels, and Under Medical Devices         Does the Patient have a Wound? Yes wound(s) were present on assessment. LDA wound assessment was Initiated and completed by RN       Toney Prevention initiated by RN: Yes  Wound Care Orders initiated by RN: Yes    Pressure Injury (Stage 3,4, Unstageable, DTI, NWPT, and Complex wounds) if present, place Wound referral order by RN under : Yes    New Ostomies, if present place, Ostomy referral order under : No     Nurse 1 eSignature: Electronically signed by Brenda Whittington RN on 7/9/24 at 12:47 AM EDT    **SHARE this note so that the co-signing nurse can place an eSignature**    Nurse 2 eSignature: Electronically signed by Kaylan Peck RN on 7/9/24 at 12:53 AM EDT

## 2024-07-09 NOTE — PROGRESS NOTES
Clinical Pharmacy Note  Heparin Dosing       Lab Results   Component Value Date/Time    ANTIXAUHEP <0.10 07/09/2024 07:52 AM      Lab Results   Component Value Date/Time    HGB 11.1 07/09/2024 03:32 AM    HCT 32.9 07/09/2024 03:32 AM     07/09/2024 03:32 AM       Current Infusion Rate: 730 units/hr    Plan:  Bolus: 3600 units  Rate: 970 units/hr  Next anti-Xa level: 1500 7/9/24    Pharmacy will continue to monitor and adjust based on anti-Xa results.     Sonia Bourne [facility]

## 2024-07-09 NOTE — PROGRESS NOTES
Pt arrived to ICU via stretcher from ED. Bedside report received from ED RN. Pt attached to ICU Bedside Monitor. Pt alert and oriented x4. Rhythm at this time is NSR. Currently receiving insulin gtt. Bed alarm active and call light within reach. Educated pt on the use of call light and importance of calling RN before attempting to get out of bed. Upon skin inspection and 4 eyes of patient skin, RN removed patient L shoe and sock to find patient L sock and foot moist, and multiple malodorous wounds on L foot crawling with both dead and live maggots. NP Mary Kate Browning and MD Flako Blanc notified. RN cleansed patient wound in wound clenser, CHG solution, and saline to rid of all visible maggots. RN wrapped patient L foot in kerlex rolled gauze. Wound care consult in.     Electronically signed by Brenda Whittington RN on 7/9/2024 at 12:46 AM

## 2024-07-09 NOTE — PROGRESS NOTES
Medication Reconciliation    List of medications patient is currently taking is complete.     Source of information: 1. Conversation with patient at bedside                                      2. EPIC records

## 2024-07-09 NOTE — CONSULTS
[facility]  Palliative Care   Consult Note    NAME:  Don Tee  MEDICAL RECORD NUMBER:  [id number]  AGE: 68 y.o.   GENDER: male  : 1955  TODAY'S DATE:  2024    Subjective     Reason for Consult:  goals of care  Visit Type: Initial Consult      Don Tee is a 68 y.o. male referred by:   [x] Physician    PAST MEDICAL HISTORY      Diagnosis Date    Allergic rhinitis     arthri     Arthritis     lumbar spine    CAD (coronary artery disease)     Chronic kidney disease     retention, bph    Constipation     DM (diabetes mellitus) ([facility])     Generalized pain     GERD (gastroesophageal reflux disease)     constipation    Heart attack ([facility])     Heart disease     Hyperlipidemia     Movement disorder     lumbar spine arthritis    Psychiatric problem     ptsd, anxiety, depression    Urinary retention        PAST SURGICAL HISTORY  Past Surgical History:   Procedure Laterality Date    BACK SURGERY      lamenectomy     CARDIAC SURGERY      stents  &     CORONARY ANGIOPLASTY WITH STENT PLACEMENT      SKIN FULL GRAFT  1980    bilateral legs       FAMILY HISTORY  Family History   Problem Relation Age of Onset    Miscarriages / Stillbirths Mother     Cancer Mother     Heart Disease Father     Diabetes Brother     Diabetes Maternal Uncle     Diabetes Paternal Aunt        SOCIAL HISTORY  Social History     Tobacco Use    Smoking status: Former     Current packs/day: 0.00     Average packs/day: 2.0 packs/day for 41.0 years (82.0 ttl pk-yrs)     Types: E-Cigarettes, Cigarettes     Start date: 2014     Quit date: 2018     Years since quittin.5    Smokeless tobacco: Never    Tobacco comments:     using E-cig with nicotine.  Last cigarette 18.   Vaping Use    Vaping Use: Every day    Substances: Always   Substance Use Topics    Alcohol use: Yes    Drug use: No       ALLERGIES  Allergies   Allergen Reactions    Metoclopramide      Other reaction(s): Orthostatic HTN    No Known

## 2024-07-09 NOTE — PROGRESS NOTES
V2.0  [facility] Hospitalist Progress Note      Name:  Don Tee /Age/Sex: 1955  (68 y.o. male)   MRN & CSN:  [id number] & [id number] Encounter Date/Time: 2024 8:27 AM EDT    Location:  [id number] PCP: Omid Barry, YULIA - CNP       Hospital Day: 2    Assessment and Plan:   Don Tee is a 68 y.o. male with diabetes presenting with DKA      Plan:  Diabetic ketoacidosis 2/2 noncompliance  Type 2 diabetes  -Transition to basal bolus insulin  -Per patient, stopped taking due to being \"done with taking medications\"  -A1C 15.2  Rising troponin, suspected NSTEMI  -Patient asymptomatic  Recheck troponin this a.m. slightly down  -Cardiology consulted, recommended cardiac cath. However, patient refused and does not want anything done, including echo.  -Heparin gtt for 48 hours  -Palliative care consulted  Left foot diabetic wound  -Patient had negative in the left foot   -wound care  -Left foot noticeably   requested wound care team to get wound cultures if possible.  -consider ID consult if patient desires continued aggressive care.  CAD  -continue aspirin, statin daily  -offer cardio follow-up at discharge    Ppx: on Heparin gtt  Dispo: tbd    Subjective:     Chief Complaint: Fatigue (Pt in via EMS from home with generalized weakness that started on way back from store \"around 1600\". Pt states that he uses \"wheelchair at motorized 3 [place]\" to move around. Pt alert and oriented at this time with no signs of distress noted. NIH 0 in triage. EMS reports patient's blood sugar was \"411 en route\". Pt states that he hasn't been taking his insulin/pills for a \"few days\".)     Interval history  Patient reporting pain in his foot. Has been present for his several weeks. Not particularly cooperative with interview.  Brief HPI  68 M PMH T2DM, CAD, CKD 3, anxiety P/W generalized weakness.  Denies fever/chills, SOB, CP.  Reports he stopped taking his medication because he was tired of medicine.  Reportedly had  Decision Support Exception - unselect if not a suspected or confirmed emergency medical condition->Emergency Medical Condition (MA) Reason for Exam: Generalized weakness cannot tell me a specific time. He thinks it was today FINDINGS: BRAIN/VENTRICLES: There is no acute intracranial hemorrhage, mass effect or midline shift. No abnormal extra-axial fluid collection.  The gray-white differentiation is maintained without evidence of an acute infarct. There is prominence of the ventricles and sulci due to global parenchymal volume loss. There are nonspecific areas of hypoattenuation within the periventricular and subcortical white matter, which likely represent chronic microvascular ischemic change. ORBITS: The visualized portion of the orbits demonstrate no acute abnormality.  The right globe is small and deformed. SINUSES: The visualized paranasal sinuses and mastoid air cells demonstrate no acute abnormality. SOFT TISSUES/SKULL: No acute abnormality of the visualized skull or soft tissues.     No acute intracranial abnormality.       Electronically signed by Alton Dailey MD on 7/9/2024 at 8:27 AM

## 2024-07-09 NOTE — PROGRESS NOTES
[facility]  Diabetes Education   Progress Note       NAME:  Don Tee  MEDICAL RECORD NUMBER:  [id number]  AGE: 68 y.o.   GENDER: male  : 1955  TODAY'S DATE:  2024    Subjective   Reason for Diabetic Education Evaluation and Assessment: DKA, hyperglycemia     Don interacts with me with his eyes closed.  He is reluctant to answer self care questions.  He has not been taking his insulin for the last 3 weeks.      He is frustrated that he has not been able to eat yet.      Visit Type: evaluation      Don Tee is a 68 y.o. male referred by:     [x] Physician  [] Nursing  [] Chart Review   [] Other:     PAST MEDICAL HISTORY        Diagnosis Date    Allergic rhinitis     arthri     Arthritis     lumbar spine    CAD (coronary artery disease)     Chronic kidney disease     retention, bph    Constipation     DM (diabetes mellitus) ([facility])     Generalized pain     GERD (gastroesophageal reflux disease)     constipation    Heart attack ([facility])     Heart disease     Hyperlipidemia     Movement disorder     lumbar spine arthritis    Psychiatric problem     ptsd, anxiety, depression    Urinary retention        PAST SURGICAL HISTORY    Past Surgical History:   Procedure Laterality Date    BACK SURGERY      lamenectomy     CARDIAC SURGERY      stents  &     CORONARY ANGIOPLASTY WITH STENT PLACEMENT      SKIN FULL GRAFT  1980    bilateral legs       FAMILY HISTORY    Family History   Problem Relation Age of Onset    Miscarriages / Stillbirths Mother     Cancer Mother     Heart Disease Father     Diabetes Brother     Diabetes Maternal Uncle     Diabetes Paternal Aunt        SOCIAL HISTORY    Social History     Tobacco Use    Smoking status: Former     Current packs/day: 0.00     Average packs/day: 2.0 packs/day for 41.0 years (82.0 ttl pk-yrs)     Types: E-Cigarettes, Cigarettes     Start date: 2014     Quit date: 2018     Years since quittin.5    Smokeless tobacco: Never     type 2, not at goal ([facility]) E11.10    NSTEMI (non-ST elevated myocardial infarction) ([facility]) I21.4        /60   Pulse 79   Temp 97.2 °F (36.2 °C) (Oral)   Resp 16   Ht 1.829 m (6')   Wt 60.6 kg (133 lb 9.6 oz)   SpO2 95%   BMI 18.12 kg/m²     HgBA1c:    Lab Results   Component Value Date/Time    LABA1C 14.0 03/12/2024 03:35 PM       Recent Labs     07/09/24  0528 07/09/24  0629 07/09/24  0751 07/09/24  0920   POCGLU 130* 144* 120* 125*       BUN/Creatinine:    Lab Results   Component Value Date/Time    BUN 13 07/09/2024 06:07 AM    CREATININE <0.5 07/09/2024 06:07 AM       Assessment        Diabetes Management and Education    Does the patient have a Primary Care Physician? Yes, Omid Barry, APRN - CNP       Does the patient require new medication instruction? Yes - suspect missed insulin as described by other providers.  He prefers to use thigh for insulin injections.      Person responsible for administration of Insulin/Medication:        [x] Self     [] Caregiver       [] Spouse       [] Other Family Member   []  Other      Does the patient/caregiver monitor Blood Glucoses? No: Expressed frustration with the Andressa sensor.  Falling off to early is his chief complaint.  Wishes to switch to Dexcom products.  He has a cell phone but prefers to use a reader.      Does the patient/caregiver follow a Meal Plan? Yes - He is able to identify common carbs.  Lunch ordered.  Actively avoiding carbs.      Does the patient/caregiver understand S/S of Hypoglycemia?  Reports some hypoglycemia when taking insulin regularly.  Not able to identify any patterns.        Does the patient/caregiver understand S/S of Hyperglycemia?  Not assessed at this time.        Plan        Ongoing diabetes education and blood glucose monitoring.      Recommend adding small prandial Humalog dose and reducing Lantus to daily.  Notified Alton Dailey MD                        Discharge Plan:  Discharge needs: Dexcom G 7 ([place]

## 2024-07-09 NOTE — CARE COORDINATION
[facility] Nurse  Consult Note       NAME:  Don Tee  MEDICAL RECORD NUMBER:  [id number]  AGE: 68 y.o.   GENDER: male  : 1955  TODAY'S DATE:  2024    Subjective   Reason for WOCN Evaluation and Assessment: multiple pressure injuries, maggot infested wound on L foot      Don Tee is a 68 y.o. male referred by:   [] Physician  [x] Nursing  [] Other:     Wound Identification:  Wound Type: pressure and traumatic  Contributing Factors: chronic pressure and decreased mobility    Wound History: Pt wheelchair bound from home. Pt states he hit his foot on a door while at the mall? Poor historian. Maggots were present on L foot at time of assessment.   Current Wound Care Treatment:  N/A    Patient Goal of Care:  [x] Wound Healing  [] Odor Control  [] Palliative Care  [] Pain Control   [] Other:         PAST MEDICAL HISTORY        Diagnosis Date    Allergic rhinitis     arthri     Arthritis     lumbar spine    CAD (coronary artery disease)     Chronic kidney disease     retention, bph    Constipation     DM (diabetes mellitus) (H[facility])     Generalized pain     GERD (gastroesophageal reflux disease)     constipation    Heart attack (HCC)     Heart disease     Hyperlipidemia     Movement disorder     lumbar spine arthritis    Psychiatric problem     ptsd, anxiety, depression    Urinary retention        PAST SURGICAL HISTORY    Past Surgical History:   Procedure Laterality Date    BACK SURGERY      lamenectomy     CARDIAC SURGERY      stents  &     CORONARY ANGIOPLASTY WITH STENT PLACEMENT      SKIN FULL GRAFT      bilateral legs       FAMILY HISTORY    Family History   Problem Relation Age of Onset    Miscarriages / Stillbirths Mother     Cancer Mother     Heart Disease Father     Diabetes Brother     Diabetes Maternal Uncle     Diabetes Paternal Aunt        SOCIAL HISTORY    Social History     Tobacco Use    Smoking status: Former     Current packs/day: 0.00      ONE TABLET BY MOUTH EVERY DAY 90 tablet 3    Multiple Vitamins-Minerals (CENTRUM SILVER ADULT 50+) TABS Take 1 tablet by mouth daily      vitamin D (VITAMIN D3 ULTRA STRENGTH) 125 MCG (5000 UT) CAPS capsule Take 1 capsule by mouth daily 2 x a week      Melatonin 10 MG CAPS Take 8 capsules by mouth nightly Pt taking 100 mg at bed time      diphenhydrAMINE (BENADRYL) 25 MG tablet Take 2 tablets by mouth every 3-4 hours as needed for Allergies or Sleep         Objective    /67   Pulse 72   Temp (!) 96.3 °F (35.7 °C) (Axillary)   Resp 23   Ht 1.829 m (6')   Wt 60.6 kg (133 lb 9.6 oz)   SpO2 100%   BMI 18.12 kg/m²     LABS:  WBC:    Lab Results   Component Value Date/Time    WBC 9.5 07/09/2024 03:32 AM     H/H:    Lab Results   Component Value Date/Time    HGB 11.1 07/09/2024 03:32 AM    HCT 32.9 07/09/2024 03:32 AM     PTT:    Lab Results   Component Value Date/Time    APTT 29.9 07/09/2024 01:23 AM   [APTT}  PT/INR:  No results found for: \"PROTIME\", \"INR\"  HgBA1c:    Lab Results   Component Value Date/Time    LABA1C 15.2 07/09/2024 03:32 AM       Assessment   Toney Risk Score: Toney Scale Score: 12    Patient Active Problem List   Diagnosis Code    Bipolar affective disorder (HCC) F31.9    Compression fracture of lumbar vertebra ([facility]) S32.000A    Essential hypertension I10    Hyperlipidemia E78.5    Moderate episode of recurrent major depressive disorder (HCC) F33.1    Positive reaction to tuberculin skin test R76.11    Posttraumatic stress 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 (HCC) I24.0, I25.9    Allergic rhinitis due to animal hair and dander J30.81    Benign prostatic hyperplasia with urinary frequency N40.1, R35.0    Diabetes type 2, uncontrolled [id number]    Patient nonadherence Z91.199    Spinal stenosis of lumbar region M48.061    Weakness of both lower extremities R29.898    Immobility Z74.09    Nicotine dependence F17.200

## 2024-07-09 NOTE — PROGRESS NOTES
NAME:  Don Tee  YOB: 1955  MEDICAL RECORD NUMBER:  [id number]    Shift Summary: Patient admitted for DKA, insulin gtt. Heparin gtt for elevated troponins, wound care consult for multiple wounds including maggot infested L foot wound.     Family updated: Yes:  patient states only family is neighbor mellisa, came to bedside and updated.    Rhythm: Normal Sinus Rhythm     Most recent vitals:   Visit Vitals  /64   Pulse 78   Temp 97.2 °F (36.2 °C) (Oral)   Resp 16   Ht 1.829 m (6')   Wt 60.6 kg (133 lb 9.6 oz)   SpO2 (S) 94%   BMI 18.12 kg/m²           No data found.    No data found.      Respiratory support needed (if any):  - RA    Admission weight Weight - Scale: 64.8 kg (142 lb 13.7 oz) (07/08/24 1620)    Today's weight    Wt Readings from Last 1 Encounters:   07/08/24 60.6 kg (133 lb 9.6 oz)        Rouse need assessed each shift: N/A - no rouse present  UOP >30ml/hr: YES  Last documented BM (in last 48 hrs):  No data found.             Restraints (in use currently or dc'd in last 12 hrs): No    Order current and documentation up to date? No    Lines/Drains reviewed @ bedside.  Peripheral IV 07/08/24 Right Antecubital (Active)   Number of days: 0       Peripheral IV 07/09/24 Right;Anterior Forearm (Active)   Number of days: 0       Peripheral IV 07/09/24 Left;Anterior Forearm (Active)   Number of days: 0         Drip rates at handoff:    heparin (PORCINE) Infusion 730 Units/hr (07/09/24 0719)    insulin 1.2 Units/hr (07/09/24 0749)    sodium chloride      dextrose 5% and 0.45% NaCl with KCl 20 mEq 150 mL/hr at 07/09/24 0719    sodium chloride         Lab Data:   CBC:   Recent Labs     07/08/24  1651 07/09/24  0332   WBC 9.7 9.5   HGB 12.3* 11.1*   HCT 37.9* 32.9*   MCV 97.3 93.6    216     BMP:    Recent Labs     07/09/24  0333 07/09/24  0607    138   K 3.3* 3.6   CO2 26 25   BUN 14 13   CREATININE <0.5* <0.5*     LIVR:   Recent Labs     07/08/24  1651   AST 14*   ALT 13      PT/INR: No results for input(s): \"INR\" in the last 72 hours.    Invalid input(s): \"PROT\"  APTT:   Recent Labs     07/09/24  0123   APTT 29.9     ABG: No results for input(s): \"PHART\", \"YKZ4CMW\", \"PO2ART\" in the last 72 hours.    Any consults during the shift? Yes: Consults received: : cardiology, called    Any signed and held orders to be released?  No        4 Eyes Skin Assessment       The patient is being assessed for  Shift Handoff    I agree that at least one RN has performed a thorough Head to Toe Skin Assessment on the patient. ALL assessment sites listed below have been assessed.      Areas assessed by both nurses: Head, Face, Ears, Shoulders, Back, Chest, Arms, Elbows, Hands, Sacrum. Buttock, Coccyx, Ischium, Legs. Feet and Heels, and Under Medical Devices         Does the Patient have a Wound? Yes wound(s) were present on assessment. LDA wound assessment was Initiated and completed by RN    Wound Care Orders initiated by RN: Yes       Toney Prevention initiated by RN: Yes    Pressure Injury (Stage 3,4, Unstageable, DTI, NWPT, and Complex wounds) if present, place Wound referral order by RN under : Yes    New Ostomies, if present place, Ostomy referral order under : No     Nurse 1 eSignature: Electronically signed by Brenda Whittington RN on 7/9/24 at 7:58 AM EDT    **SHARE this note so that the co-signing nurse can place an eSignature**    Nurse 2 eSignature: {Esignature:[id number]}

## 2024-07-09 NOTE — H&P
differentiation is maintained without evidence of an acute infarct. There is prominence of the ventricles and sulci due to global parenchymal volume loss. There are nonspecific areas of hypoattenuation within the periventricular and subcortical white matter, which likely represent chronic microvascular ischemic change. ORBITS: The visualized portion of the orbits demonstrate no acute abnormality.  The right globe is small and deformed. SINUSES: The visualized paranasal sinuses and mastoid air cells demonstrate no acute abnormality. SOFT TISSUES/SKULL: No acute abnormality of the visualized skull or soft tissues.     No acute intracranial abnormality.       PCP: Omid Barry APRN - CNP    Past Medical History:        Diagnosis Date    Allergic rhinitis     arthri     Arthritis     lumbar spine    CAD (coronary artery disease)     Chronic kidney disease     retention, bph    Constipation     DM (diabetes mellitus) (HCC)     Generalized pain     GERD (gastroesophageal reflux disease)     constipation    Heart attack (HCC)     Heart disease     Hyperlipidemia     Movement disorder     lumbar spine arthritis    Psychiatric problem     ptsd, anxiety, depression    Urinary retention        Past Surgical History:        Procedure Laterality Date    BACK SURGERY      lamenectomy 1996    CARDIAC SURGERY      stents 2006 & 2007    CORONARY ANGIOPLASTY WITH STENT PLACEMENT  2007    SKIN FULL GRAFT  1980    bilateral legs       Medications Prior to Admission:   Prior to Admission medications    Medication Sig Start Date End Date Taking? Authorizing Provider   insulin glargine (BASAGLAR KWIKPEN) 100 UNIT/ML injection pen INJECT 20 UNITS SUB-Q TWICE DAILY 3/12/24   Omid Barry APRN - CNP   metFORMIN (GLUCOPHAGE-XR) 500 MG extended release tablet Take 4 tablets by mouth daily (with breakfast) 3/12/24   Omid Barry APRN - CNP   empagliflozin (JARDIANCE) 10 MG tablet Take 1 tablet by mouth daily 3/12/24    Omid Barry APRN - CNP   insulin aspart (NOVOLOG FLEXPEN) 100 UNIT/ML injection pen 1 unit for every 8 grams of carbs, INJECT UP  UNITS SUBCUTANEOUSLY 3 TIMES A DAY 3/12/24   Omid Barry APRN - CNP   buPROPion (WELLBUTRIN SR) 100 MG extended release tablet Take 1 tablet by mouth 2 times daily 3/12/24   Omid Barry APRN - CNP   potassium chloride (KLOR-CON) 10 MEQ extended release tablet Take 1 tablet by mouth daily 3/12/24   Omid Barry APRN - CNP   primidone (MYSOLINE) 50 MG tablet Take 1 tablet by mouth daily 3/12/24   Omid Barry APRN - CNP   propranolol (INDERAL LA) 60 MG extended release capsule Take 1 capsule by mouth daily 3/12/24   Omid Barry APRN - CNP   QUEtiapine (SEROQUEL) 400 MG tablet TAKE ONE TABLET BY MOUTH DAILY IN THE EVENING. 3/12/24   Omid Barry APRN - CNP   simvastatin (ZOCOR) 40 MG tablet Take 1 tablet by mouth nightly 3/12/24   Omid Barry APRN - CNP   tamsulosin (FLOMAX) 0.4 MG capsule Take 1 capsule by mouth daily 3/12/24   Omid Barry APRN - CNP   aspirin (ASPIRIN LOW DOSE) 81 MG EC tablet TAKE ONE TABLET BY MOUTH EVERY DAY 3/12/24   Omid Barry APRN - CNP   Multiple Vitamins-Minerals (CENTRUM SILVER ADULT 50+) TABS Take 1 tablet by mouth daily    Unruly Newman MD   vitamin D (VITAMIN D3 ULTRA STRENGTH) 125 MCG (5000 UT) CAPS capsule Take 1 capsule by mouth daily 2 x a week    Unruly Newman MD   Melatonin 10 MG CAPS Take 8 capsules by mouth nightly Pt taking 100 mg at bed time    Unruly Newman MD   diphenhydrAMINE (BENADRYL) 25 MG tablet Take 2 tablets by mouth every 3-4 hours as needed for Allergies or Sleep    Unruly Newman MD       Labs: Personally reviewed and interpreted for clinical significance.   Recent Labs     07/08/24  1651   WBC 9.7   HGB 12.3*   HCT 37.9*        Recent Labs     07/08/24  1651   *   K 4.3   CL 88*   CO2 15*   BUN 22*   CREATININE 0.8

## 2024-07-09 NOTE — CONSULTS
Cardiology Consultation   Date: 7/9/2024  Admit Date:  7/8/2024  Admission Diagnosis: Dehydration [E86.0]  Metabolic acidosis [E87.20]  Hyperglycemia [R73.9]  Generalized weakness [R53.1]  Elevated troponin [R79.89]  DKA, type 2, not at goal (HCC) [E11.10]  Diabetic ketoacidosis without coma associated with type 2 diabetes mellitus (HCC) [E11.10]     Reason for Consultation: NSTEMI  Consult Requesting Physician: Alton Dailey MD       History of Present Illness  Don Tee is a 68 y.o. year old male with past medical history significant for  DM2, CADs/p remote PCI 2006 at [facility], CKD3, anxiety, HTN  Seen by Cards 2014- has not followed up since.     Presented to [facility] ED with severe generalized weakness that started several hours prior.   Denies syncope, CP or SOB.  Blood sugar elevated 511-treating for DKA with insulin drip- weaning off.  HS troponin elevated-denies any chest pain or shortness of breath.  EKG with lateral ST segment changes  Started on IV heparin drip    Today pt laying in bed flat. Continues to deny chest pain or shortness of breath  Explained to patient that he had elevated cardiac enzymes and EKG changes consistent with a heart attack  Patient tells me he does not believe it.  I offered a cardiac catheterization and explained to patient in detail what that is.  He immediately tells me he does not want it done.  I tried to explain further my concern with his history of coronary artery disease, poor follow-up and likely worsening CAD and he is adamant he does not want any procedures such as an left heart cath or an echocardiogram be done.      Past Medical History:   Diagnosis Date    Allergic rhinitis     arthri     Arthritis     lumbar spine    CAD (coronary artery disease)     Chronic kidney disease     retention, bph    Constipation     DM (diabetes mellitus) (HCC)     Generalized pain     GERD (gastroesophageal reflux disease)     constipation    Heart attack (HCC)     Heart disease      (PORCINE) Infusion 970 Units/hr (24)    dextrose      insulin 0.65 Units/hr (24)    sodium chloride      sodium chloride       PRN Meds:.glucose, dextrose bolus **OR** dextrose bolus, glucagon (rDNA), dextrose, potassium chloride, magnesium sulfate, polyethylene glycol, sodium chloride flush, sodium chloride, LORazepam **OR** LORazepam **OR** LORazepam **OR** LORazepam **OR** LORazepam **OR** LORazepam **OR** LORazepam **OR** LORazepam     Physical Examination:  /60   Pulse 79   Temp 97.2 °F (36.2 °C) (Oral)   Resp 16   Ht 1.829 m (6')   Wt 60.6 kg (133 lb 9.6 oz)   SpO2 95%   BMI 18.12 kg/m²         Intake/Output Summary (Last 24 hours) at 2024 1015  Last data filed at 2024 0719  Gross per 24 hour   Intake 1922.13 ml   Output 600 ml   Net 1322.13 ml     I/O since adm:     WEIGHT:Admit Weight - Scale: 64.8 kg (142 lb 13.7 oz)         Today  Weight - Scale: 60.6 kg (133 lb 9.6 oz)   DRY WEIGHT:  Wt Readings from Last 3 Encounters:   24 60.6 kg (133 lb 9.6 oz)   23 71.9 kg (158 lb 9.6 oz)   23 72.1 kg (159 lb)         Temp  Av.4 °F (36.3 °C)  Min: 97.2 °F (36.2 °C)  Max: 97.8 °F (36.6 °C)  Pulse  Av  Min: 73  Max: 100  BP  Min: 102/54  Max: 149/77  SpO2  Av.6 %  Min: 91 %  Max: 98 %    Physical Exam:  GEN: Appears frail no acute distress  SKIN: Pink, warm, dry. Nails without clubbing.  HEENT: PERRLA. Normocephalic, atraumatic. Neck supple. No adenopathy.  LUNG: AP diameter normal. Clear bilateral. No wheeze, rales, or ronchi. Respiratory effort normal.  HEART: S1S2 A/R. No JVD. No carotid bruit. No murmur, rub or gallop.  ABD: Soft, nontender. +BS X 4 quads. No hepatomegaly.   EXT: Radial and pedal pulses 2+ and symmetric. Without varicosities. No edema. RLE dsg CDI.   MUSCSKEL: Good ROM X4 extremities. No deformity.   NEURO: A/O X3. Calm and cooperative.       Labs: I have reviewed all labs below today.   CBC   Recent Labs     24  1651

## 2024-07-09 NOTE — PROGRESS NOTES
During admission questioning and initial assessment of patient, patient tells RN that he drinks a shot if whiskey every night to help sleep. Patient cannot remember when this started. RN messaged ERIN Jones ordered. Additionally, patient demands that this RN takes off finger pulse oximetry stating that it is uncomfortable and gets in the way. Patient on room air, O2 100%. MD messaged, OK to spot check patient O2.

## 2024-07-09 NOTE — PROGRESS NOTES
Clinical Pharmacy Note  Heparin Dosing       Lab Results   Component Value Date/Time    ANTIXAUHEP 0.32 07/09/2024 03:00 PM      Lab Results   Component Value Date/Time    HGB 11.1 07/09/2024 03:32 AM    HCT 32.9 07/09/2024 03:32 AM     07/09/2024 03:32 AM       Current Infusion Rate: 970 units/hr    Plan:  Rate: continue 970 units/hr  Next anti-Xa level: 2100 7/9/24    Pharmacy will continue to monitor and adjust based on anti-Xa results.     Laisha Veras RPH

## 2024-07-10 ENCOUNTER — APPOINTMENT (OUTPATIENT)
Age: 69
End: 2024-07-10
Attending: FAMILY MEDICINE
Payer: MEDICARE

## 2024-07-10 LAB
ALBUMIN SERPL-MCNC: 2.8 G/DL (ref 3.4–5)
ANION GAP SERPL CALCULATED.3IONS-SCNC: 8 MMOL/L (ref 3–16)
ANTI-XA UNFRAC HEPARIN: 0.25 IU/ML (ref 0.3–0.7)
ANTI-XA UNFRAC HEPARIN: <0.1 IU/ML (ref 0.3–0.7)
BASOPHILS # BLD: 0.1 K/UL (ref 0–0.2)
BASOPHILS NFR BLD: 0.7 %
BUN SERPL-MCNC: 12 MG/DL (ref 7–20)
CALCIUM SERPL-MCNC: 8.7 MG/DL (ref 8.3–10.6)
CHLORIDE SERPL-SCNC: 100 MMOL/L (ref 99–110)
CO2 SERPL-SCNC: 25 MMOL/L (ref 21–32)
CREAT SERPL-MCNC: <0.5 MG/DL (ref 0.8–1.3)
DEPRECATED RDW RBC AUTO: 14.3 % (ref 12.4–15.4)
ECHO AO ASC DIAM: 3.2 CM
ECHO AO ASCENDING AORTA INDEX: 1.68 CM/M2
ECHO AO ROOT DIAM: 3.8 CM
ECHO AO ROOT INDEX: 2 CM/M2
ECHO AV AREA PEAK VELOCITY: 3.1 CM2
ECHO AV AREA VTI: 3 CM2
ECHO AV AREA/BSA PEAK VELOCITY: 1.6 CM2/M2
ECHO AV AREA/BSA VTI: 1.6 CM2/M2
ECHO AV MEAN GRADIENT: 2 MMHG
ECHO AV MEAN VELOCITY: 0.6 M/S
ECHO AV PEAK GRADIENT: 2 MMHG
ECHO AV PEAK VELOCITY: 0.8 M/S
ECHO AV VELOCITY RATIO: 0.75
ECHO AV VTI: 19 CM
ECHO BSA: 1.87 M2
ECHO EST RA PRESSURE: 3 MMHG
ECHO LA AREA 4C: 17 CM2
ECHO LA DIAMETER INDEX: 2.16 CM/M2
ECHO LA DIAMETER: 4.1 CM
ECHO LA MAJOR AXIS: 4.6 CM
ECHO LA TO AORTIC ROOT RATIO: 1.08
ECHO LA VOL MOD A4C: 47 ML (ref 18–58)
ECHO LA VOLUME INDEX MOD A4C: 25 ML/M2 (ref 16–34)
ECHO LV E' LATERAL VELOCITY: 6 CM/S
ECHO LV E' SEPTAL VELOCITY: 7 CM/S
ECHO LV EDV A2C: 57 ML
ECHO LV EDV A4C: 84 ML
ECHO LV EDV INDEX A4C: 44 ML/M2
ECHO LV EDV NDEX A2C: 30 ML/M2
ECHO LV EJECTION FRACTION A2C: 14 %
ECHO LV EJECTION FRACTION A4C: 32 %
ECHO LV EJECTION FRACTION BIPLANE: 24 % (ref 55–100)
ECHO LV ESV A2C: 49 ML
ECHO LV ESV A4C: 57 ML
ECHO LV ESV INDEX A2C: 26 ML/M2
ECHO LV ESV INDEX A4C: 30 ML/M2
ECHO LV FRACTIONAL SHORTENING: 23 % (ref 28–44)
ECHO LV INTERNAL DIMENSION DIASTOLE INDEX: 2.74 CM/M2
ECHO LV INTERNAL DIMENSION DIASTOLIC: 5.2 CM (ref 4.2–5.9)
ECHO LV INTERNAL DIMENSION SYSTOLIC INDEX: 2.11 CM/M2
ECHO LV INTERNAL DIMENSION SYSTOLIC: 4 CM
ECHO LV IVSD: 1.4 CM (ref 0.6–1)
ECHO LV MASS 2D: 309.6 G (ref 88–224)
ECHO LV MASS INDEX 2D: 162.9 G/M2 (ref 49–115)
ECHO LV POSTERIOR WALL DIASTOLIC: 1.4 CM (ref 0.6–1)
ECHO LV RELATIVE WALL THICKNESS RATIO: 0.54
ECHO LVOT AREA: 4.2 CM2
ECHO LVOT AV VTI INDEX: 0.73
ECHO LVOT DIAM: 2.3 CM
ECHO LVOT MEAN GRADIENT: 1 MMHG
ECHO LVOT PEAK GRADIENT: 1 MMHG
ECHO LVOT PEAK VELOCITY: 0.6 M/S
ECHO LVOT STROKE VOLUME INDEX: 30.2 ML/M2
ECHO LVOT SV: 57.3 ML
ECHO LVOT VTI: 13.8 CM
ECHO MV A VELOCITY: 0.83 M/S
ECHO MV E VELOCITY: 0.56 M/S
ECHO MV E/A RATIO: 0.67
ECHO MV E/E' LATERAL: 9.33
ECHO MV E/E' RATIO (AVERAGED): 8.67
ECHO MV E/E' SEPTAL: 8
ECHO RA AREA 4C: 10.5 CM2
ECHO RA END SYSTOLIC VOLUME APICAL 4 CHAMBER INDEX BSA: 10 ML/M2
ECHO RA VOLUME: 19 ML
ECHO RIGHT VENTRICULAR SYSTOLIC PRESSURE (RVSP): 20 MMHG
ECHO RV BASAL DIMENSION: 3.7 CM
ECHO RV FREE WALL PEAK S': 12 CM/S
ECHO RV LONGITUDINAL DIMENSION: 7.2 CM
ECHO RV MID DIMENSION: 2.9 CM
ECHO RV TAPSE: 2.4 CM (ref 1.7–?)
ECHO TV REGURGITANT MAX VELOCITY: 2.09 M/S
ECHO TV REGURGITANT PEAK GRADIENT: 17 MMHG
EOSINOPHIL # BLD: 0.2 K/UL (ref 0–0.6)
EOSINOPHIL NFR BLD: 1.9 %
GFR SERPLBLD CREATININE-BSD FMLA CKD-EPI: >90 ML/MIN/{1.73_M2}
GLUCOSE BLD-MCNC: 159 MG/DL (ref 70–99)
GLUCOSE BLD-MCNC: 331 MG/DL (ref 70–99)
GLUCOSE BLD-MCNC: 345 MG/DL (ref 70–99)
GLUCOSE BLD-MCNC: 98 MG/DL (ref 70–99)
GLUCOSE SERPL-MCNC: 299 MG/DL (ref 70–99)
HCT VFR BLD AUTO: 33.1 % (ref 40.5–52.5)
HGB BLD-MCNC: 11.4 G/DL (ref 13.5–17.5)
LYMPHOCYTES # BLD: 1.9 K/UL (ref 1–5.1)
LYMPHOCYTES NFR BLD: 22.6 %
MAGNESIUM SERPL-MCNC: 1.7 MG/DL (ref 1.8–2.4)
MCH RBC QN AUTO: 32.4 PG (ref 26–34)
MCHC RBC AUTO-ENTMCNC: 34.3 G/DL (ref 31–36)
MCV RBC AUTO: 94.4 FL (ref 80–100)
MONOCYTES # BLD: 0.6 K/UL (ref 0–1.3)
MONOCYTES NFR BLD: 7.4 %
NEUTROPHILS # BLD: 5.6 K/UL (ref 1.7–7.7)
NEUTROPHILS NFR BLD: 67.4 %
PERFORMED ON: ABNORMAL
PERFORMED ON: NORMAL
PHOSPHATE SERPL-MCNC: 3.1 MG/DL (ref 2.5–4.9)
PLATELET # BLD AUTO: 179 K/UL (ref 135–450)
PMV BLD AUTO: 8.5 FL (ref 5–10.5)
POTASSIUM SERPL-SCNC: 3.8 MMOL/L (ref 3.5–5.1)
RBC # BLD AUTO: 3.51 M/UL (ref 4.2–5.9)
REASON FOR REJECTION: NORMAL
REJECTED TEST: NORMAL
SODIUM SERPL-SCNC: 133 MMOL/L (ref 136–145)
VANCOMYCIN SERPL-MCNC: 14.4 UG/ML
WBC # BLD AUTO: 8.3 K/UL (ref 4–11)

## 2024-07-10 PROCEDURE — 6360000002 HC RX W HCPCS: Performed by: STUDENT IN AN ORGANIZED HEALTH CARE EDUCATION/TRAINING PROGRAM

## 2024-07-10 PROCEDURE — 97535 SELF CARE MNGMENT TRAINING: CPT

## 2024-07-10 PROCEDURE — 83735 ASSAY OF MAGNESIUM: CPT

## 2024-07-10 PROCEDURE — 6370000000 HC RX 637 (ALT 250 FOR IP): Performed by: FAMILY MEDICINE

## 2024-07-10 PROCEDURE — 36415 COLL VENOUS BLD VENIPUNCTURE: CPT

## 2024-07-10 PROCEDURE — 85520 HEPARIN ASSAY: CPT

## 2024-07-10 PROCEDURE — 80202 ASSAY OF VANCOMYCIN: CPT

## 2024-07-10 PROCEDURE — 93306 TTE W/DOPPLER COMPLETE: CPT

## 2024-07-10 PROCEDURE — 2580000003 HC RX 258: Performed by: STUDENT IN AN ORGANIZED HEALTH CARE EDUCATION/TRAINING PROGRAM

## 2024-07-10 PROCEDURE — 97530 THERAPEUTIC ACTIVITIES: CPT

## 2024-07-10 PROCEDURE — 97162 PT EVAL MOD COMPLEX 30 MIN: CPT

## 2024-07-10 PROCEDURE — 97166 OT EVAL MOD COMPLEX 45 MIN: CPT

## 2024-07-10 PROCEDURE — 93306 TTE W/DOPPLER COMPLETE: CPT | Performed by: INTERNAL MEDICINE

## 2024-07-10 PROCEDURE — 6370000000 HC RX 637 (ALT 250 FOR IP): Performed by: STUDENT IN AN ORGANIZED HEALTH CARE EDUCATION/TRAINING PROGRAM

## 2024-07-10 PROCEDURE — 2580000003 HC RX 258: Performed by: FAMILY MEDICINE

## 2024-07-10 PROCEDURE — 85025 COMPLETE CBC W/AUTO DIFF WBC: CPT

## 2024-07-10 PROCEDURE — 1200000000 HC SEMI PRIVATE

## 2024-07-10 PROCEDURE — 80069 RENAL FUNCTION PANEL: CPT

## 2024-07-10 RX ORDER — HEPARIN SODIUM 1000 [USP'U]/ML
3600 INJECTION, SOLUTION INTRAVENOUS; SUBCUTANEOUS ONCE
Status: COMPLETED | OUTPATIENT
Start: 2024-07-10 | End: 2024-07-10

## 2024-07-10 RX ORDER — INSULIN GLARGINE 100 [IU]/ML
15 INJECTION, SOLUTION SUBCUTANEOUS 2 TIMES DAILY
Status: DISCONTINUED | OUTPATIENT
Start: 2024-07-10 | End: 2024-07-11

## 2024-07-10 RX ORDER — HEPARIN SODIUM 1000 [USP'U]/ML
1800 INJECTION, SOLUTION INTRAVENOUS; SUBCUTANEOUS ONCE
Status: COMPLETED | OUTPATIENT
Start: 2024-07-10 | End: 2024-07-10

## 2024-07-10 RX ORDER — INSULIN LISPRO 100 [IU]/ML
8 INJECTION, SOLUTION INTRAVENOUS; SUBCUTANEOUS
Status: DISCONTINUED | OUTPATIENT
Start: 2024-07-10 | End: 2024-07-11

## 2024-07-10 RX ORDER — INSULIN LISPRO 100 [IU]/ML
5 INJECTION, SOLUTION INTRAVENOUS; SUBCUTANEOUS
Status: DISCONTINUED | OUTPATIENT
Start: 2024-07-10 | End: 2024-07-10

## 2024-07-10 RX ADMIN — VANCOMYCIN HYDROCHLORIDE 1000 MG: 1 INJECTION, POWDER, LYOPHILIZED, FOR SOLUTION INTRAVENOUS at 17:55

## 2024-07-10 RX ADMIN — Medication: at 09:12

## 2024-07-10 RX ADMIN — SODIUM CHLORIDE, PRESERVATIVE FREE 10 ML: 5 INJECTION INTRAVENOUS at 09:10

## 2024-07-10 RX ADMIN — INSULIN GLARGINE 20 UNITS: 100 INJECTION, SOLUTION SUBCUTANEOUS at 09:10

## 2024-07-10 RX ADMIN — VANCOMYCIN HYDROCHLORIDE 1000 MG: 1 INJECTION, POWDER, LYOPHILIZED, FOR SOLUTION INTRAVENOUS at 00:33

## 2024-07-10 RX ADMIN — SODIUM CHLORIDE, PRESERVATIVE FREE 10 ML: 5 INJECTION INTRAVENOUS at 21:02

## 2024-07-10 RX ADMIN — CEFEPIME 2000 MG: 2 INJECTION, POWDER, FOR SOLUTION INTRAVENOUS at 15:08

## 2024-07-10 RX ADMIN — INSULIN LISPRO 5 UNITS: 100 INJECTION, SOLUTION INTRAVENOUS; SUBCUTANEOUS at 09:11

## 2024-07-10 RX ADMIN — QUETIAPINE FUMARATE 400 MG: 200 TABLET ORAL at 20:56

## 2024-07-10 RX ADMIN — INSULIN LISPRO 3 UNITS: 100 INJECTION, SOLUTION INTRAVENOUS; SUBCUTANEOUS at 09:11

## 2024-07-10 RX ADMIN — INSULIN LISPRO 5 UNITS: 100 INJECTION, SOLUTION INTRAVENOUS; SUBCUTANEOUS at 12:19

## 2024-07-10 RX ADMIN — TAMSULOSIN HYDROCHLORIDE 0.4 MG: 0.4 CAPSULE ORAL at 20:56

## 2024-07-10 RX ADMIN — INSULIN LISPRO 3 UNITS: 100 INJECTION, SOLUTION INTRAVENOUS; SUBCUTANEOUS at 12:20

## 2024-07-10 RX ADMIN — EMPAGLIFLOZIN 10 MG: 10 TABLET, FILM COATED ORAL at 15:45

## 2024-07-10 RX ADMIN — BUPROPION HYDROCHLORIDE 100 MG: 100 TABLET, FILM COATED, EXTENDED RELEASE ORAL at 09:09

## 2024-07-10 RX ADMIN — HEPARIN SODIUM 1800 UNITS: 1000 INJECTION INTRAVENOUS; SUBCUTANEOUS at 06:16

## 2024-07-10 RX ADMIN — VANCOMYCIN HYDROCHLORIDE 1000 MG: 1 INJECTION, POWDER, LYOPHILIZED, FOR SOLUTION INTRAVENOUS at 09:08

## 2024-07-10 RX ADMIN — CEFEPIME 2000 MG: 2 INJECTION, POWDER, FOR SOLUTION INTRAVENOUS at 03:34

## 2024-07-10 RX ADMIN — ATORVASTATIN CALCIUM 20 MG: 20 TABLET, FILM COATED ORAL at 20:56

## 2024-07-10 RX ADMIN — ASPIRIN 81 MG: 81 TABLET, COATED ORAL at 09:09

## 2024-07-10 RX ADMIN — INSULIN GLARGINE 15 UNITS: 100 INJECTION, SOLUTION SUBCUTANEOUS at 21:00

## 2024-07-10 RX ADMIN — INSULIN LISPRO 8 UNITS: 100 INJECTION, SOLUTION INTRAVENOUS; SUBCUTANEOUS at 17:49

## 2024-07-10 RX ADMIN — HEPARIN SODIUM 1090 UNITS/HR: 10000 INJECTION, SOLUTION INTRAVENOUS at 02:07

## 2024-07-10 RX ADMIN — PROPRANOLOL HYDROCHLORIDE 60 MG: 60 CAPSULE, EXTENDED RELEASE ORAL at 14:50

## 2024-07-10 RX ADMIN — Medication 5000 UNITS: at 09:09

## 2024-07-10 RX ADMIN — HEPARIN SODIUM 1210 UNITS/HR: 10000 INJECTION, SOLUTION INTRAVENOUS at 06:18

## 2024-07-10 RX ADMIN — HEPARIN SODIUM 3600 UNITS: 1000 INJECTION INTRAVENOUS; SUBCUTANEOUS at 14:59

## 2024-07-10 RX ADMIN — BUPROPION HYDROCHLORIDE 100 MG: 100 TABLET, FILM COATED, EXTENDED RELEASE ORAL at 20:56

## 2024-07-10 RX ADMIN — Medication 100 MG: at 09:09

## 2024-07-10 RX ADMIN — PRIMIDONE 50 MG: 50 TABLET ORAL at 09:09

## 2024-07-10 RX ADMIN — Medication 1 TABLET: at 09:12

## 2024-07-10 RX ADMIN — Medication 19.5 MG: at 20:56

## 2024-07-10 ASSESSMENT — PAIN SCALES - GENERAL
PAINLEVEL_OUTOF10: 0
PAINLEVEL_OUTOF10: 0

## 2024-07-10 NOTE — PROGRESS NOTES
[facility]  Palliative Care   Progress Note    NAME:  Don Tee  MEDICAL RECORD NUMBER:  [id number]  AGE: 68 y.o.   GENDER: male  : 1955  TODAY'S DATE:  7/10/2024    Subjective: Patient awake alert no complaints at present.    Objective:    Vitals:    07/10/24 0954   BP: 118/71   Pulse:    Resp:    Temp:    SpO2:      Lab Results   Component Value Date    WBC 8.3 07/10/2024    HGB 11.4 (L) 07/10/2024    HCT 33.1 (L) 07/10/2024    MCV 94.4 07/10/2024     07/10/2024     Lab Results   Component Value Date    CREATININE <0.5 (L) 07/10/2024    BUN 12 07/10/2024     (L) 07/10/2024    K 3.8 07/10/2024     07/10/2024    CO2 25 07/10/2024     Lab Results   Component Value Date    ALT 13 2024    AST 14 (L) 2024    ALKPHOS 109 2024    BILITOT 0.3 2024       Plan: Patient agreeable to have goals of care conversation, he did request  I get his neighbor Lyndsey Jensen [phone] on speaker phone. We have discussed code status and agreed he did not want chest compressions/shocking, resuscitative meds, intubation under any circumstances, he wants to die a peaceful death. He is agreeable to completed HPOA/living will paperwork he would like to name Lyndsey Jensen as his agent. He plans on going home, understands he needs to follow medical advice take meds, have home care and wound care. Lyndsey understands above information has requested detailed written instructions so she can help him follow his care.       Code Status: DNR-CC  Discharge Environment:  [] Hospice Consult Agency:  [] Inpatient Hospice    [] Home with Hospice Care   [] ECF with Hospice  [] ECF skilled care with Hospice to follow   [] Other:    Teaching Time:  1hour     I will continue to follow Mr. Tee's care as needed.      Thank you for allowing me to participate in the care of Mr. Tee .     Electronically signed by Riana Dodd, RN, BSN,CHPN on 7/10/2024 at 2:06 PM  Palliative

## 2024-07-10 NOTE — ACP (ADVANCE CARE PLANNING)
Advance Care Planning     Palliative Team Advance Care Planning (ACP) Conversation    Date of Conversation: 07/10/24    Individuals present for the conversation: Patient with decision making capacity     ACP documents on file prior to discussion:  -None    Previously completed document/s not on file: none     Healthcare Decision Maker:    Primary Decision Maker: Lyndsey Jensen - Neighbor - [phone]     Conversation Summary: During discussion with patient and Lyndsey on speaker phone he agreed he did not want chest compressions/shocking, intubation or resuscitative meds for cardiac arrest or resp arrest/distress.     Resuscitation Status:   Code Status: DNR-CC     Documentation Completed:  -Power of  for Healthcare, -Living Will, and -Portable DNR    I spent 30 minutes with the patient and/or surrogate decision maker discussing the patient's wishes and goals.      Riana Dodd RN

## 2024-07-10 NOTE — PROGRESS NOTES
Pt hypotensive, two most recent BP's: 80/40 (52), 78/42 (53). Secure message to NP. Orders for NS 500mL bolus over 2 hours & 5mg midodrine PO x1 dose. (See MAR).     Electronically signed by Nellie Perrin RN on 7/9/2024 at 11:25 PM

## 2024-07-10 NOTE — PROGRESS NOTES
Clinical Pharmacy Note  Heparin Dosing       Lab Results   Component Value Date/Time    ANTIXAUHEP 0.13 07/09/2024 09:52 PM      Lab Results   Component Value Date/Time    HGB 11.1 07/09/2024 03:32 AM    HCT 32.9 07/09/2024 03:32 AM     07/09/2024 03:32 AM       Current Infusion Rate: 970 units/hr    Plan:  Bolus: 1800 units  Rate: 1090 units/hr  Next anti-Xa level: 0500 07/10/24    Pharmacy will continue to monitor and adjust based on anti-Xa results.    Whitney Moses, PharmD

## 2024-07-10 NOTE — PROGRESS NOTES
[facility]  Diabetes Education   Progress Note       NAME:  Don Tee  MEDICAL RECORD NUMBER:  [id number]  AGE: 68 y.o.   GENDER: male  : 1955  TODAY'S DATE:  7/10/2024    Subjective   Reason for Diabetic Education Evaluation and Assessment: hyperglycemia    Don is interested in the Dexcom G 7 for home.  He is anticipating improved insurance coverage.      Visit Type: follow-up      Don Tee is a 68 y.o. male referred by:     [x] Physician  [] Nursing  [] Chart Review   [] Other:     PAST MEDICAL HISTORY        Diagnosis Date    Allergic rhinitis     arthri     Arthritis     lumbar spine    CAD (coronary artery disease)     Chronic kidney disease     retention, bph    Constipation     DM (diabetes mellitus) ([facility])     Generalized pain     GERD (gastroesophageal reflux disease)     constipation    Heart attack (HCC)     Heart disease     Hyperlipidemia     Movement disorder     lumbar spine arthritis    Psychiatric problem     ptsd, anxiety, depression    Urinary retention        PAST SURGICAL HISTORY    Past Surgical History:   Procedure Laterality Date    BACK SURGERY      lamenectomy     CARDIAC SURGERY      stents  &     CORONARY ANGIOPLASTY WITH STENT PLACEMENT      SKIN FULL GRAFT      bilateral legs       FAMILY HISTORY    Family History   Problem Relation Age of Onset    Miscarriages / Stillbirths Mother     Cancer Mother     Heart Disease Father     Diabetes Brother     Diabetes Maternal Uncle     Diabetes Paternal Aunt        SOCIAL HISTORY    Social History     Tobacco Use    Smoking status: Former     Current packs/day: 0.00     Average packs/day: 2.0 packs/day for 41.0 years (82.0 ttl pk-yrs)     Types: E-Cigarettes, Cigarettes     Start date: 2014     Quit date: 2018     Years since quittin.5    Smokeless tobacco: Never    Tobacco comments:     using E-cig with nicotine.  Last cigarette 18.   Vaping Use    Vaping Use: Every day

## 2024-07-10 NOTE — PROGRESS NOTES
cooking but needs assist for laundry and cleaning; gets groceries delivered from [employer])  Ambulation Assistance: Non-ambulatory (uses the scooter when going out; uses the w/c in the house)  Transfer Assistance: Independent  Active : No  Occupation: On disability, Retired  IADL Comments: sleeps in a regular flat bed  Vision/Hearing  Vision  Vision: Impaired  Vision Exceptions: Wears glasses at all times (blind in the R eye due to glaucoma)  Hearing  Hearing: Within functional limits    Cognition   Orientation  Overall Orientation Status: Within Functional Limits  Cognition  Overall Cognitive Status: Exceptions  Following Commands: Follows one step commands with increased time  Safety Judgement: Decreased awareness of need for assistance;Decreased awareness of need for safety  Problem Solving: Decreased awareness of errors  Insights: Decreased awareness of deficits  Initiation: Requires cues for some  Sequencing: Requires cues for some  Cognition Comment: the pt becomes easily agitated     Objective        Observation/Palpation  Observation: dressings on L foot (the pt reports the wound is on the little toe side of the foot)    Gross Assessment  AROM: Generally decreased, functional  Strength: Generally decreased, functional (hip flexion 3+/5, knee extension 4-/5, ankle DF 4-/5)  Sensation: Impaired (reports neuropathy in B feet up to the ankles)       Bed mobility  Supine to Sit: Moderate assistance (HOB elevated; needed asisst to get legs off the EOB and the pt pulled up on therapist)  Sit to Supine: Unable to assess (the pt ended session in the chair)    Transfers  Sit to Stand: Minimal Assistance;Moderate Assistance;2 Person Assistance (from chair > stedy)  Stand to Sit: Minimal Assistance  Stand Pivot Transfers: Minimal Assistance;Moderate Assistance (bed > chair going to the R with min A; chair > bed going to the L with mod A)    Ambulation  Comments: the pt is non-ambulatory at baseline, uses manual w/c

## 2024-07-10 NOTE — PROGRESS NOTES
NAME:  Don Tee  YOB: 1955  MEDICAL RECORD NUMBER:  [id number]    Shift Summary: pt transferred to room 4122. Slightly hypotensive @ 2200 - 500mL bolus and 1x dose midodrine. Pressures improved. Pt agreeable to heart catheterization. 4 eyes performed w/ oncoming nurse Koki LOWERY. Crystal - pts neighbor aware of room change.     Family updated: Yes:  Crystal    Rhythm: Normal Sinus Rhythm     Most recent vitals:   Visit Vitals  BP (!) 106/50   Pulse 73   Temp 98 °F (36.7 °C) (Oral)   Resp 16   Ht 1.829 m (6')   Wt 60.6 kg (133 lb 9.6 oz)   SpO2 93%   BMI 18.12 kg/m²           No data found.    No data found.      Respiratory support needed (if any):  - RA    Admission weight Weight - Scale: 64.8 kg (142 lb 13.7 oz) (07/08/24 1620)    Today's weight    Wt Readings from Last 1 Encounters:   07/08/24 60.6 kg (133 lb 9.6 oz)        Rouse need assessed each shift: N/A - no rouse present  UOP >30ml/hr: YES  Last documented BM (in last 48 hrs):  No data found.             Restraints (in use currently or dc'd in last 12 hrs): No    Order current and documentation up to date? No    Lines/Drains reviewed @ bedside.  Peripheral IV 07/08/24 Right Antecubital (Active)   Number of days: 1       Peripheral IV 07/09/24 Right;Anterior Forearm (Active)   Number of days: 1       Peripheral IV 07/09/24 Left;Anterior Forearm (Active)   Number of days: 0         Drip rates at handoff:    heparin (PORCINE) Infusion 1,090 Units/hr (07/10/24 0207)    dextrose      sodium chloride         Lab Data:   CBC:   Recent Labs     07/08/24  1651 07/09/24  0332   WBC 9.7 9.5   HGB 12.3* 11.1*   HCT 37.9* 32.9*   MCV 97.3 93.6    216     BMP:    Recent Labs     07/09/24  0333 07/09/24  0607    138   K 3.3* 3.6   CO2 26 25   BUN 14 13   CREATININE <0.5* <0.5*     LIVR:   Recent Labs     07/08/24  1651   AST 14*   ALT 13     PT/INR: No results for input(s): \"INR\" in the last 72 hours.    Invalid input(s):

## 2024-07-10 NOTE — PLAN OF CARE
Problem: Discharge Planning  Goal: Discharge to home or other facility with appropriate resources  Outcome: Progressing     Problem: Skin/Tissue Integrity  Goal: Absence of new skin breakdown  Description: 1.  Monitor for areas of redness and/or skin breakdown  2.  Assess vascular access sites hourly  3.  Every 4-6 hours minimum:  Change oxygen saturation probe site  4.  Every 4-6 hours:  If on nasal continuous positive airway pressure, respiratory therapy assess nares and determine need for appliance change or resting period.  7/9/2024 2240 by Nellie Perrin RN  Outcome: Progressing  7/9/2024 1323 by Adriane Ochoa RN  Outcome: Progressing  7/9/2024 1319 by Adriane Ochoa RN  Outcome: Progressing     Problem: Pain  Goal: Verbalizes/displays adequate comfort level or baseline comfort level  7/9/2024 2240 by Nellie Perrin RN  Outcome: Progressing  Flowsheets (Taken 7/9/2024 2000)  Verbalizes/displays adequate comfort level or baseline comfort level:   Assess pain using appropriate pain scale   Encourage patient to monitor pain and request assistance   Administer analgesics based on type and severity of pain and evaluate response   Implement non-pharmacological measures as appropriate and evaluate response   Consider cultural and social influences on pain and pain management   Notify Licensed Independent Practitioner if interventions unsuccessful or patient reports new pain  7/9/2024 1323 by Adriane Ochoa RN  Outcome: Progressing  Flowsheets (Taken 7/9/2024 1323)  Verbalizes/displays adequate comfort level or baseline comfort level:   Encourage patient to monitor pain and request assistance   Consider cultural and social influences on pain and pain management   Assess pain using appropriate pain scale   Implement non-pharmacological measures as appropriate and evaluate response   Notify Licensed Independent Practitioner if interventions unsuccessful or patient reports new pain  7/9/2024 1319 by Adriane Ochoa,  patient/family in relaxation techniques, as appropriate  5. Assess for spiritual pain/suffering and initiate Spiritual Care, Psychosocial Clinical Specialist consults as needed  Outcome: Progressing  Flowsheets (Taken 7/9/2024 2000)  Patient/family able to verbalize anxieties, fears, and concerns, and demonstrate effective coping:   Assist patient/family to identify coping skills, available support systems and cultural and spiritual values   Provide emotional support, including active listening and acknowledgement of concerns of patient and caregivers   Reduce environmental stimuli, as able   Instruct patient/family in relaxation techniques, as appropriate   Assess for spiritual pain/suffering and initiate Spiritual Care, Psychosocial Clinical Specialist consults as needed     Problem: Chronic Conditions and Co-morbidities  Goal: Patient's chronic conditions and co-morbidity symptoms are monitored and maintained or improved  Outcome: Progressing     Problem: ABCDS Injury Assessment  Goal: Absence of physical injury  Outcome: Progressing

## 2024-07-10 NOTE — CARE COORDINATION
7/10 Therapy notes reviewed with recommendation for SNF. Met with patient at bedside to discuss discharge plan. Patient refuses SNF. He is agreeable to home health care. The Plan for Transition of Care is related to the following treatment goals: wound healing, strengthening    The Patient was provided with a choice of provider and agrees   with the discharge plan. [x] Yes [] No    Freedom of choice list was provided with basic dialogue that supports the patient's individualized plan of care/goals, treatment preferences and shares the quality data associated with the providers. [x] Yes [] No     Patient has no preference for [facility]. Referral sent to [facility]. Electronically signed by Alondra Dotson RN on 7/10/2024 at 5:06 PM

## 2024-07-10 NOTE — PROGRESS NOTES
Report to Koki LOWERY. All questions answered. 4 eyes performed @ bedside. Pt belongings accounted for and with patient. Dressing changed supplies w/ pt. This RN transferred pt to 4122 in ICU bed.     Electronically signed by Nellie Perrin RN on 7/10/2024 at 2:27 AM

## 2024-07-10 NOTE — PROGRESS NOTES
Comprehensive Nutrition Assessment    Type and Reason for Visit:  Positive Nutrition Screen    Nutrition Recommendations/Plan:   Continue current diet.  Start José Miguel BID.      Malnutrition Assessment:  Malnutrition Status:  No malnutrition (07/10/24 1256)    Context:  Acute Illness       Nutrition Assessment:    MST=3. Pt. admitted for NSTEMI. Currently receiving a 4 carb diet. Diet acceptance appears adequate. Hx. of diabetes noted, A1c 15.2%. -345 over the past 24 hours. Wounds noted to Lt. hip and foot. Recommend to start José Miguel BID to support wound healing. Will continue to monitor diet acceptance and nutritional adequacy.    Nutrition Related Findings:    Na 133, Cr <0.5, Mg 1.70, -345. LBM Wound Type: Unstageable, Diabetic Ulcer       Current Nutrition Intake & Therapies:    Average Meal Intake: 51-75%, %  Average Supplements Intake: None Ordered  ADULT DIET; Regular; 4 carb choices (60 gm/meal)  ADULT ORAL NUTRITION SUPPLEMENT; Breakfast, Lunch; Wound Healing Oral Supplement    Anthropometric Measures:  Height: 182.9 cm (6' 0.01\")  Ideal Body Weight (IBW): 178 lbs (81 kg)       Current Body Weight: 68.9 kg (151 lb 14.4 oz), 85.3 % IBW.    Current BMI (kg/m2): 20.6                          BMI Categories: Underweight (BMI less than 22) age over 65    Estimated Daily Nutrient Needs:  Energy Requirements Based On: Kcal/kg  Weight Used for Energy Requirements: Current  Energy (kcal/day): 9191-3976 kcal (25-30 kcal/kg)  Weight Used for Protein Requirements: Current  Protein (g/day):  g (1.25-1.5g/kg)  Method Used for Fluid Requirements: 1 ml/kcal  Fluid (ml/day): 1440    Nutrition Diagnosis:   Increased nutrient needs related to increase demand for energy/nutrients as evidenced by wounds    Nutrition Interventions:   Food and/or Nutrient Delivery: Continue Current Diet, Start Oral Nutrition Supplement  Nutrition Education/Counseling: Education not indicated  Coordination of Nutrition Care:  Continue to monitor while inpatient       Goals:     Goals: Meet at least 75% of estimated needs       Nutrition Monitoring and Evaluation:   Behavioral-Environmental Outcomes: None Identified  Food/Nutrient Intake Outcomes: Food and Nutrient Intake, Supplement Intake  Physical Signs/Symptoms Outcomes: Biochemical Data, GI Status, Skin    Discharge Planning:    Too soon to determine     Kiki Juarez RD  Contact: [phone]

## 2024-07-10 NOTE — PROGRESS NOTES
[facility]  Hyperglycemia Event      NAME: Don Tee  MEDICAL RECORD NUMBER:  [id number]  AGE: 68 y.o.   GENDER: male  : 1955  EPISODE DATE:  7/10/2024     Data     Recent Labs     24  0920 24  1040 24  1144 24  1710 24  1956 07/10/24  0758   POCGLU 125* 163* 160* 254* 276* 345*       HgBA1c:    Lab Results   Component Value Date/Time    LABA1C 15.2 2024 03:32 AM       BUN/Creatinine:    Lab Results   Component Value Date/Time    BUN 12 07/10/2024 05:06 AM    CREATININE <0.5 07/10/2024 05:06 AM       Medications  Scheduled Medications:   insulin lispro  0-4 Units SubCUTAneous TID WC    insulin lispro  0-4 Units SubCUTAneous Nightly    insulin glargine  20 Units SubCUTAneous QAM    cefepime  2,000 mg IntraVENous Q12H    tamsulosin  0.4 mg Oral Nightly    atorvastatin  20 mg Oral Nightly    vancomycin (VANCOCIN) intermittent dosing (placeholder)   Other RX Placeholder    vancomycin  1,000 mg IntraVENous Q8H    medihoney wound/burn dressing   Topical Daily    melatonin  19.5 mg Oral Nightly    vitamin D  5,000 Units Oral Daily    therapeutic multivitamin-minerals  1 tablet Oral Daily    buPROPion  100 mg Oral BID    primidone  50 mg Oral Daily    propranolol  60 mg Oral Daily    QUEtiapine  400 mg Oral Nightly    aspirin  81 mg Oral Daily    sodium chloride  15 mL/kg IntraVENous Once    sodium chloride flush  5-40 mL IntraVENous 2 times per day    thiamine  100 mg Oral Daily       Diet  Current diet/supplement order: ADULT DIET; Regular; 4 carb choices (60 gm/meal)     Recorded PO: PO Meals Eaten (%): 51 - 75% last meal in flowsheets      Action     Physician Notified of event: Yes   Alton Dailey MD    Recommend adding prandial Humalog.       Responce     Medication plan updated: Yes      Electronically signed by Cecy Lagunas RN on 7/10/2024 at 8:06 AM

## 2024-07-10 NOTE — PROGRESS NOTES
Occupational Therapy  Facility/Department: [address] MED SURG  Occupational Therapy Initial Assessment    Name: Don Tee  : 1955  MRN: [id number]  Date of Service: 7/10/2024    Discharge Recommendations:  Patient would benefit from continued therapy after discharge, 3-5 sessions per week (pt likely to refuse SNF, if he goes home, would benefit from HHOT and assist from neighbor)  OT Equipment Recommendations  Other: defer to d/c facility. If pt were to d/c home, he would need a RTS or BSC. Would be helpful for HHOT to assess BR and determine appropriate DME  Don Tee scored a 15/24 on the AM-PAC ADL Inpatient form. Current research shows that an AM-PAC score of 17 or less is typically not associated with a discharge to the patient's home setting. Based on the patient's AM-PAC score and their current ADL deficits, it is recommended that the patient have 3-5 sessions per week of Occupational Therapy at d/c to increase the patient's independence.  Please see assessment section for further patient specific details.    If patient discharges prior to next session this note will serve as a discharge summary.  Please see below for the latest assessment towards goals. ,    Patient Diagnosis(es): The primary encounter diagnosis was DKA, type 2, not at goal (HCC). Diagnoses of Elevated troponin, Metabolic acidosis, Hyperglycemia, Dehydration, Generalized weakness, Diabetic ketoacidosis without coma associated with type 2 diabetes mellitus (HCC), Acute coronary syndrome (HCC), and Type 2 diabetes mellitus with hyperglycemia, with long-term current use of insulin (HCC) were also pertinent to this visit.  Past Medical History:  has a past medical history of Allergic rhinitis, arthri, Arthritis, CAD (coronary artery disease), Chronic kidney disease, Constipation, DM (diabetes mellitus) (HCC), Generalized pain, GERD (gastroesophageal reflux disease), Heart attack (HCC), Heart disease, Hyperlipidemia, Movement disorder,  section  Additional Comments: Pt is anticipated to require up to Max A for full ADLs based on his current strength, endurance, and cognition.  Skin Care: N/A     Activity Tolerance  Activity Tolerance: Patient tolerated evaluation without incident  Activity Tolerance Comments: although the pt becomes easily agitated  Bed mobility  Supine to Sit: Moderate assistance (HOB elevated; needed asisst to get legs off the EOB and the pt pulled up on therapist)  Sit to Supine: Unable to assess (the pt ended session in the chair)  Transfers  Stand Pivot Transfers: Minimal assistance;Moderate assistance  Sit to stand: Minimal assistance;Moderate assistance;2 Person assistance  Stand to sit: Minimal assistance;Moderate assistance;2 Person assistance  Transfer Comments: to/from steven simon/ cues for safe hand placement, completed from recliner w/ RN present to ensure pt would be able to use the shawn w/ staff for bed><chair. Min A for SPT towards R side, Mod A for SPT to L side  Vision  Vision: Impaired  Vision Exceptions: Wears glasses at all times (blind in the R eye due to glaucoma)  Hearing  Hearing: Within functional limits  Cognition  Overall Cognitive Status: Exceptions  Following Commands: Follows one step commands with increased time  Safety Judgement: Decreased awareness of need for assistance;Decreased awareness of need for safety  Problem Solving: Decreased awareness of errors  Insights: Decreased awareness of deficits  Initiation: Requires cues for some  Sequencing: Requires cues for some  Cognition Comment: the pt becomes easily agitated  Orientation  Overall Orientation Status: Within Functional Limits                  Education Given To: Patient  Education Provided: Role of Therapy;Plan of Care;Transfer Training;Fall Prevention Strategies  Education Method: Demonstration;Verbal  Barriers to Learning: Cognition  Education Outcome: Verbalized understanding;Demonstrated understanding;Continued education needed

## 2024-07-10 NOTE — PROGRESS NOTES
A new male pt was transferred via bed, from ICU at this time. Taken hand off at bedside. 4 eyes done by 2 nurses. V/S are taken and recorded. BP slightly low. Inj. Heparin drip going at 1090 units/hr, handoff done. Belongings at bedside.

## 2024-07-10 NOTE — PROGRESS NOTES
Clinical Pharmacy Note  Vancomycin Consult    Don Tee is a 68 y.o. male ordered vancomycin for SSTI; consult received from Dr. Dailey to manage therapy. Also receiving Cefepime.    Allergies:  Metoclopramide and No known allergies     Temp max:  Temp (24hrs), Av.9 °F (36.6 °C), Min:97.6 °F (36.4 °C), Max:98 °F (36.7 °C)      Recent Labs     24  1651 24  0332 07/10/24  0506   WBC 9.7 9.5 8.3         Recent Labs     24  0333 24  0607 07/10/24  0506   BUN 14 13 12   CREATININE <0.5* <0.5* <0.5*           Intake/Output Summary (Last 24 hours) at 7/10/2024 1505  Last data filed at 7/10/2024 0758  Gross per 24 hour   Intake 2529.49 ml   Output 1800 ml   Net 729.49 ml         Culture Results:  pending    Ht Readings from Last 1 Encounters:   07/10/24 1.829 m (6' 0.01\")        Wt Readings from Last 1 Encounters:   07/10/24 68.9 kg (152 lb)         Estimated Creatinine Clearance: 136 mL/min (based on SCr of 0.5 mg/dL).    Assessment/Plan:  Day # 2 of vancomycin.  Vancomycin random level 14.4mg/L 4 hours post dose (3 doses given so far)  Continue Vancomycin 1000 mg IV every 8 hours.    Goal -600  Predicted (24ss hr)  Level ordered for 1400 on    Creatinine prior to 0900 dose   Thank you for the consult.       Wally Sood [facility], 7/10/2024 3:12 PM

## 2024-07-10 NOTE — PROGRESS NOTES
Clinical Pharmacy Note  Heparin Dosing       Lab Results   Component Value Date/Time    ANTIXAUHEP 0.25 07/10/2024 05:06 AM      Lab Results   Component Value Date/Time    HGB 11.1 07/09/2024 03:32 AM    HCT 32.9 07/09/2024 03:32 AM     07/09/2024 03:32 AM       Current Infusion Rate: 1090 units/hr    Plan:  Bolus: 1800 units  Rate: 1210 units/hr  Next anti-Xa level: 1200 07/10/24    Pharmacy will continue to monitor and adjust based on anti-Xa results.    Whitney Moses, PharmD

## 2024-07-10 NOTE — PROGRESS NOTES
FINDINGS: There is attenuation of the pulmonary vasculature suggesting emphysematous changes.  There is hyperinflation of the lungs with flattening of the diaphragms suggesting COPD. The lungs are without acute focal process.  There is no effusion or pneumothorax. The cardiomediastinal silhouette is stable. The osseous structures are stable.     No acute process. Findings of COPD and emphysema.     CT HEAD WO CONTRAST    Result Date: 7/8/2024  EXAMINATION: CT OF THE HEAD WITHOUT CONTRAST  7/8/2024 4:44 pm TECHNIQUE: CT of the head was performed without the administration of intravenous contrast. Automated exposure control, iterative reconstruction, and/or weight based adjustment of the mA/kV was utilized to reduce the radiation dose to as low as reasonably achievable. COMPARISON: 02/10/2021. HISTORY: ORDERING SYSTEM PROVIDED HISTORY: Generalized weakness cannot tell me a specific time.  He thinks it was today. TECHNOLOGIST PROVIDED HISTORY: Has a \"code stroke\" or \"stroke alert\" been called?->No Reason for exam:->Generalized weakness cannot tell me a specific time.  He thinks it was today. Decision Support Exception - unselect if not a suspected or confirmed emergency medical condition->Emergency Medical Condition (MA) Reason for Exam: Generalized weakness cannot tell me a specific time. He thinks it was today FINDINGS: BRAIN/VENTRICLES: There is no acute intracranial hemorrhage, mass effect or midline shift. No abnormal extra-axial fluid collection.  The gray-white differentiation is maintained without evidence of an acute infarct. There is prominence of the ventricles and sulci due to global parenchymal volume loss. There are nonspecific areas of hypoattenuation within the periventricular and subcortical white matter, which likely represent chronic microvascular ischemic change. ORBITS: The visualized portion of the orbits demonstrate no acute abnormality.  The right globe is small and deformed. SINUSES: The

## 2024-07-11 VITALS
SYSTOLIC BLOOD PRESSURE: 126 MMHG | RESPIRATION RATE: 18 BRPM | HEIGHT: 72 IN | HEART RATE: 72 BPM | WEIGHT: 152 LBS | OXYGEN SATURATION: 95 % | TEMPERATURE: 97.6 F | BODY MASS INDEX: 20.59 KG/M2 | DIASTOLIC BLOOD PRESSURE: 72 MMHG

## 2024-07-11 LAB
BACTERIA SPEC AEROBE CULT: ABNORMAL
GLUCOSE BLD-MCNC: 144 MG/DL (ref 70–99)
GLUCOSE BLD-MCNC: 68 MG/DL (ref 70–99)
GRAM STN SPEC: ABNORMAL
ORGANISM: ABNORMAL
ORGANISM: ABNORMAL
PERFORMED ON: ABNORMAL
PERFORMED ON: ABNORMAL
VANCOMYCIN SERPL-MCNC: 25.4 UG/ML

## 2024-07-11 PROCEDURE — 2580000003 HC RX 258: Performed by: STUDENT IN AN ORGANIZED HEALTH CARE EDUCATION/TRAINING PROGRAM

## 2024-07-11 PROCEDURE — 6370000000 HC RX 637 (ALT 250 FOR IP): Performed by: FAMILY MEDICINE

## 2024-07-11 PROCEDURE — 6370000000 HC RX 637 (ALT 250 FOR IP): Performed by: STUDENT IN AN ORGANIZED HEALTH CARE EDUCATION/TRAINING PROGRAM

## 2024-07-11 PROCEDURE — 36415 COLL VENOUS BLD VENIPUNCTURE: CPT

## 2024-07-11 PROCEDURE — 6370000000 HC RX 637 (ALT 250 FOR IP)

## 2024-07-11 PROCEDURE — 80202 ASSAY OF VANCOMYCIN: CPT

## 2024-07-11 PROCEDURE — 6360000002 HC RX W HCPCS: Performed by: STUDENT IN AN ORGANIZED HEALTH CARE EDUCATION/TRAINING PROGRAM

## 2024-07-11 PROCEDURE — 94760 N-INVAS EAR/PLS OXIMETRY 1: CPT

## 2024-07-11 RX ORDER — INSULIN GLARGINE 100 [IU]/ML
20 INJECTION, SOLUTION SUBCUTANEOUS NIGHTLY
Qty: 30 ML | Refills: 2 | Status: SHIPPED | OUTPATIENT
Start: 2024-07-11 | End: 2024-07-11

## 2024-07-11 RX ORDER — INSULIN LISPRO 100 [IU]/ML
6 INJECTION, SOLUTION INTRAVENOUS; SUBCUTANEOUS
Status: DISCONTINUED | OUTPATIENT
Start: 2024-07-11 | End: 2024-07-11 | Stop reason: HOSPADM

## 2024-07-11 RX ORDER — METOPROLOL SUCCINATE 25 MG/1
25 TABLET, EXTENDED RELEASE ORAL DAILY
Qty: 30 TABLET | Refills: 3 | Status: SHIPPED | OUTPATIENT
Start: 2024-07-12

## 2024-07-11 RX ORDER — INSULIN GLARGINE 100 [IU]/ML
20 INJECTION, SOLUTION SUBCUTANEOUS NIGHTLY
Status: DISCONTINUED | OUTPATIENT
Start: 2024-07-12 | End: 2024-07-11 | Stop reason: HOSPADM

## 2024-07-11 RX ORDER — INSULIN GLARGINE 100 [IU]/ML
20 INJECTION, SOLUTION SUBCUTANEOUS EVERY MORNING
Qty: 30 ML | Refills: 2 | Status: SHIPPED | OUTPATIENT
Start: 2024-07-11

## 2024-07-11 RX ORDER — THIAMINE MONONITRATE (VIT B1) 100 MG
100 TABLET ORAL DAILY
Qty: 90 TABLET | Refills: 0 | Status: SHIPPED | OUTPATIENT
Start: 2024-07-12

## 2024-07-11 RX ORDER — METOPROLOL SUCCINATE 25 MG/1
12.5 TABLET, EXTENDED RELEASE ORAL DAILY
Status: DISCONTINUED | OUTPATIENT
Start: 2024-07-11 | End: 2024-07-11 | Stop reason: HOSPADM

## 2024-07-11 RX ORDER — CEPHALEXIN 500 MG/1
500 CAPSULE ORAL 4 TIMES DAILY
Qty: 28 CAPSULE | Refills: 0 | Status: SHIPPED | OUTPATIENT
Start: 2024-07-11 | End: 2024-07-18

## 2024-07-11 RX ADMIN — Medication 5000 UNITS: at 09:25

## 2024-07-11 RX ADMIN — ASPIRIN 81 MG: 81 TABLET, COATED ORAL at 09:25

## 2024-07-11 RX ADMIN — PRIMIDONE 50 MG: 50 TABLET ORAL at 09:25

## 2024-07-11 RX ADMIN — PROPRANOLOL HYDROCHLORIDE 60 MG: 60 CAPSULE, EXTENDED RELEASE ORAL at 09:33

## 2024-07-11 RX ADMIN — INSULIN LISPRO 6 UNITS: 100 INJECTION, SOLUTION INTRAVENOUS; SUBCUTANEOUS at 11:32

## 2024-07-11 RX ADMIN — BUPROPION HYDROCHLORIDE 100 MG: 100 TABLET, FILM COATED, EXTENDED RELEASE ORAL at 09:25

## 2024-07-11 RX ADMIN — VANCOMYCIN HYDROCHLORIDE 1000 MG: 1 INJECTION, POWDER, LYOPHILIZED, FOR SOLUTION INTRAVENOUS at 09:24

## 2024-07-11 RX ADMIN — EMPAGLIFLOZIN 10 MG: 10 TABLET, FILM COATED ORAL at 09:25

## 2024-07-11 RX ADMIN — CEFEPIME 2000 MG: 2 INJECTION, POWDER, FOR SOLUTION INTRAVENOUS at 02:56

## 2024-07-11 RX ADMIN — Medication: at 09:24

## 2024-07-11 RX ADMIN — Medication 100 MG: at 09:25

## 2024-07-11 RX ADMIN — Medication 1 TABLET: at 09:26

## 2024-07-11 RX ADMIN — VANCOMYCIN HYDROCHLORIDE 1000 MG: 1 INJECTION, POWDER, LYOPHILIZED, FOR SOLUTION INTRAVENOUS at 00:50

## 2024-07-11 RX ADMIN — METOPROLOL SUCCINATE 12.5 MG: 25 TABLET, EXTENDED RELEASE ORAL at 09:33

## 2024-07-11 ASSESSMENT — PAIN SCALES - GENERAL: PAINLEVEL_OUTOF10: 0

## 2024-07-11 NOTE — DISCHARGE SUMMARY
V2.0  Discharge Summary    Name:  Don Tee /Age/Sex: 1955 (68 y.o. male)   Admit Date: 2024  Discharge Date: 24    MRN & CSN:  [id number] & [id number] Encounter Date and Time 24 9:58 AM EDT    Attending:  Alton Dailey MD Discharging Provider: Alton Dailey MD       Hospital Course:     Brief HPI: Don Tee is a 68 y.o. male who presented with generalized weakness and confusion diagnosed with DKA and NSTEMI    Brief Problem Based Course:   DKA type II: Patient stopped taking his insulin at home, stating that he was \"done with taking medications\".  He was found to have blood sugar significantly elevated, positive beta hydroxybutyrate, and anion gap.  Patient was treated with insulin drip and IV fluids according to DKA protocol.  Patient was transitioned back to basal bolus insulin.  Patient prior to discharge stated that he would take his medications.  Patient will follow-up with his PCP for further care  NSTEMI in setting of CAD with previous PCI's: As part of initial ED workup patient was discovered to have elevated troponins which trended up on repeat.  Cardiology was consulted recommended catheterization due to patient's history of CAD with stents.  However by this time patient's mental status had improved and he adamantly refused procedural intervention.  Patient was put on heparin drip for 48 hours.  Later on admission patient became more amenable to intervention in the future, but still refused to have cardiac catheterization during this admission.  Patient will follow-up with cardiology as outpatient  Chronic heart failure with reduced ejection fraction: Suspected ischemic.  Patient initially refused, but later agreed to echocardiogram which showed reduced ejection fraction of 35 to 40% with global hypokinesis, G2 DD.  Patient already was on SGLT2 I for diabetes and low-dose beta-blocker was initiated.  ACE/ARB/ARNI and MRA were contraindicated due to low blood pressures.

## 2024-07-11 NOTE — PLAN OF CARE
nonpharmacologic comfort measures as appropriate  Goal: Maintains or returns to baseline bowel function  Outcome: Progressing  Flowsheets (Taken 7/10/2024 0900)  Maintains or returns to baseline bowel function: Assess bowel function     Problem: Genitourinary - Adult  Goal: Absence of urinary retention  Outcome: Progressing  Flowsheets (Taken 7/10/2024 0900)  Absence of urinary retention: Assess patient’s ability to void and empty bladder     Problem: Infection - Adult  Goal: Absence of infection at discharge  Outcome: Progressing  Flowsheets (Taken 7/10/2024 0900)  Absence of infection at discharge:   Assess and monitor for signs and symptoms of infection   Monitor lab/diagnostic results   Administer medications as ordered   Instruct and encourage patient and family to use good hand hygiene technique     Problem: Metabolic/Fluid and Electrolytes - Adult  Goal: Electrolytes maintained within normal limits  Outcome: Progressing  Flowsheets (Taken 7/10/2024 0900)  Electrolytes maintained within normal limits:   Monitor labs and assess patient for signs and symptoms of electrolyte imbalances   Administer electrolyte replacement as ordered   Monitor response to electrolyte replacements, including repeat lab results as appropriate  Goal: Glucose maintained within prescribed range  Outcome: Progressing  Flowsheets (Taken 7/10/2024 0900)  Glucose maintained within prescribed range:   Monitor blood glucose as ordered   Assess for signs and symptoms of hyperglycemia and hypoglycemia   Administer ordered medications to maintain glucose within target range   Assess barriers to adequate nutritional intake and initiate nutrition consult as needed   Instruct patient on self management of diabetes and initiate consult as needed     Problem: Hematologic - Adult  Goal: Maintains hematologic stability  Outcome: Progressing  Flowsheets (Taken 7/10/2024 0900)  Maintains hematologic stability: Assess for signs and symptoms of bleeding  Kiki Juarez, ELIZABETH  Outcome: Progressing  Flowsheets (Taken 7/10/2024 1257)  Nutrient intake appropriate for improving, restoring, or maintaining nutritional needs:   Assess nutritional status and recommend course of action   Monitor oral intake, labs, and treatment plans   Recommend appropriate diets, oral nutritional supplements, and vitamin/mineral supplements   Recommend, monitor, and adjust tube feedings and TPN/PPN based on assessed needs   Order, calculate, and assess calorie counts as needed   Provide specific nutrition education to patient or family as appropriate     Problem: Cognitive:  Goal: Understands risk factors for wounds  Description: Understands risk factors for wounds  Outcome: Progressing     Problem: Pressure Ulcer:  Goal: Signs of wound healing will improve  Description: Signs of wound healing will improve  Outcome: Progressing

## 2024-07-11 NOTE — PLAN OF CARE
baseline  7/11/2024 1007 by Amy Radford RN  Outcome: Progressing  7/11/2024 0248 by Allan Mccoy RN  Outcome: Progressing  7/10/2024 2034 by Diana Stack RN  Outcome: Progressing  Flowsheets (Taken 7/10/2024 0900)  Absence of cardiac dysrhythmias or at baseline:   Monitor cardiac rate and rhythm   Assess for signs of decreased cardiac output   Administer antiarrhythmia medication and electrolyte replacement as ordered     Problem: Skin/Tissue Integrity - Adult  Goal: Skin integrity remains intact  7/11/2024 1007 by Amy Radford RN  Outcome: Progressing  7/11/2024 0248 by Allan Mccoy RN  Outcome: Progressing  7/10/2024 2034 by Diana Stack RN  Outcome: Progressing  Flowsheets (Taken 7/10/2024 0900)  Skin Integrity Remains Intact: Monitor for areas of redness and/or skin breakdown  Goal: Oral mucous membranes remain intact  7/11/2024 1007 by Amy Radford RN  Outcome: Progressing  7/11/2024 0248 by Allan Mccoy RN  Outcome: Progressing  7/10/2024 2034 by Diana Stack RN  Outcome: Progressing  Flowsheets (Taken 7/10/2024 0900)  Oral Mucous Membranes Remain Intact: Assess oral mucosa and hygiene practices     Problem: Musculoskeletal - Adult  Goal: Return mobility to safest level of function  7/11/2024 1007 by Amy Radford RN  Outcome: Progressing  7/11/2024 0248 by Allan Mccoy RN  Outcome: Progressing  7/10/2024 2034 by Diana Stack RN  Outcome: Progressing  Flowsheets (Taken 7/10/2024 0900)  Return Mobility to Safest Level of Function:   Assess patient stability and activity tolerance for standing, transferring and ambulating with or without assistive devices   Assist with transfers and ambulation using safe patient handling equipment as needed  Goal: Return ADL status to a safe level of function  7/11/2024 1007 by Amy Radford RN  Outcome: Progressing  7/11/2024 0248 by Allan Mccoy RN  Outcome: Progressing  7/10/2024 2034 by  by Amy Radford RN  Outcome: Progressing  7/11/2024 0248 by Allan Mccoy RN  Outcome: Progressing  7/10/2024 2034 by Diana Stack RN  Outcome: Progressing  Flowsheets (Taken 7/10/2024 0900)  Absence of Physical Injury: Implement safety measures based on patient assessment     Problem: Nutrition Deficit:  Goal: Optimize nutritional status  7/11/2024 1007 by Amy Radford RN  Outcome: Progressing  7/11/2024 0248 by Allan Mccoy RN  Outcome: Progressing  7/10/2024 2034 by Diaan Stack RN  Outcome: Progressing     Problem: Cognitive:  Goal: Understands risk factors for wounds  Description: Understands risk factors for wounds  7/11/2024 1007 by Amy Radford RN  Outcome: Progressing  7/11/2024 0248 by Allan Mccoy RN  Outcome: Progressing  7/10/2024 2034 by Diana Stack RN  Outcome: Progressing     Problem: Pressure Ulcer:  Goal: Signs of wound healing will improve  Description: Signs of wound healing will improve  7/11/2024 1007 by Amy Radford RN  Outcome: Progressing  7/11/2024 0248 by Allan Mccoy RN  Outcome: Progressing  7/10/2024 2034 by Diana Stack RN  Outcome: Progressing

## 2024-07-11 NOTE — ACP (ADVANCE CARE PLANNING)
Advance Care Planning     Advance Care Planning Inpatient Note  [facility] Department    Today's Date: 7/11/2024  Unit: [facility] MED SURG    Received request from HealthCare Provider.  Upon review of chart and communication with care team, patient's decision making abilities are not in question.. Patient was/were present in the room during visit.    Goals of ACP Conversation:  Discuss advance care planning documents    Health Care Decision Makers:       Primary Decision Maker: Lyndsey Jensen [facility] [phone]  Summary:  Completed New Documents  Updated Healthcare Decision Maker    Advance Care Planning Documents (Patient Wishes):  Healthcare Power of /Advance Directive Appointment of Health Care Agent     Assessment:  Patient completed HCPOA docs per patient's wishes.     Interventions:  Provided education on documents for clarity and greater understanding  Discussed and provided education on state decision maker hierarchy  Assisted in the completion of documents according to patient's wishes at this time    Care Preferences Communicated:   No    Outcomes/Plan:  ACP Discussion: Completed  Returned original document(s) to patient, as well as copies for distribution to appointed agents  Copy placed in patient's soft chart    Electronically signed by Chaplain Cade on 7/11/2024 at 9:22 AM

## 2024-07-11 NOTE — PROGRESS NOTES
CLINICAL PHARMACY NOTE: MEDS TO BEDS    Total # of Prescriptions Filled: 5   The following medications were delivered to the patient:  Current Discharge Medication List        START taking these medications    Details   metoprolol succinate (TOPROL XL) 25 MG extended release tablet Take 1 tablet by mouth daily  Qty: 30 tablet, Refills: 3      thiamine mononitrate (THIAMINE) 100 MG tablet Take 1 tablet by mouth daily  Qty: 90 tablet, Refills: 0      cephALEXin (KEFLEX) 500 MG capsule Take 1 capsule by mouth 4 times daily for 7 days  Qty: 28 capsule, Refills: 0         Basaglar insulin  Pen needles    Additional Documentation:  Medications delivered at bedside

## 2024-07-11 NOTE — PLAN OF CARE
Problem: Discharge Planning  Goal: Discharge to home or other facility with appropriate resources  7/11/2024 1146 by Amy Radford RN  Outcome: Adequate for Discharge  7/11/2024 1007 by Amy Radford RN  Outcome: Progressing  7/11/2024 0248 by Allan Mccoy RN  Outcome: Progressing     Problem: Skin/Tissue Integrity  Goal: Absence of new skin breakdown  Description: 1.  Monitor for areas of redness and/or skin breakdown  2.  Assess vascular access sites hourly  3.  Every 4-6 hours minimum:  Change oxygen saturation probe site  4.  Every 4-6 hours:  If on nasal continuous positive airway pressure, respiratory therapy assess nares and determine need for appliance change or resting period.  7/11/2024 1146 by Amy Radford RN  Outcome: Adequate for Discharge  7/11/2024 1007 by Amy Radford RN  Outcome: Progressing  7/11/2024 0248 by Allan Mccoy RN  Outcome: Progressing     Problem: Pain  Goal: Verbalizes/displays adequate comfort level or baseline comfort level  7/11/2024 1146 by Amy Radford RN  Outcome: Adequate for Discharge  7/11/2024 1007 by Amy Radford RN  Outcome: Progressing  7/11/2024 0248 by Allan Mccoy RN  Outcome: Progressing     Problem: Safety - Adult  Goal: Free from fall injury  7/11/2024 1146 by Amy Radford RN  Outcome: Adequate for Discharge  7/11/2024 1007 by Amy Radford RN  Outcome: Progressing  7/11/2024 0248 by Allan Mccoy RN  Outcome: Progressing     Problem: Neurosensory - Adult  Goal: Achieves stable or improved neurological status  7/11/2024 1146 by Amy Radford RN  Outcome: Adequate for Discharge  7/11/2024 1007 by Amy Radford RN  Outcome: Progressing  7/11/2024 0248 by Allan Mccoy RN  Outcome: Progressing  Goal: Achieves maximal functionality and self care  7/11/2024 1146 by Amy Radford RN  Outcome: Adequate for Discharge  7/11/2024 1007 by Amy Radford RN  Outcome: Progressing  7/11/2024

## 2024-07-11 NOTE — CARE COORDINATION
Case Management Discharge Note          Date / Time of Note: 7/11/2024 11:42 AM                  Patient Name: Don Tee   YOB: 1955  Diagnosis: Dehydration [E86.0]  Metabolic acidosis [E87.20]  Hyperglycemia [R73.9]  Generalized weakness [R53.1]  Elevated troponin [R79.89]  DKA, type 2, not at goal (HCC) [E11.10]  Diabetic ketoacidosis without coma associated with type 2 diabetes mellitus (HCC) [E11.10]   Date / Time: 7/8/2024  4:09 PM    Financial:  Payor: [employer] MEDICARE / Plan: [employer] MEDICARE ADVANTAGE HMO / Product Type: Medicare /      Pharmacy:    [facility] [address] - P [phone] - F [phone]  [address]  [facility] [address]  Phone: [phone] Fax: [phone]    [facility] - [facility] [address] - P [phone] - F [phone]  [address] [facility] [address]  Phone: [phone] Fax: [phone]      Assistance purchasing medications?: Potential Assistance Purchasing Medications: No  Assistance provided by Case Management: None at this time    DISCHARGE Disposition: Home with Home Health Care    Home Care:  Home Care ordered at discharge: Yes  Home Care Agency: [facility]  Phone: [phone]  Fax: [phone]  Orders faxed: Yes    Referrals made at DISCHARGE for outpatient continued care:  [facility] to assist with needed DME not covered by insurance.     Transportation:  Transportation PLAN for discharge: EMS transportation   Mode of Transport: [facility] [place]  Reason for medical transport: Other: Patient came to us for generalized weakness and was diagnosed with metabolic acidosis and suffered from a heart attack. He remains confused and is now a max assist to ambulate per therapies.  Name of Transport Company: [facility]  Phone: [phone]  Time of Transport: 1645    Transport form completed: Yes    IMM Completed:   Yes, Case management has presented and

## 2024-07-11 NOTE — DISCHARGE INSTR - COC
Continuity of Care Form    Patient Name: Don Tee   :  1955  MRN:  [id number]    Admit date:  2024  Discharge date:  ***    Code Status Order: DNR-CC   Advance Directives:     Admitting Physician:  Gege Kurtz MD  PCP: Omid Barry, APRN - CNP    Discharging Nurse: ***  Discharging Hospital Unit/Room#: P3G-1424/4122-01  Discharging Unit Phone Number: ***    Emergency Contact:   Extended Emergency Contact Information  Primary Emergency Contact: Lyndsey Jensen  Home Phone: [phone]  Mobile Phone: [phone]  Relation: Neighbor    Past Surgical History:  Past Surgical History:   Procedure Laterality Date    BACK SURGERY      lamenectomy     CARDIAC SURGERY      stents  &     CORONARY ANGIOPLASTY WITH STENT PLACEMENT      SKIN FULL GRAFT      bilateral legs       Immunization History:   Immunization History   Administered Date(s) Administered    COVID-19, MODERNA BLUE border, Primary or Immunocompromised, (age 12y+), IM, 100 mcg/0.5mL 05/10/2021, 2021, 2022    Influenza Virus Vaccine 2007, 10/15/2008, 2009, 2010, 2012, 10/24/2012, 10/14/2013, 10/07/2014    Influenza Whole 2009, 2010, 2012, 10/14/2013    Influenza, FLUAD, (age 65 y+), Adjuvanted, 0.5mL 2023, 2023    Influenza, FLUARIX, FLULAVAL, FLUZONE (age 6 mo+) AND AFLURIA, (age 3 y+), PF, 0.5mL 10/18/2016, 2018    Influenza, FLUBLOK, (age 18 y+), PF, 0.5mL 2018, 2020    Influenza, FLUCELVAX, (age 6 mo+), MDCK, PF, 0.5mL 2021    Pneumococcal, PCV-13, PREVNAR 13, (age 6w+), IM, 0.5mL 2023    Pneumococcal, PPSV23, PNEUMOVAX 23, (age 2y+), SC/IM, 0.5mL 2007, 10/14/2011, 2012, 2012, 2014, 2020    TDaP, ADACEL (age 10y-64y), BOOSTRIX (age 10y+), IM, 0.5mL 2010, 2011       Active Problems:  Patient Active Problem List   Diagnosis Code    Bipolar affective disorder (HCC) F31.9     Nutrition Therapy:   { [facility] Diet List:457644910}    Routes of Feeding: {[facility] DME Other Feedings:264151110}  Liquids: {Slp liquid thickness:55799}  Daily Fluid Restriction: {P DME Yes amt example:190404084}  Last Modified Barium Swallow with Video (Video Swallowing Test): {Done Not Done Date:}    Treatments at the Time of Hospital Discharge:   Respiratory Treatments: ***  Oxygen Therapy:  {Therapy; copd oxygen:56625}  Ventilator:    { CC Vent List:936870918}    Rehab Therapies: {THERAPEUTIC INTERVENTION:3329995278}  Weight Bearing Status/Restrictions: {[facility] Weight Bearin}  Other Medical Equipment (for information only, NOT a DME order):  {EQUIPMENT:118446935}  Other Treatments: ***    Patient's personal belongings (please select all that are sent with patient):  {[facility] DME Belongings:251773566}    RN SIGNATURE:  {Esignature:359785790}    CASE MANAGEMENT/SOCIAL WORK SECTION    Inpatient Status Date: ***    Readmission Risk Assessment Score:  Readmission Risk              Risk of Unplanned Readmission:  20           Discharging to Facility/ Agency   Name:   Address:  Phone:  Fax:    Dialysis Facility (if applicable)   Name:  Address:  Dialysis Schedule:  Phone:  Fax:    / signature: {Esignature:473049926}    PHYSICIAN SECTION    Prognosis: Fair    Condition at Discharge: Stable    Rehab Potential (if transferring to Rehab): Fair    Recommended Labs or Other Treatments After Discharge:   PT/OT  Assistance with meds    Physician Certification: I certify the above information and transfer of Don Tee  is necessary for the continuing treatment of the diagnosis listed and that he requires Skilled Nursing Facility. However patient refuses and so will try with Home Health Care  less 30 days.     Update Admission H&P: No change in H&P    PHYSICIAN SIGNATURE:  Electronically signed by Alton Dailey MD on 24 at 9:51 AM EDT

## 2024-07-11 NOTE — PLAN OF CARE
Problem: Discharge Planning  Goal: Discharge to home or other facility with appropriate resources  Outcome: Progressing     Problem: Skin/Tissue Integrity  Goal: Absence of new skin breakdown  Description: 1.  Monitor for areas of redness and/or skin breakdown  2.  Assess vascular access sites hourly  3.  Every 4-6 hours minimum:  Change oxygen saturation probe site  4.  Every 4-6 hours:  If on nasal continuous positive airway pressure, respiratory therapy assess nares and determine need for appliance change or resting period.  Outcome: Progressing     Problem: Pain  Goal: Verbalizes/displays adequate comfort level or baseline comfort level  Outcome: Progressing     Problem: Safety - Adult  Goal: Free from fall injury  Outcome: Progressing     Problem: Neurosensory - Adult  Goal: Achieves stable or improved neurological status  Outcome: Progressing  Goal: Achieves maximal functionality and self care  Outcome: Progressing     Problem: Respiratory - Adult  Goal: Achieves optimal ventilation and oxygenation  Outcome: Progressing     Problem: Cardiovascular - Adult  Goal: Maintains optimal cardiac output and hemodynamic stability  Outcome: Progressing  Goal: Absence of cardiac dysrhythmias or at baseline  Outcome: Progressing     Problem: Skin/Tissue Integrity - Adult  Goal: Skin integrity remains intact  Outcome: Progressing  Goal: Oral mucous membranes remain intact  Outcome: Progressing     Problem: Musculoskeletal - Adult  Goal: Return mobility to safest level of function  Goal: Return ADL status to a safe level of function  Outcome: Progressing     Problem: Gastrointestinal - Adult  Goal: Minimal or absence of nausea and vomiting  Outcome: Progressing    Goal: Maintains or returns to baseline bowel function  Outcome: Progressing     Problem: Genitourinary - Adult  Goal: Absence of urinary retention  Outcome: Progressing     Problem: Infection - Adult  Goal: Absence of infection at discharge  Outcome:

## 2024-07-11 NOTE — PROGRESS NOTES
IV taken out without complications. AVS reviewed with patient. Prescriptions sent with patient to home. Report  given to transport. Belongings gathered. Neighbor will meet him there with house keys. Patient verbalized understanding.

## 2024-07-12 ENCOUNTER — CARE COORDINATION (OUTPATIENT)
Dept: CASE MANAGEMENT | Age: 69
End: 2024-07-12

## 2024-07-12 LAB
BACTERIA BLD CULT ORG #2: NORMAL
BACTERIA BLD CULT: NORMAL

## 2024-07-12 NOTE — CARE COORDINATION
Care Transitions Note    Initial Call - Call within 2 business days of discharge: Yes    Patient Current Location:  [place]    Care Transition Nurse contacted the patient by telephone to perform post hospital discharge assessment, verified name and  as identifiers. Provided introduction to self, and explanation of the Care Transition Nurse role.     Patient: Don Tee    Patient : 1955   MRN: [id number]    Reason for Admission: Weakness & Confusion  Discharge Date: 24  RURS: Readmission Risk Score: 14.1      Last Discharge Facility       Date Complaint Diagnosis Description Type Department Provider    24 Fatigue DKA, type 2, not at goal (HCC) ... ED to Hosp-Admission (Discharged) (ADMITTED) [facility] Alton Dailey MD; Gianna Hearn...            Was this an external facility discharge? No    Additional needs identified to be addressed with provider   No needs identified             Method of communication with provider: none.    Patients top risk factors for readmission:  Don declined completing CT discharge phone call or any additional CTN outreach.    Interventions to address risk factors:   Don declined completing CT discharge phone call or any additional CTN outreach.    Care Summary Note: Spoke briefly with Don.  He states \"I am doing half way decent.\" He declines completing CT discharge phone call and any additional CT outreach.      Advance Care Planning:   Does patient have an Advance Directive: Don declined completing CT discharge phone call or any additional CTN outreach..    Medication Reconciliation:  Medication reconciliation was not performed during this call, Don declined completing CT discharge phone call or any additional CTN outreach.     Remote Patient Monitoring:  Offered patient enrollment in the Remote Patient Monitoring (RPM) program for in-home monitoring:declined CT services.    Assessments:  Care Transitions 24 Hour Call    Do you have all of your prescriptions and

## 2024-07-15 ENCOUNTER — TELEPHONE (OUTPATIENT)
Dept: INTERNAL MEDICINE CLINIC | Age: 69
End: 2024-07-15

## 2024-07-15 ENCOUNTER — CARE COORDINATION (OUTPATIENT)
Dept: CARE COORDINATION | Age: 69
End: 2024-07-15

## 2024-07-15 NOTE — CARE COORDINATION
Ambulatory Care Coordination Note     7/15/2024 11:40 AM     Patient outreach attempt by this ACM today to perform care management follow up . [facility] was unable to reach the patient by telephone today; left voice message requesting a return phone call to this ACM.     ACM: Sandi Menchaca RN     Care Summary Note:     Admit [facility] 7/8-7/11; NSTEMI. Incoming staff message received from CTN. Patient declined care transitions.   ACM attempted to outreach patient. No answer, lvm. Attempt #1    Good afternoon Sandi-      Just wanted to let you know Don declined completing the CT discharge phone call or any additional CT outreach. He has an appt with  on 08/01. I sent a message to Omid's office requesting a HFU.     Thank You     Megan Whitman RN BSN   Care Transition Nurse   [phone]       PCP/Specialist follow up:   Future Appointments         Provider Specialty Dept Phone    8/1/2024 3:30 PM Braxton Zepeda MD Cardiology [phone]            Follow Up:   Plan for next AC outreach in approximately 1 week to complete:  - outreach attempt to offer care management services, Medicaid, HFU.

## 2024-07-15 NOTE — TELEPHONE ENCOUNTER
[place] with [facility] calling for verbal order for home care. Aware Omid is out of the office. Please advise and call with verbal at [phone].

## 2024-07-19 ENCOUNTER — CARE COORDINATION (OUTPATIENT)
Dept: CARE COORDINATION | Age: 69
End: 2024-07-19

## 2024-07-19 NOTE — CARE COORDINATION
Ambulatory Care Coordination Note     2024 1:22 PM     Patient Current Location:  [place]     ACM contacted the patient by telephone. Verified name and  with patient as identifiers.         ACM: Sandi Menchaca RN     Challenges to be reviewed by the provider   Additional needs identified to be addressed with provider No  none               Method of communication with provider: none.    Care Summary Note:     Patient hospitalized at [facility] - for DKA. Patient declined Care Transitions follow up.   ACM outreached patient; reintroduced self. Patient discharged to home on  with [facility]. Patient states skilled nursing is coming weekly for wound and diabetic management. Patient states he is performing wound care all other days. Patient states he has all the wound care supplies needed. No questions or concerns voiced.     Patient does not have hospital f/u scheduled. ACM offered to assist with scheduling. Patient disgruntled but eventually agreed to schedule. ACM advised patient that not completing a hospital with his pcp could interfere with [facility] coverage. AC offered to place patient on hold while ACM searched for available openings. Patient declined to hold and requested call back from [facility]. Patient requesting afternoon appointment.    Patient scheduled on  at 2:20 PM. ACM attempted to follow up with patient. No answer. Appointment date and time left on . ACM advised patient to contact myself of [facility] if this appointment date/time does not work.     Offered patient enrollment in the Remote Patient Monitoring (RPM) program for in-home monitoring: Will discuss during next outreach     Assessments Completed:   Not completed    Medications Reviewed:   Not completed during this call:      Advance Care Planning:   Not reviewed during this call     Care Planning:   Not completed during this call    PCP/Specialist follow up:   Future Appointments         Provider Specialty Dept Phone    2024 1:30 PM

## 2024-07-24 ENCOUNTER — OFFICE VISIT (OUTPATIENT)
Dept: INTERNAL MEDICINE CLINIC | Age: 69
End: 2024-07-24

## 2024-07-24 VITALS
BODY MASS INDEX: 21.4 KG/M2 | WEIGHT: 158 LBS | HEIGHT: 72 IN | OXYGEN SATURATION: 95 % | DIASTOLIC BLOOD PRESSURE: 58 MMHG | HEART RATE: 66 BPM | SYSTOLIC BLOOD PRESSURE: 102 MMHG

## 2024-07-24 DIAGNOSIS — Z09 HOSPITAL DISCHARGE FOLLOW-UP: Primary | ICD-10-CM

## 2024-07-24 DIAGNOSIS — E78.2 MIXED HYPERLIPIDEMIA: ICD-10-CM

## 2024-07-24 DIAGNOSIS — F33.9 RECURRENT MAJOR DEPRESSIVE EPISODES (HCC): ICD-10-CM

## 2024-07-24 DIAGNOSIS — E11.65 TYPE 2 DIABETES MELLITUS WITH HYPERGLYCEMIA, WITH LONG-TERM CURRENT USE OF INSULIN (HCC): ICD-10-CM

## 2024-07-24 DIAGNOSIS — Z79.4 TYPE 2 DIABETES MELLITUS WITH HYPERGLYCEMIA, WITH LONG-TERM CURRENT USE OF INSULIN (HCC): ICD-10-CM

## 2024-07-24 DIAGNOSIS — I10 ESSENTIAL HYPERTENSION: ICD-10-CM

## 2024-07-24 DIAGNOSIS — M79.18 BILATERAL BUTTOCK PAIN: ICD-10-CM

## 2024-07-24 RX ORDER — TRAMADOL HYDROCHLORIDE 50 MG/1
50 TABLET ORAL 2 TIMES DAILY PRN
Qty: 60 TABLET | Refills: 2 | Status: SHIPPED | OUTPATIENT
Start: 2024-07-24 | End: 2024-10-22

## 2024-07-24 RX ORDER — ACYCLOVIR 400 MG/1
1 TABLET ORAL
Qty: 3 EACH | Refills: 3 | Status: SHIPPED | OUTPATIENT
Start: 2024-07-24

## 2024-07-24 SDOH — ECONOMIC STABILITY: FOOD INSECURITY: WITHIN THE PAST 12 MONTHS, YOU WORRIED THAT YOUR FOOD WOULD RUN OUT BEFORE YOU GOT MONEY TO BUY MORE.: NEVER TRUE

## 2024-07-24 SDOH — ECONOMIC STABILITY: FOOD INSECURITY: WITHIN THE PAST 12 MONTHS, THE FOOD YOU BOUGHT JUST DIDN'T LAST AND YOU DIDN'T HAVE MONEY TO GET MORE.: NEVER TRUE

## 2024-07-24 SDOH — ECONOMIC STABILITY: INCOME INSECURITY: HOW HARD IS IT FOR YOU TO PAY FOR THE VERY BASICS LIKE FOOD, HOUSING, MEDICAL CARE, AND HEATING?: SOMEWHAT HARD

## 2024-07-24 NOTE — PATIENT INSTRUCTIONS
[facility] Financial Resources*  (Call [employer]/211 if need more resources.)      [employer] 211   Speak to a trained professional 24/7 who can connect you to essential community services including food, clothing, transportation, housing, utilities, employment services, childcare, and baby supplies. 211 serves nationwide.   [facility] for resources in [place], [facility], [place] and [facility] in [place]; [place], [place], [place], and [facility] in [place].   [facility]/resources for resources in [place], [place], [place], [place], [place], [place], [place], [place], [facility], [place], [place], and [facility] in [place].     [employer] Financial Assistance  What they offer: Financial assistance programs that are designed to assist you in finding resources that may help pay your hospital bill. Please click on the links below to learn more about the financial assistance programs available within our regions.  Phone Number: [phone]  How to apply for the [facility] Financial Assistance Program:       Option 1: To apply for financial assistance, a patient (or their family or other provider) should fill out the Financial Assistance Application. Copies of the Financial Assistance Application and the FAP may be obtained for free by calling the [facility] Customer Service department at [phone]   Option 2: The Financial Assistance Application and policy may be obtained for free by downloading a copy from the [employer] website:  https://www.[employer]/patient-resources/financial-assistance  [place] Health Care Assurance Program  What they offer:  Patients who need hospital care, but are unable to pay for it, may be eligible for free or reduced fee care at [facility] through the Hospital Care Assurance Program (HCAP). Applications for HCAP are accepted by the hospital where care was received, and patients seeking HCAP assistance should contact their hospital’s billing department for

## 2024-07-26 ENCOUNTER — TELEPHONE (OUTPATIENT)
Dept: INTERNAL MEDICINE CLINIC | Age: 69
End: 2024-07-26

## 2024-07-26 NOTE — TELEPHONE ENCOUNTER
Quita from [facility] calling in.    Current tx to left 2nd toe wound is wet to dry dressing daily and area is not improving as expected. Quita is asking for tx order change to Silver Alginate 3x/wk.    Pt also has some redness to markie outer upper buttocks, skin intact. Rx requested for duoderm dressing once weekly and PRN.    Verbal orders given by this writer. Quita was asked to keep this office updated on condition.    Quita can be reached at [phone] but no call back is needed at this time.

## 2024-07-31 ENCOUNTER — TELEPHONE (OUTPATIENT)
Dept: INTERNAL MEDICINE CLINIC | Age: 69
End: 2024-07-31

## 2024-07-31 PROBLEM — E11.65 TYPE 2 DIABETES MELLITUS WITH HYPERGLYCEMIA, WITH LONG-TERM CURRENT USE OF INSULIN (HCC): Status: ACTIVE | Noted: 2024-07-08

## 2024-07-31 PROBLEM — Z79.4 TYPE 2 DIABETES MELLITUS WITH HYPERGLYCEMIA, WITH LONG-TERM CURRENT USE OF INSULIN (HCC): Status: ACTIVE | Noted: 2024-07-08

## 2024-07-31 ASSESSMENT — ENCOUNTER SYMPTOMS
GASTROINTESTINAL NEGATIVE: 1
RESPIRATORY NEGATIVE: 1

## 2024-07-31 NOTE — PROGRESS NOTES
Cardiology was consulted recommended catheterization due to patient's history of CAD with stents.  However by this time patient's mental status had improved and he adamantly refused procedural intervention.  Patient was put on heparin drip for 48 hours.  Later on admission patient became more amenable to intervention in the future, but still refused to have cardiac catheterization during this admission.  Patient will follow-up with cardiology as outpatient  Chronic heart failure with reduced ejection fraction: Suspected ischemic.  Patient initially refused, but later agreed to echocardiogram which showed reduced ejection fraction of 35 to 40% with global hypokinesis, G2 DD.  Patient already was on SGLT2 I for diabetes and low-dose beta-blocker was initiated.  ACE/ARB/ARNI and MRA were contraindicated due to low blood pressures.  Patient will follow-up with cardiology as noted above, potential initiation of ACE/ARB/ARNI/MRA should blood pressure tolerate  Cellulitis of left foot associated with diabetes: Noted on admission with maggots.  Wounds were cleaned.  Wound culture positive for strep and Klebsiella.  Patient was treated with IV antibiotics while admitted and discharged oral antibiotics to complete course  Generalized weakness: Patient was recommended SNF at discharge however he refused this.  As such was discharged home with home health care  Goals of care: Patient was refusing a significant amount of interventions.  Palliative care was consulted patient was changed to a DNR per their discussions      Current status: Overall, patient feels he is doing fair.  He reports his diabetes is doing quite a bit better.  He is monitoring his sugars.  He is eating better.  He is taking his diabetic regimen.  He has buttock pain from sitting in a wheelchair.    Review of Systems:  Review of Systems   Constitutional:  Positive for fatigue. Negative for chills and fever.   Respiratory: Negative.     Cardiovascular:

## 2024-07-31 NOTE — TELEPHONE ENCOUNTER
Quita from [facility] calling in. She saw pt today for wound care to left 2nd toe. She noted maggots to the area. She cleansed the area thoroughly and discovered 2 new areas: stage 2 or 3 to 3rd toe and an unstageable area with slough to top of foot.     She spoke to pt about going to the wound clinic but he does not have transportation. Quita is asking for referral to [facility] which providers can come out to the home and can perform all treatments like in a wound clinic. That phone number is [phone], no fax number given.         She is asking for tx order for Medihoney 3x/wk to areas until pt can be seen.    Quita can be reached at [phone].

## 2024-08-01 DIAGNOSIS — B87.9 MAGGOT INFESTATION: ICD-10-CM

## 2024-08-01 DIAGNOSIS — L03.119 CELLULITIS OF FOOT: Primary | ICD-10-CM

## 2024-08-01 DIAGNOSIS — T07.XXXA MULTIPLE OPEN WOUNDS: ICD-10-CM

## 2024-08-06 ENCOUNTER — CARE COORDINATION (OUTPATIENT)
Dept: CARE COORDINATION | Age: 69
End: 2024-08-06

## 2024-08-07 NOTE — CARE COORDINATION
ACM outreached [facility]; confirmed patients co-pay for 1 month supply of Dexcom sensors is $74.90. information relayed to patient who states he will have to check his bank account balance.   
[facility]. Quita states she did confirm the referral was received but doesn't know if they have contacted the patient. Quita states this is her first time referring to [facility] but has received positive feed back from other users. Quita states she will call [facility] on Wednesday to follow up. Quita states Don's wounds are improving but not close to being healed. Quita states she noted 3 maggots in his wound bed last week and stressed the importance of keeping  wound covered at all times with patient. Quita states she understands patient cannot bend over to reapply a dry dressing if it/ falls off, but patient has been reminded multiple times that he can contact the on call service 24/7. Quita is aware that patient is considering rescheduling his upcoming cardiology appointment on 8/12/24 and will follow up on this with patient.   
the waiver program on July 30th and was told it could take up to 30 days to receive a response. Patient states, \"knowing my luck it will take the full 30 days.\" AC offered to look into [facility] for his upcoming cardiology appointment but patient then accused ACM of not listening to him and stated his issue is with insurance, not transportation. Patient has been in contact with his CM through [employer] regarding his waiver authorization.     Patient continues to decline recommendations from ACM.    Offered patient enrollment in the Remote Patient Monitoring (RPM) program for in-home monitoring: Yes, but did not enroll at this time: declined to enroll in the program because  .     Assessments Completed:   General Assessment    Do you have any symptoms that are causing concern?: No          Medications Reviewed:   Partially completed. Patient taking novolog 1 unit per 8 grams of carbs with meals.   Basaglar 20 units in the morning  Metformin 2000 mg daily  Patient states [facility] nurse is assisting with medication managment    Advance Care Planning:   Not reviewed during this call     Care Planning:   Education Documentation  No documentation found.  Education Comments  No comments found.         PCP/Specialist follow up:   Future Appointments         Provider Specialty Dept Phone    8/12/2024 1:30 PM Braxton Zepeda MD Cardiology [phone]    10/24/2024 4:20 PM Omid Barry, APRN - [facility] Internal Medicine [phone]            Follow Up:   Plan for next [facility] outreach in approximately 1 week to complete:  - diabetes .   -cardiology appt  -consider discharge if patient remains resistant  Patient  is agreeable to this plan.

## 2024-08-14 ENCOUNTER — CARE COORDINATION (OUTPATIENT)
Dept: CARE COORDINATION | Age: 69
End: 2024-08-14

## 2024-08-14 NOTE — CARE COORDINATION
Ambulatory Care Coordination Note     8/14/2024 2:08 PM     Patient outreach attempt by this ACM today to perform care management follow up . ACM was unable to reach the patient by telephone today; left voice message requesting a return phone call to this ACM.     ACM: Sandi Menchaca RN     Care Summary Note:     PCP/Specialist follow up:   Future Appointments         Provider Specialty Dept Phone    10/24/2024 4:20 PM Omid Barry, APRN - [facility] Internal Medicine [phone]            Follow Up:   Plan for next ACM outreach in approximately 1 week to complete:    - diabetes .   -cardiology appt  -HHC/wound  -insurance coverage  -consider discharge if patient remains resistant

## 2024-08-21 ENCOUNTER — CARE COORDINATION (OUTPATIENT)
Dept: CARE COORDINATION | Age: 69
End: 2024-08-21

## 2024-08-21 NOTE — CARE COORDINATION
Ambulatory Care Coordination Note     8/21/2024 3:00 PM     Patient outreach attempt by this ACM today to perform care management follow up . ACM was unable to reach the patient by telephone today; left voice message requesting a return phone call to this ACM.     ACM: Sandi Menchaca RN     Care Summary Note:     - diabetes .   -cardiology appt  -HHC/wound  -insurance coverage  -consider discharge if patient remains resistant    PCP/Specialist follow up:   Future Appointments         Provider Specialty Dept Phone    10/24/2024 4:20 PM Omid Barry, APRN - [facility] Internal Medicine [phone]            Follow Up:   Plan for next ACM outreach in approximately 1 week to complete:  - diabetes .   -cardiology appt  -HHC/wound  -insurance coverage  -consider discharge if patient remains resistant.

## 2024-08-23 ENCOUNTER — TELEPHONE (OUTPATIENT)
Dept: INTERNAL MEDICINE CLINIC | Age: 69
End: 2024-08-23

## 2024-08-23 NOTE — TELEPHONE ENCOUNTER
[facility] order changes. New orders requested to wounds to left 2nd and 5th toes for Aliginate wound dressing and cover with dry dressing 3x/wk and to cont with Medihoney 3x/wk to wound to outer left foot.    Referral was made for in house wound care d/t worsening wounds with maggots (see encounter dated 7/31/24) but pts insurance was not accepted. Quita stated her agency wound nurse did an assessment via a virtual call and made the above recommendations. Wounds are better and free from maggots at this time.    Verbal ordes given to Quita by this wrtier. She was asked to please keep us updated. No call back needed to Quita but she can be reached at [phone].

## 2024-08-29 ENCOUNTER — CARE COORDINATION (OUTPATIENT)
Dept: CARE COORDINATION | Age: 69
End: 2024-08-29

## 2024-08-29 NOTE — CARE COORDINATION
Ambulatory Care Coordination Note     8/29/2024 3:39 PM     Patient outreach attempt by this ACM today to perform care management follow up . ACM was unable to reach the patient by telephone today; left voice message requesting a return phone call to this ACM.     ACM: Sandi Menchaca RN     Care Summary Note:   - diabetes .   -cardiology appt  -HHC/wound  -insurance coverage  -consider discharge if patient remains resistant      PCP/Specialist follow up:   Future Appointments         Provider Specialty Dept Phone    10/24/2024 4:20 PM Omid Barry, APRN - [facility] Internal Medicine [phone]            Follow Up:   Plan for next ACM outreach in approximately 2 weeks to complete:  - diabetes .   -cardiology appt  -HHC/wound  -insurance coverage  -consider discharge if patient remains resistant.

## 2024-09-09 ENCOUNTER — TELEPHONE (OUTPATIENT)
Dept: INTERNAL MEDICINE CLINIC | Age: 69
End: 2024-09-09

## 2024-09-12 ENCOUNTER — CARE COORDINATION (OUTPATIENT)
Dept: CARE COORDINATION | Age: 69
End: 2024-09-12

## 2024-09-17 ENCOUNTER — TELEPHONE (OUTPATIENT)
Dept: INTERNAL MEDICINE CLINIC | Age: 69
End: 2024-09-17

## 2024-09-19 ENCOUNTER — CARE COORDINATION (OUTPATIENT)
Dept: CARE COORDINATION | Age: 69
End: 2024-09-19

## 2024-09-22 LAB
BACTERIA SPEC AEROBE CULT: ABNORMAL
BACTERIA SPEC ANAEROBE CULT: ABNORMAL
BACTERIA SPEC ANAEROBE CULT: ABNORMAL
GRAM STN SPEC: ABNORMAL
GRAM STN SPEC: ABNORMAL
ORGANISM: ABNORMAL

## 2024-09-24 DIAGNOSIS — L08.9 TOE INFECTION: Primary | ICD-10-CM

## 2024-09-24 RX ORDER — SULFAMETHOXAZOLE/TRIMETHOPRIM 800-160 MG
1 TABLET ORAL 2 TIMES DAILY
Qty: 20 TABLET | Refills: 0 | Status: SHIPPED | OUTPATIENT
Start: 2024-09-24 | End: 2024-10-04

## 2024-09-26 ENCOUNTER — TELEPHONE (OUTPATIENT)
Dept: INTERNAL MEDICINE CLINIC | Age: 69
End: 2024-09-26

## 2024-09-27 ENCOUNTER — TELEPHONE (OUTPATIENT)
Dept: INTERNAL MEDICINE CLINIC | Age: 69
End: 2024-09-27

## 2024-10-17 ENCOUNTER — TELEPHONE (OUTPATIENT)
Dept: INTERNAL MEDICINE CLINIC | Age: 69
End: 2024-10-17

## 2024-10-17 ENCOUNTER — TELEPHONE (OUTPATIENT)
Dept: PHARMACY | Facility: CLINIC | Age: 69
End: 2024-10-17

## 2024-10-17 NOTE — TELEPHONE ENCOUNTER
CLINICAL PHARMACY NOTE - [facility] Pharmacy Referral    Patient can be scheduled with:  Florin Stephen (Andy)-  [facility] Providers: Deedee Holly, Rashi Frederick, Aleena Toth, Hortencia Chao, Omid Barry    Received a referral from: Provider:    to discuss patient’s medications. Called patient to schedule a time to speak with a pharmacist over the telephone.   Spoke to patient and advised them of the above message.  Patient verified understanding and scheduled their appointment: tomorrow at 2pm      Destiny Gray CPhT.   [facility] Clinical   [facility] Clinical [facility]acy  Toll free: [phone] Option 1    For Pharmacy Admin Tracking Only    Program: [employer]  CPA in place:  No  Recommendation Provided To: Patient/Caregiver: 1 via Telephone  Intervention Detail: Scheduled Appointment  Intervention Accepted By: Patient/Caregiver: 1  Gap Closed?: Yes   Time Spent (min): 10

## 2024-10-17 NOTE — TELEPHONE ENCOUNTER
Sue with [facility] calling. States pt is only taking short & long acting insulins- is not taking any other medications. Pt believes metformin 500 MG 4 x a day was making him nauseas, is not taking baby aspirin either. Sue did ask pt if he would consider restarting metformin only to see if it upsets his stomach or if it is something else- pt did say he would consider. Sue wanted to report this to Omid, please advise.

## 2024-10-18 ENCOUNTER — TELEPHONE (OUTPATIENT)
Dept: PHARMACY | Facility: CLINIC | Age: 69
End: 2024-10-18

## 2024-10-18 NOTE — TELEPHONE ENCOUNTER
Looping in pharmacist Rafy Stephen who has a visit with patient today.    Rafy,     I don't have a lot of information in this message.  If he is not tolerating metformin but is taking basal/bolus insulin and FSSB is ~200, I'd be comfortable with him increasing lantus to 22 or even 24 units if no hypoglycemia and based on your judgement after discussing with patient.    We could consider Metformin XR for (possibly) better tolerance but this maybe cost prohibitive for Don.

## 2024-10-18 NOTE — TELEPHONE ENCOUNTER
CLINICAL PHARMACY NOTE - Medication Review    Don Tee is a 68 y.o. male referred to a clinical pharmacy specialist due to uncontrolled DM    Reached out to patient to discuss medication.  When I asked about what he as currently taking he stated several times that he is taking \"absolutely nothing\".   I asked about his insulin and he did say he takes it \"occasionally\" but not often at all.  He then proceeded to say he does not know what they are doing to him so he is not going to take any of it.  I offered again to go through each medication in more detail and he said that he knows what they are all for.  He then told me it was a bad time to talk because wound care was there.  Reminded him that he agreed to speak with us at this time and offered to reach out again next week and he agreed.      Will try to reach patient again next week to complete or re-schedule med review.    Thank you,  MYRIAM Stephen, PharmD, [facility]  Ambulatory Care Clinical Pharmacist- [facility] Clinical Pharmacy  Department, toll free: [phone]

## 2024-10-21 ENCOUNTER — TELEPHONE (OUTPATIENT)
Dept: INTERNAL MEDICINE CLINIC | Age: 69
End: 2024-10-21

## 2024-10-21 NOTE — TELEPHONE ENCOUNTER
Sue with [facility] called stating pt has a stage 2 on his sacrum , she would need the okay to use collagen twice a week with a dressing over it and as needed . You can contact her at [phone] if there are any questions.

## 2024-10-28 ENCOUNTER — TELEPHONE (OUTPATIENT)
Dept: INTERNAL MEDICINE CLINIC | Age: 69
End: 2024-10-28

## 2024-10-31 NOTE — TELEPHONE ENCOUNTER
Have been unable to reach pt.  Extremely uncooperative when I did speak with him briefly last week. Will sign off.    MYRIAM Stephen, PharmD, BCACP  Ambulatory Care Clinical Pharmacist- [facility] Clinical Pharmacy  Department, toll free: [phone]    For Pharmacy Admin Tracking Only    Program: [employer]  CPA in place:  No  Time Spent (min): 20

## 2024-11-01 ENCOUNTER — HOSPITAL ENCOUNTER (INPATIENT)
Age: 69
LOS: 6 days | Discharge: HOSPICE | DRG: 871 | End: 2024-11-07
Attending: STUDENT IN AN ORGANIZED HEALTH CARE EDUCATION/TRAINING PROGRAM | Admitting: INTERNAL MEDICINE
Payer: MEDICARE

## 2024-11-01 ENCOUNTER — APPOINTMENT (OUTPATIENT)
Dept: CT IMAGING | Age: 69
DRG: 871 | End: 2024-11-01
Payer: MEDICARE

## 2024-11-01 ENCOUNTER — APPOINTMENT (OUTPATIENT)
Dept: GENERAL RADIOLOGY | Age: 69
DRG: 871 | End: 2024-11-01
Payer: MEDICARE

## 2024-11-01 ENCOUNTER — TELEPHONE (OUTPATIENT)
Dept: INTERNAL MEDICINE CLINIC | Age: 69
End: 2024-11-01

## 2024-11-01 DIAGNOSIS — J90 PLEURAL EFFUSION: ICD-10-CM

## 2024-11-01 DIAGNOSIS — R74.01 ELEVATED LIVER TRANSAMINASE LEVEL: ICD-10-CM

## 2024-11-01 DIAGNOSIS — I82.90 VTE (VENOUS THROMBOEMBOLISM): ICD-10-CM

## 2024-11-01 DIAGNOSIS — I26.09 ACUTE PULMONARY EMBOLISM WITH ACUTE COR PULMONALE, UNSPECIFIED PULMONARY EMBOLISM TYPE (HCC): ICD-10-CM

## 2024-11-01 DIAGNOSIS — E11.10 DIABETIC KETOACIDOSIS WITHOUT COMA ASSOCIATED WITH TYPE 2 DIABETES MELLITUS (HCC): Primary | ICD-10-CM

## 2024-11-01 DIAGNOSIS — I48.91 ATRIAL FIBRILLATION WITH RVR (HCC): ICD-10-CM

## 2024-11-01 DIAGNOSIS — J96.01 ACUTE RESPIRATORY FAILURE WITH HYPOXIA: ICD-10-CM

## 2024-11-01 PROBLEM — K72.00 ACUTE LIVER FAILURE WITHOUT HEPATIC COMA: Status: ACTIVE | Noted: 2024-11-01

## 2024-11-01 PROBLEM — I50.9 ACUTE HEART FAILURE (HCC): Status: ACTIVE | Noted: 2024-11-01

## 2024-11-01 PROBLEM — N17.9 AKI (ACUTE KIDNEY INJURY) (HCC): Status: ACTIVE | Noted: 2024-11-01

## 2024-11-01 LAB
ALBUMIN SERPL-MCNC: 3.6 G/DL (ref 3.4–5)
ALBUMIN/GLOB SERPL: 1.1 {RATIO} (ref 1.1–2.2)
ALP SERPL-CCNC: 542 U/L (ref 40–129)
ALT SERPL-CCNC: 2356 U/L (ref 10–40)
ANION GAP SERPL CALCULATED.3IONS-SCNC: 21 MMOL/L (ref 3–16)
ANTI-XA UNFRAC HEPARIN: <0.1 IU/ML (ref 0.3–0.7)
APAP SERPL-MCNC: <5 UG/ML (ref 10–30)
AST SERPL-CCNC: 1795 U/L (ref 15–37)
BACTERIA URNS QL MICRO: NORMAL /HPF
BASE EXCESS BLDV CALC-SCNC: -7.6 MMOL/L
BASOPHILS # BLD: 0 K/UL (ref 0–0.2)
BASOPHILS NFR BLD: 0.2 %
BETA-HYDROXYBUTYRATE: 1.19 MMOL/L (ref 0–0.27)
BILIRUB SERPL-MCNC: 1.4 MG/DL (ref 0–1)
BILIRUB UR QL STRIP.AUTO: NEGATIVE
BUN SERPL-MCNC: 65 MG/DL (ref 7–20)
CALCIUM SERPL-MCNC: 9.7 MG/DL (ref 8.3–10.6)
CHLORIDE SERPL-SCNC: 93 MMOL/L (ref 99–110)
CK SERPL-CCNC: 96 U/L (ref 39–308)
CLARITY UR: CLEAR
CO2 BLDV-SCNC: 20 MMOL/L
CO2 SERPL-SCNC: 16 MMOL/L (ref 21–32)
COHGB MFR BLDV: 1.5 %
COLOR UR: YELLOW
CREAT SERPL-MCNC: 1.4 MG/DL (ref 0.8–1.3)
DEPRECATED RDW RBC AUTO: 16.5 % (ref 12.4–15.4)
EOSINOPHIL # BLD: 0 K/UL (ref 0–0.6)
EOSINOPHIL NFR BLD: 0.1 %
EPI CELLS #/AREA URNS HPF: NORMAL /HPF (ref 0–5)
ETHANOLAMINE SERPL-MCNC: NORMAL MG/DL (ref 0–0.08)
FINE GRAN CASTS #/AREA URNS HPF: NORMAL /LPF (ref 0–2)
FLUAV RNA UPPER RESP QL NAA+PROBE: NEGATIVE
FLUBV AG NPH QL: NEGATIVE
GFR SERPLBLD CREATININE-BSD FMLA CKD-EPI: 54 ML/MIN/{1.73_M2}
GLUCOSE BLD-MCNC: 389 MG/DL (ref 70–99)
GLUCOSE BLD-MCNC: 405 MG/DL (ref 70–99)
GLUCOSE BLD-MCNC: 495 MG/DL (ref 70–99)
GLUCOSE BLD-MCNC: 525 MG/DL (ref 70–99)
GLUCOSE BLD-MCNC: 578 MG/DL (ref 70–99)
GLUCOSE FLD-MCNC: 598 MG/DL
GLUCOSE SERPL-MCNC: 608 MG/DL (ref 70–99)
GLUCOSE UR STRIP.AUTO-MCNC: >=1000 MG/DL
HAV IGM SERPL QL IA: NORMAL
HBV CORE IGM SERPL QL IA: NORMAL
HBV SURFACE AG SERPL QL IA: NORMAL
HCO3 BLDV-SCNC: 19 MMOL/L (ref 23–29)
HCT VFR BLD AUTO: 46 % (ref 40.5–52.5)
HCV AB SERPL QL IA: NORMAL
HGB BLD-MCNC: 14.5 G/DL (ref 13.5–17.5)
HGB UR QL STRIP.AUTO: NEGATIVE
HYALINE CASTS #/AREA URNS LPF: NORMAL /LPF (ref 0–2)
INR PPP: 2.62 (ref 0.85–1.15)
KETONES UR STRIP.AUTO-MCNC: ABNORMAL MG/DL
LACTATE BLDV-SCNC: 5 MMOL/L (ref 0.4–2)
LDH FLD L TO P-CCNC: 581 U/L
LEUKOCYTE ESTERASE UR QL STRIP.AUTO: NEGATIVE
LIPASE SERPL-CCNC: 13 U/L (ref 13–60)
LIPASE SERPL-CCNC: 18 U/L (ref 13–60)
LYMPHOCYTES # BLD: 0.5 K/UL (ref 1–5.1)
LYMPHOCYTES NFR BLD: 4.5 %
MCH RBC QN AUTO: 31.2 PG (ref 26–34)
MCHC RBC AUTO-ENTMCNC: 31.6 G/DL (ref 31–36)
MCV RBC AUTO: 98.9 FL (ref 80–100)
METHGB MFR BLDV: 0.6 %
MONOCYTES # BLD: 0.9 K/UL (ref 0–1.3)
MONOCYTES NFR BLD: 8.2 %
NEUTROPHILS # BLD: 9.1 K/UL (ref 1.7–7.7)
NEUTROPHILS NFR BLD: 87 %
NITRITE UR QL STRIP.AUTO: NEGATIVE
NT-PROBNP SERPL-MCNC: ABNORMAL PG/ML (ref 0–124)
O2 THERAPY: ABNORMAL
PCO2 BLDV: 42.3 MMHG (ref 40–50)
PERFORMED ON: ABNORMAL
PH BLDV: 7.26 [PH] (ref 7.35–7.45)
PH FLD STRIP: 8 [PH]
PH UR STRIP.AUTO: 5 [PH] (ref 5–8)
PLATELET # BLD AUTO: 194 K/UL (ref 135–450)
PMV BLD AUTO: 9.1 FL (ref 5–10.5)
PO2 BLDV: <30 MMHG
POTASSIUM SERPL-SCNC: 6.8 MMOL/L (ref 3.5–5.1)
PROCALCITONIN SERPL IA-MCNC: 0.63 NG/ML (ref 0–0.15)
PROT FLD-MCNC: 3.1 G/DL
PROT SERPL-MCNC: 7 G/DL (ref 6.4–8.2)
PROT UR STRIP.AUTO-MCNC: 30 MG/DL
PROTHROMBIN TIME: 28 SEC (ref 11.9–14.9)
RBC # BLD AUTO: 4.65 M/UL (ref 4.2–5.9)
RBC #/AREA URNS HPF: NORMAL /HPF (ref 0–4)
SAO2 % BLDV: 27 %
SARS-COV-2 RDRP RESP QL NAA+PROBE: NOT DETECTED
SODIUM SERPL-SCNC: 130 MMOL/L (ref 136–145)
SP GR UR STRIP.AUTO: 1.03 (ref 1–1.03)
SPECIMEN SOURCE FLD: NORMAL
TROPONIN, HIGH SENSITIVITY: 111 NG/L (ref 0–22)
TROPONIN, HIGH SENSITIVITY: 112 NG/L (ref 0–22)
UA COMPLETE W REFLEX CULTURE PNL UR: ABNORMAL
UA DIPSTICK W REFLEX MICRO PNL UR: YES
URN SPEC COLLECT METH UR: ABNORMAL
UROBILINOGEN UR STRIP-ACNC: 1 E.U./DL
WBC # BLD AUTO: 10.4 K/UL (ref 4–11)

## 2024-11-01 PROCEDURE — 82945 GLUCOSE OTHER FLUID: CPT

## 2024-11-01 PROCEDURE — 83036 HEMOGLOBIN GLYCOSYLATED A1C: CPT

## 2024-11-01 PROCEDURE — 93005 ELECTROCARDIOGRAM TRACING: CPT | Performed by: INTERNAL MEDICINE

## 2024-11-01 PROCEDURE — 83930 ASSAY OF BLOOD OSMOLALITY: CPT

## 2024-11-01 PROCEDURE — 0W9B30Z DRAINAGE OF LEFT PLEURAL CAVITY WITH DRAINAGE DEVICE, PERCUTANEOUS APPROACH: ICD-10-PCS | Performed by: STUDENT IN AN ORGANIZED HEALTH CARE EDUCATION/TRAINING PROGRAM

## 2024-11-01 PROCEDURE — 99291 CRITICAL CARE FIRST HOUR: CPT

## 2024-11-01 PROCEDURE — 80143 DRUG ASSAY ACETAMINOPHEN: CPT

## 2024-11-01 PROCEDURE — 87116 MYCOBACTERIA CULTURE: CPT

## 2024-11-01 PROCEDURE — 83986 ASSAY PH BODY FLUID NOS: CPT

## 2024-11-01 PROCEDURE — 87804 INFLUENZA ASSAY W/OPTIC: CPT

## 2024-11-01 PROCEDURE — 87070 CULTURE OTHR SPECIMN AEROBIC: CPT

## 2024-11-01 PROCEDURE — 6360000002 HC RX W HCPCS: Performed by: STUDENT IN AN ORGANIZED HEALTH CARE EDUCATION/TRAINING PROGRAM

## 2024-11-01 PROCEDURE — 36415 COLL VENOUS BLD VENIPUNCTURE: CPT

## 2024-11-01 PROCEDURE — 84145 PROCALCITONIN (PCT): CPT

## 2024-11-01 PROCEDURE — 85520 HEPARIN ASSAY: CPT

## 2024-11-01 PROCEDURE — 87205 SMEAR GRAM STAIN: CPT

## 2024-11-01 PROCEDURE — 83605 ASSAY OF LACTIC ACID: CPT

## 2024-11-01 PROCEDURE — 5A0945A ASSISTANCE WITH RESPIRATORY VENTILATION, 24-96 CONSECUTIVE HOURS, HIGH NASAL FLOW/VELOCITY: ICD-10-PCS | Performed by: HOSPITALIST

## 2024-11-01 PROCEDURE — 96375 TX/PRO/DX INJ NEW DRUG ADDON: CPT

## 2024-11-01 PROCEDURE — 87102 FUNGUS ISOLATION CULTURE: CPT

## 2024-11-01 PROCEDURE — 83615 LACTATE (LD) (LDH) ENZYME: CPT

## 2024-11-01 PROCEDURE — 71260 CT THORAX DX C+: CPT

## 2024-11-01 PROCEDURE — 85025 COMPLETE CBC W/AUTO DIFF WBC: CPT

## 2024-11-01 PROCEDURE — 84484 ASSAY OF TROPONIN QUANT: CPT

## 2024-11-01 PROCEDURE — 84157 ASSAY OF PROTEIN OTHER: CPT

## 2024-11-01 PROCEDURE — 93005 ELECTROCARDIOGRAM TRACING: CPT | Performed by: STUDENT IN AN ORGANIZED HEALTH CARE EDUCATION/TRAINING PROGRAM

## 2024-11-01 PROCEDURE — 80074 ACUTE HEPATITIS PANEL: CPT

## 2024-11-01 PROCEDURE — 80053 COMPREHEN METABOLIC PANEL: CPT

## 2024-11-01 PROCEDURE — 71045 X-RAY EXAM CHEST 1 VIEW: CPT

## 2024-11-01 PROCEDURE — 2500000003 HC RX 250 WO HCPCS: Performed by: INTERNAL MEDICINE

## 2024-11-01 PROCEDURE — 85610 PROTHROMBIN TIME: CPT

## 2024-11-01 PROCEDURE — 87635 SARS-COV-2 COVID-19 AMP PRB: CPT

## 2024-11-01 PROCEDURE — 2580000003 HC RX 258: Performed by: STUDENT IN AN ORGANIZED HEALTH CARE EDUCATION/TRAINING PROGRAM

## 2024-11-01 PROCEDURE — 2000000000 HC ICU R&B

## 2024-11-01 PROCEDURE — 82010 KETONE BODYS QUAN: CPT

## 2024-11-01 PROCEDURE — 6360000004 HC RX CONTRAST MEDICATION: Performed by: STUDENT IN AN ORGANIZED HEALTH CARE EDUCATION/TRAINING PROGRAM

## 2024-11-01 PROCEDURE — 89050 BODY FLUID CELL COUNT: CPT

## 2024-11-01 PROCEDURE — 6370000000 HC RX 637 (ALT 250 FOR IP): Performed by: STUDENT IN AN ORGANIZED HEALTH CARE EDUCATION/TRAINING PROGRAM

## 2024-11-01 PROCEDURE — 2500000003 HC RX 250 WO HCPCS: Performed by: STUDENT IN AN ORGANIZED HEALTH CARE EDUCATION/TRAINING PROGRAM

## 2024-11-01 PROCEDURE — 83880 ASSAY OF NATRIURETIC PEPTIDE: CPT

## 2024-11-01 PROCEDURE — 2580000003 HC RX 258: Performed by: INTERNAL MEDICINE

## 2024-11-01 PROCEDURE — 82550 ASSAY OF CK (CPK): CPT

## 2024-11-01 PROCEDURE — 87206 SMEAR FLUORESCENT/ACID STAI: CPT

## 2024-11-01 PROCEDURE — 32551 INSERTION OF CHEST TUBE: CPT

## 2024-11-01 PROCEDURE — 81001 URINALYSIS AUTO W/SCOPE: CPT

## 2024-11-01 PROCEDURE — 83690 ASSAY OF LIPASE: CPT

## 2024-11-01 PROCEDURE — 96374 THER/PROPH/DIAG INJ IV PUSH: CPT

## 2024-11-01 PROCEDURE — 89051 BODY FLUID CELL COUNT: CPT

## 2024-11-01 PROCEDURE — 82803 BLOOD GASES ANY COMBINATION: CPT

## 2024-11-01 PROCEDURE — 87502 INFLUENZA DNA AMP PROBE: CPT

## 2024-11-01 PROCEDURE — 82077 ASSAY SPEC XCP UR&BREATH IA: CPT

## 2024-11-01 PROCEDURE — 5A2204Z RESTORATION OF CARDIAC RHYTHM, SINGLE: ICD-10-PCS | Performed by: STUDENT IN AN ORGANIZED HEALTH CARE EDUCATION/TRAINING PROGRAM

## 2024-11-01 RX ORDER — HEPARIN SODIUM 1000 [USP'U]/ML
4000 INJECTION, SOLUTION INTRAVENOUS; SUBCUTANEOUS ONCE
Status: COMPLETED | OUTPATIENT
Start: 2024-11-01 | End: 2024-11-01

## 2024-11-01 RX ORDER — 0.9 % SODIUM CHLORIDE 0.9 %
15 INTRAVENOUS SOLUTION INTRAVENOUS ONCE
Status: COMPLETED | OUTPATIENT
Start: 2024-11-01 | End: 2024-11-01

## 2024-11-01 RX ORDER — IOPAMIDOL 755 MG/ML
75 INJECTION, SOLUTION INTRAVASCULAR
Status: COMPLETED | OUTPATIENT
Start: 2024-11-01 | End: 2024-11-01

## 2024-11-01 RX ORDER — SODIUM CHLORIDE 0.9 % (FLUSH) 0.9 %
5-40 SYRINGE (ML) INJECTION PRN
Status: DISCONTINUED | OUTPATIENT
Start: 2024-11-01 | End: 2024-11-07 | Stop reason: HOSPADM

## 2024-11-01 RX ORDER — HEPARIN SODIUM 1000 [USP'U]/ML
80 INJECTION, SOLUTION INTRAVENOUS; SUBCUTANEOUS PRN
Status: DISCONTINUED | OUTPATIENT
Start: 2024-11-01 | End: 2024-11-01

## 2024-11-01 RX ORDER — SODIUM CHLORIDE 9 MG/ML
INJECTION, SOLUTION INTRAVENOUS PRN
Status: DISCONTINUED | OUTPATIENT
Start: 2024-11-01 | End: 2024-11-01

## 2024-11-01 RX ORDER — HEPARIN SODIUM 1000 [USP'U]/ML
40 INJECTION, SOLUTION INTRAVENOUS; SUBCUTANEOUS PRN
Status: DISCONTINUED | OUTPATIENT
Start: 2024-11-01 | End: 2024-11-01

## 2024-11-01 RX ORDER — DEXTROSE MONOHYDRATE AND SODIUM CHLORIDE 5; .45 G/100ML; G/100ML
INJECTION, SOLUTION INTRAVENOUS CONTINUOUS PRN
Status: DISCONTINUED | OUTPATIENT
Start: 2024-11-01 | End: 2024-11-05

## 2024-11-01 RX ORDER — SODIUM CHLORIDE 0.9 % (FLUSH) 0.9 %
5-40 SYRINGE (ML) INJECTION EVERY 12 HOURS SCHEDULED
Status: DISCONTINUED | OUTPATIENT
Start: 2024-11-02 | End: 2024-11-07 | Stop reason: HOSPADM

## 2024-11-01 RX ORDER — METOPROLOL SUCCINATE 25 MG/1
25 TABLET, EXTENDED RELEASE ORAL DAILY
Status: DISCONTINUED | OUTPATIENT
Start: 2024-11-01 | End: 2024-11-07 | Stop reason: HOSPADM

## 2024-11-01 RX ORDER — SODIUM CHLORIDE 9 MG/ML
INJECTION, SOLUTION INTRAVENOUS PRN
Status: DISCONTINUED | OUTPATIENT
Start: 2024-11-01 | End: 2024-11-07 | Stop reason: HOSPADM

## 2024-11-01 RX ORDER — HEPARIN SODIUM 10000 [USP'U]/100ML
0-4000 INJECTION, SOLUTION INTRAVENOUS CONTINUOUS
Status: DISCONTINUED | OUTPATIENT
Start: 2024-11-01 | End: 2024-11-07 | Stop reason: HOSPADM

## 2024-11-01 RX ORDER — POLYETHYLENE GLYCOL 3350 17 G/17G
17 POWDER, FOR SOLUTION ORAL DAILY PRN
Status: DISCONTINUED | OUTPATIENT
Start: 2024-11-01 | End: 2024-11-07 | Stop reason: HOSPADM

## 2024-11-01 RX ORDER — ASPIRIN 81 MG/1
81 TABLET ORAL DAILY
Status: DISCONTINUED | OUTPATIENT
Start: 2024-11-01 | End: 2024-11-07 | Stop reason: HOSPADM

## 2024-11-01 RX ORDER — POTASSIUM CHLORIDE 7.45 MG/ML
10 INJECTION INTRAVENOUS PRN
Status: DISCONTINUED | OUTPATIENT
Start: 2024-11-01 | End: 2024-11-03

## 2024-11-01 RX ORDER — LIDOCAINE HYDROCHLORIDE AND EPINEPHRINE 10; 10 MG/ML; UG/ML
20 INJECTION, SOLUTION INFILTRATION; PERINEURAL ONCE
Status: COMPLETED | OUTPATIENT
Start: 2024-11-01 | End: 2024-11-01

## 2024-11-01 RX ORDER — SODIUM CHLORIDE 9 MG/ML
INJECTION, SOLUTION INTRAVENOUS CONTINUOUS
Status: DISCONTINUED | OUTPATIENT
Start: 2024-11-01 | End: 2024-11-02

## 2024-11-01 RX ORDER — GLUCAGON 1 MG/ML
1 KIT INJECTION PRN
Status: DISCONTINUED | OUTPATIENT
Start: 2024-11-01 | End: 2024-11-02

## 2024-11-01 RX ORDER — SODIUM CHLORIDE 0.9 % (FLUSH) 0.9 %
5-40 SYRINGE (ML) INJECTION EVERY 12 HOURS SCHEDULED
Status: DISCONTINUED | OUTPATIENT
Start: 2024-11-01 | End: 2024-11-02

## 2024-11-01 RX ORDER — MAGNESIUM SULFATE IN WATER 40 MG/ML
2000 INJECTION, SOLUTION INTRAVENOUS PRN
Status: DISCONTINUED | OUTPATIENT
Start: 2024-11-01 | End: 2024-11-07 | Stop reason: HOSPADM

## 2024-11-01 RX ORDER — DEXTROSE MONOHYDRATE 100 MG/ML
INJECTION, SOLUTION INTRAVENOUS CONTINUOUS PRN
Status: DISCONTINUED | OUTPATIENT
Start: 2024-11-01 | End: 2024-11-02

## 2024-11-01 RX ORDER — CALCIUM GLUCONATE 20 MG/ML
1000 INJECTION, SOLUTION INTRAVENOUS ONCE
Status: COMPLETED | OUTPATIENT
Start: 2024-11-01 | End: 2024-11-01

## 2024-11-01 RX ADMIN — CALCIUM GLUCONATE 1000 MG: 20 INJECTION, SOLUTION INTRAVENOUS at 18:29

## 2024-11-01 RX ADMIN — INSULIN HUMAN 10.4 UNITS/HR: 1 INJECTION, SOLUTION INTRAVENOUS at 18:53

## 2024-11-01 RX ADMIN — AMIODARONE HYDROCHLORIDE 150 MG: 1.5 INJECTION, SOLUTION INTRAVENOUS at 23:55

## 2024-11-01 RX ADMIN — AMIODARONE HYDROCHLORIDE 1 MG/MIN: 1.8 INJECTION, SOLUTION INTRAVENOUS at 23:48

## 2024-11-01 RX ADMIN — HEPARIN SODIUM 910 UNITS/HR: 10000 INJECTION, SOLUTION INTRAVENOUS at 20:37

## 2024-11-01 RX ADMIN — SODIUM CHLORIDE 1136 ML: 9 INJECTION, SOLUTION INTRAVENOUS at 22:19

## 2024-11-01 RX ADMIN — HEPARIN SODIUM 4000 UNITS: 1000 INJECTION INTRAVENOUS; SUBCUTANEOUS at 20:34

## 2024-11-01 RX ADMIN — SODIUM BICARBONATE 50 MEQ: 84 INJECTION INTRAVENOUS at 18:42

## 2024-11-01 RX ADMIN — SODIUM ZIRCONIUM CYCLOSILICATE 10 G: 10 POWDER, FOR SUSPENSION ORAL at 18:31

## 2024-11-01 RX ADMIN — LIDOCAINE HYDROCHLORIDE,EPINEPHRINE BITARTRATE 20 ML: 10; .01 INJECTION, SOLUTION INFILTRATION; PERINEURAL at 19:15

## 2024-11-01 RX ADMIN — IOPAMIDOL 75 ML: 755 INJECTION, SOLUTION INTRAVENOUS at 17:28

## 2024-11-01 RX ADMIN — SODIUM CHLORIDE, PRESERVATIVE FREE 10 ML: 5 INJECTION INTRAVENOUS at 22:25

## 2024-11-01 ASSESSMENT — PAIN DESCRIPTION - PAIN TYPE: TYPE: ACUTE PAIN

## 2024-11-01 ASSESSMENT — LIFESTYLE VARIABLES
HOW MANY STANDARD DRINKS CONTAINING ALCOHOL DO YOU HAVE ON A TYPICAL DAY: PATIENT DOES NOT DRINK
HOW OFTEN DO YOU HAVE A DRINK CONTAINING ALCOHOL: NEVER

## 2024-11-01 ASSESSMENT — PAIN DESCRIPTION - ONSET: ONSET: GRADUAL

## 2024-11-01 ASSESSMENT — PAIN DESCRIPTION - ORIENTATION: ORIENTATION: LEFT

## 2024-11-01 ASSESSMENT — PAIN SCALES - GENERAL
PAINLEVEL_OUTOF10: 0
PAINLEVEL_OUTOF10: 0
PAINLEVEL_OUTOF10: 2

## 2024-11-01 ASSESSMENT — PAIN DESCRIPTION - DESCRIPTORS: DESCRIPTORS: ACHING

## 2024-11-01 ASSESSMENT — PAIN DESCRIPTION - LOCATION: LOCATION: CHEST

## 2024-11-01 ASSESSMENT — PAIN - FUNCTIONAL ASSESSMENT: PAIN_FUNCTIONAL_ASSESSMENT: ACTIVITIES ARE NOT PREVENTED

## 2024-11-01 ASSESSMENT — PAIN DESCRIPTION - FREQUENCY: FREQUENCY: INTERMITTENT

## 2024-11-01 NOTE — ED NOTES
Pt to ED via [facility] d/t sob and hyperglycemia. Per EMS report, pt has a home health nurse that performs dressing changes to left foot. Home health nurse called 911 d/t pt blood glucose 460 along with pt c/o sob and low o2 sats. When EMS arrived, o2 sats 70% on ra. EMS placed pt on non-rebreather mask with o2 sats increasing to 92%. Upon ED arrival, pt alert and oriented, tachypneic, tachycardic with hr 108. Transferred from non-rebreather mask to high flow nasal cannula at 11L with o2 sats 94%. ED MD Julissa at bedside.

## 2024-11-01 NOTE — ED NOTES
Urine specimen requested from pt.  Pt states she is not able to urinate at this time, aware to notify RN when able to provide specimen.

## 2024-11-01 NOTE — ED PROVIDER NOTES
11/01/2024 07:39:26 PM         Condition on disposition: Stable    OUTPATIENT FOLLOW UP THE PATIENT:  No follow-up provider specified.      Electronically Signed: Khris Costa MD, 11/01/24, 7:42 PM    This report has been produced using speech recognition software and may contain errors related to that system including errors in grammar, punctuation, and spelling, as well as words and phrases that may be inappropriate. If there are any questions or concerns please feel free to contact the dictating provider for clarification.       Khris Costa MD  11/01/24 9450       Khris Costa MD  11/01/24 7575

## 2024-11-01 NOTE — TELEPHONE ENCOUNTER
Carolyne from [facility] is calling when she got to pt home today his oxygen was 71 he was sob-and was complaining of heart burn so she called the squad and they took him to [facility]----Thanks. If any questions she can be reached at [phone].  Thanks.

## 2024-11-02 ENCOUNTER — APPOINTMENT (OUTPATIENT)
Dept: CT IMAGING | Age: 69
DRG: 871 | End: 2024-11-02
Payer: MEDICARE

## 2024-11-02 ENCOUNTER — APPOINTMENT (OUTPATIENT)
Age: 69
DRG: 871 | End: 2024-11-02
Attending: INTERNAL MEDICINE
Payer: MEDICARE

## 2024-11-02 PROBLEM — R65.20 SEVERE SEPSIS WITH ACUTE ORGAN DYSFUNCTION (HCC): Status: ACTIVE | Noted: 2024-11-02

## 2024-11-02 PROBLEM — A41.9 SEVERE SEPSIS WITH ACUTE ORGAN DYSFUNCTION (HCC): Status: ACTIVE | Noted: 2024-11-02

## 2024-11-02 PROBLEM — I48.91 RAPID ATRIAL FIBRILLATION (HCC): Status: ACTIVE | Noted: 2024-11-02

## 2024-11-02 LAB
ACID FAST STN SPEC QL: NORMAL
ALBUMIN SERPL-MCNC: 2.9 G/DL (ref 3.4–5)
ALP SERPL-CCNC: 396 U/L (ref 40–129)
ALT SERPL-CCNC: 1848 U/L (ref 10–40)
ANION GAP SERPL CALCULATED.3IONS-SCNC: 11 MMOL/L (ref 3–16)
ANION GAP SERPL CALCULATED.3IONS-SCNC: 16 MMOL/L (ref 3–16)
ANTI-XA UNFRAC HEPARIN: 0.22 IU/ML (ref 0.3–0.7)
ANTI-XA UNFRAC HEPARIN: <0.1 IU/ML (ref 0.3–0.7)
ANTI-XA UNFRAC HEPARIN: <0.1 IU/ML (ref 0.3–0.7)
ANTI-XA UNFRAC HEPARIN: >1.1 IU/ML (ref 0.3–0.7)
APPEARANCE FLUID: CLEAR
AST SERPL-CCNC: 1238 U/L (ref 15–37)
BASOPHILS # BLD: 0 K/UL (ref 0–0.2)
BASOPHILS NFR BLD: 0.3 %
BDY FLUID QUALITY: NORMAL
BILIRUB DIRECT SERPL-MCNC: 0.4 MG/DL (ref 0–0.3)
BILIRUB INDIRECT SERPL-MCNC: 0.2 MG/DL (ref 0–1)
BILIRUB SERPL-MCNC: 0.6 MG/DL (ref 0–1)
BUN SERPL-MCNC: 62 MG/DL (ref 7–20)
BUN SERPL-MCNC: 64 MG/DL (ref 7–20)
CALCIUM SERPL-MCNC: 8.6 MG/DL (ref 8.3–10.6)
CALCIUM SERPL-MCNC: 9.2 MG/DL (ref 8.3–10.6)
CELL COUNT FLUID TYPE: NORMAL
CHLORIDE SERPL-SCNC: 100 MMOL/L (ref 99–110)
CHLORIDE SERPL-SCNC: 101 MMOL/L (ref 99–110)
CO2 SERPL-SCNC: 19 MMOL/L (ref 21–32)
CO2 SERPL-SCNC: 24 MMOL/L (ref 21–32)
COLOR FLUID: YELLOW
CREAT SERPL-MCNC: 1 MG/DL (ref 0.8–1.3)
CREAT SERPL-MCNC: 1.2 MG/DL (ref 0.8–1.3)
DEPRECATED RDW RBC AUTO: 15.8 % (ref 12.4–15.4)
ECHO AO ROOT DIAM: 3.6 CM
ECHO AO ROOT INDEX: 1.95 CM/M2
ECHO BSA: 1.82 M2
ECHO LA AREA 4C: 14.6 CM2
ECHO LA DIAMETER INDEX: 1.51 CM/M2
ECHO LA DIAMETER: 2.8 CM
ECHO LA MAJOR AXIS: 5.4 CM
ECHO LA TO AORTIC ROOT RATIO: 0.78
ECHO LA VOL MOD A4C: 30 ML (ref 18–58)
ECHO LA VOLUME INDEX MOD A4C: 16 ML/M2 (ref 16–34)
ECHO LV EDV A4C: 115 ML
ECHO LV EDV INDEX A4C: 62 ML/M2
ECHO LV EF PHYSICIAN: 10 %
ECHO LV EJECTION FRACTION A4C: 14 %
ECHO LV ESV A4C: 99 ML
ECHO LV ESV INDEX A4C: 54 ML/M2
ECHO LV FRACTIONAL SHORTENING: 4 % (ref 28–44)
ECHO LV INTERNAL DIMENSION DIASTOLE INDEX: 3.03 CM/M2
ECHO LV INTERNAL DIMENSION DIASTOLIC: 5.6 CM (ref 4.2–5.9)
ECHO LV INTERNAL DIMENSION SYSTOLIC INDEX: 2.92 CM/M2
ECHO LV INTERNAL DIMENSION SYSTOLIC: 5.4 CM
ECHO LV IVSD: 1.5 CM (ref 0.6–1)
ECHO LV IVSS: 2.9 CM
ECHO LV MASS 2D: 347.6 G (ref 88–224)
ECHO LV MASS INDEX 2D: 187.9 G/M2 (ref 49–115)
ECHO LV POSTERIOR WALL DIASTOLIC: 1.3 CM (ref 0.6–1)
ECHO LV RELATIVE WALL THICKNESS RATIO: 0.46
ECHO LVOT AREA: 4.9 CM2
ECHO LVOT DIAM: 2.5 CM
ECHO RA AREA 4C: 12 CM2
ECHO RA END SYSTOLIC VOLUME APICAL 4 CHAMBER INDEX BSA: 14 ML/M2
ECHO RA VOLUME: 26 ML
ECHO RV FRACTIONAL AREA CHANGE: 14 %
ECHO RV FREE WALL PEAK S': 7.5 CM/S
ECHO RV INTERNAL DIMENSION: 2.3 CM
ECHO RV TAPSE: 0.7 CM (ref 1.7–?)
EKG ATRIAL RATE: 107 BPM
EKG ATRIAL RATE: 88 BPM
EKG DIAGNOSIS: NORMAL
EKG DIAGNOSIS: NORMAL
EKG P AXIS: 76 DEGREES
EKG P-R INTERVAL: 208 MS
EKG Q-T INTERVAL: 284 MS
EKG Q-T INTERVAL: 346 MS
EKG QRS DURATION: 100 MS
EKG QRS DURATION: 102 MS
EKG QTC CALCULATION (BAZETT): 461 MS
EKG QTC CALCULATION (BAZETT): 477 MS
EKG R AXIS: -77 DEGREES
EKG R AXIS: -78 DEGREES
EKG T AXIS: 110 DEGREES
EKG T AXIS: 92 DEGREES
EKG VENTRICULAR RATE: 107 BPM
EKG VENTRICULAR RATE: 170 BPM
EOSINOPHIL # BLD: 0 K/UL (ref 0–0.6)
EOSINOPHIL NFR BLD: 0 %
EST. AVERAGE GLUCOSE BLD GHB EST-MCNC: 303.4 MG/DL
EST. AVERAGE GLUCOSE BLD GHB EST-MCNC: 309.2 MG/DL
GFR SERPLBLD CREATININE-BSD FMLA CKD-EPI: 65 ML/MIN/{1.73_M2}
GFR SERPLBLD CREATININE-BSD FMLA CKD-EPI: 81 ML/MIN/{1.73_M2}
GLUCOSE BLD-MCNC: 250 MG/DL (ref 70–99)
GLUCOSE BLD-MCNC: 278 MG/DL (ref 70–99)
GLUCOSE BLD-MCNC: 283 MG/DL (ref 70–99)
GLUCOSE BLD-MCNC: 287 MG/DL (ref 70–99)
GLUCOSE BLD-MCNC: 290 MG/DL (ref 70–99)
GLUCOSE BLD-MCNC: 317 MG/DL (ref 70–99)
GLUCOSE BLD-MCNC: 349 MG/DL (ref 70–99)
GLUCOSE SERPL-MCNC: 246 MG/DL (ref 70–99)
GLUCOSE SERPL-MCNC: 311 MG/DL (ref 70–99)
HAV IGM SERPL QL IA: NORMAL
HBA1C MFR BLD: 12.2 %
HBA1C MFR BLD: 12.4 %
HBV CORE IGM SERPL QL IA: NORMAL
HBV SURFACE AG SERPL QL IA: NORMAL
HCT VFR BLD AUTO: 38.6 % (ref 40.5–52.5)
HCV AB SERPL QL IA: NORMAL
HGB BLD-MCNC: 12.8 G/DL (ref 13.5–17.5)
LACTATE BLDV-SCNC: 2 MMOL/L (ref 0.4–2)
LACTATE BLDV-SCNC: 2.3 MMOL/L (ref 0.4–2)
LDH SERPL L TO P-CCNC: 689 U/L (ref 100–190)
LOEFFLER MB STN SPEC: NORMAL
LYMPHOCYTES # BLD: 1.9 K/UL (ref 1–5.1)
LYMPHOCYTES NFR BLD: 14 %
LYMPHOCYTES NFR FLD: 31 %
MACROPHAGES # FLD: 1 %
MAGNESIUM SERPL-MCNC: 2.29 MG/DL (ref 1.8–2.4)
MCH RBC QN AUTO: 31.2 PG (ref 26–34)
MCHC RBC AUTO-ENTMCNC: 33.2 G/DL (ref 31–36)
MCV RBC AUTO: 94.1 FL (ref 80–100)
MONOCYTES # BLD: 1.5 K/UL (ref 0–1.3)
MONOCYTES NFR BLD: 10.9 %
MONOCYTES NFR FLD: 4 %
MONONUCLEAR UNIDENTIFIED CELLS FLUID: 15 %
NEUTROPHIL, FLUID: 45 %
NEUTROPHILS # BLD: 10 K/UL (ref 1.7–7.7)
NEUTROPHILS NFR BLD: 74.8 %
NUC CELL # FLD: 337 /CUMM
PATH REV: YES
PERFORMED ON: ABNORMAL
PHOSPHATE SERPL-MCNC: 3.6 MG/DL (ref 2.5–4.9)
PLATELET # BLD AUTO: 182 K/UL (ref 135–450)
PMV BLD AUTO: 9.2 FL (ref 5–10.5)
POTASSIUM SERPL-SCNC: 4.8 MMOL/L (ref 3.5–5.1)
POTASSIUM SERPL-SCNC: 5 MMOL/L (ref 3.5–5.1)
PROT SERPL-MCNC: 5.7 G/DL (ref 6.4–8.2)
RBC # BLD AUTO: 4.1 M/UL (ref 4.2–5.9)
RBC FLUID: <2000 /CUMM
SODIUM SERPL-SCNC: 135 MMOL/L (ref 136–145)
SODIUM SERPL-SCNC: 136 MMOL/L (ref 136–145)
TOTAL CELLS COUNTED FLD: 100
VARIANT LYMPHS NFR FLD: 4 %
WBC # BLD AUTO: 13.4 K/UL (ref 4–11)

## 2024-11-02 PROCEDURE — 36569 INSJ PICC 5 YR+ W/O IMAGING: CPT

## 2024-11-02 PROCEDURE — 6360000002 HC RX W HCPCS: Performed by: FAMILY MEDICINE

## 2024-11-02 PROCEDURE — 93010 ELECTROCARDIOGRAM REPORT: CPT | Performed by: INTERNAL MEDICINE

## 2024-11-02 PROCEDURE — 2580000003 HC RX 258: Performed by: FAMILY MEDICINE

## 2024-11-02 PROCEDURE — 80074 ACUTE HEPATITIS PANEL: CPT

## 2024-11-02 PROCEDURE — 97165 OT EVAL LOW COMPLEX 30 MIN: CPT

## 2024-11-02 PROCEDURE — 2500000003 HC RX 250 WO HCPCS: Performed by: INTERNAL MEDICINE

## 2024-11-02 PROCEDURE — 85025 COMPLETE CBC W/AUTO DIFF WBC: CPT

## 2024-11-02 PROCEDURE — 02HV33Z INSERTION OF INFUSION DEVICE INTO SUPERIOR VENA CAVA, PERCUTANEOUS APPROACH: ICD-10-PCS | Performed by: HOSPITALIST

## 2024-11-02 PROCEDURE — 94640 AIRWAY INHALATION TREATMENT: CPT

## 2024-11-02 PROCEDURE — 6360000002 HC RX W HCPCS: Performed by: STUDENT IN AN ORGANIZED HEALTH CARE EDUCATION/TRAINING PROGRAM

## 2024-11-02 PROCEDURE — C1751 CATH, INF, PER/CENT/MIDLINE: HCPCS

## 2024-11-02 PROCEDURE — 94761 N-INVAS EAR/PLS OXIMETRY MLT: CPT

## 2024-11-02 PROCEDURE — 36415 COLL VENOUS BLD VENIPUNCTURE: CPT

## 2024-11-02 PROCEDURE — 87040 BLOOD CULTURE FOR BACTERIA: CPT

## 2024-11-02 PROCEDURE — 84100 ASSAY OF PHOSPHORUS: CPT

## 2024-11-02 PROCEDURE — 97163 PT EVAL HIGH COMPLEX 45 MIN: CPT

## 2024-11-02 PROCEDURE — 83605 ASSAY OF LACTIC ACID: CPT

## 2024-11-02 PROCEDURE — 83735 ASSAY OF MAGNESIUM: CPT

## 2024-11-02 PROCEDURE — 80076 HEPATIC FUNCTION PANEL: CPT

## 2024-11-02 PROCEDURE — 6370000000 HC RX 637 (ALT 250 FOR IP): Performed by: FAMILY MEDICINE

## 2024-11-02 PROCEDURE — 93005 ELECTROCARDIOGRAM TRACING: CPT | Performed by: FAMILY MEDICINE

## 2024-11-02 PROCEDURE — 93308 TTE F-UP OR LMTD: CPT

## 2024-11-02 PROCEDURE — 6360000002 HC RX W HCPCS: Performed by: INTERNAL MEDICINE

## 2024-11-02 PROCEDURE — 80048 BASIC METABOLIC PNL TOTAL CA: CPT

## 2024-11-02 PROCEDURE — 99291 CRITICAL CARE FIRST HOUR: CPT | Performed by: INTERNAL MEDICINE

## 2024-11-02 PROCEDURE — 83615 LACTATE (LD) (LDH) ENZYME: CPT

## 2024-11-02 PROCEDURE — 97530 THERAPEUTIC ACTIVITIES: CPT

## 2024-11-02 PROCEDURE — 71250 CT THORAX DX C-: CPT

## 2024-11-02 PROCEDURE — 2000000000 HC ICU R&B

## 2024-11-02 PROCEDURE — 6370000000 HC RX 637 (ALT 250 FOR IP): Performed by: INTERNAL MEDICINE

## 2024-11-02 PROCEDURE — 2700000000 HC OXYGEN THERAPY PER DAY

## 2024-11-02 PROCEDURE — 2580000003 HC RX 258: Performed by: REGISTERED NURSE

## 2024-11-02 PROCEDURE — 2500000003 HC RX 250 WO HCPCS

## 2024-11-02 PROCEDURE — 85520 HEPARIN ASSAY: CPT

## 2024-11-02 RX ORDER — BUPROPION HYDROCHLORIDE 100 MG/1
100 TABLET, EXTENDED RELEASE ORAL 2 TIMES DAILY
Status: DISCONTINUED | OUTPATIENT
Start: 2024-11-02 | End: 2024-11-07 | Stop reason: HOSPADM

## 2024-11-02 RX ORDER — GAUZE BANDAGE 2" X 2"
100 BANDAGE TOPICAL DAILY
Status: DISCONTINUED | OUTPATIENT
Start: 2024-11-02 | End: 2024-11-07 | Stop reason: HOSPADM

## 2024-11-02 RX ORDER — TAMSULOSIN HYDROCHLORIDE 0.4 MG/1
0.4 CAPSULE ORAL DAILY
Status: DISCONTINUED | OUTPATIENT
Start: 2024-11-02 | End: 2024-11-06

## 2024-11-02 RX ORDER — CEFEPIME HYDROCHLORIDE 2 G/50ML
2000 INJECTION, SOLUTION INTRAVENOUS EVERY 12 HOURS
Status: DISCONTINUED | OUTPATIENT
Start: 2024-11-02 | End: 2024-11-02

## 2024-11-02 RX ORDER — PRIMIDONE 50 MG/1
50 TABLET ORAL DAILY
Status: DISCONTINUED | OUTPATIENT
Start: 2024-11-02 | End: 2024-11-07 | Stop reason: HOSPADM

## 2024-11-02 RX ORDER — BUDESONIDE AND FORMOTEROL FUMARATE DIHYDRATE 160; 4.5 UG/1; UG/1
2 AEROSOL RESPIRATORY (INHALATION)
Status: DISCONTINUED | OUTPATIENT
Start: 2024-11-02 | End: 2024-11-07 | Stop reason: HOSPADM

## 2024-11-02 RX ORDER — HEPARIN SODIUM 1000 [USP'U]/ML
4000 INJECTION, SOLUTION INTRAVENOUS; SUBCUTANEOUS ONCE
Status: COMPLETED | OUTPATIENT
Start: 2024-11-02 | End: 2024-11-02

## 2024-11-02 RX ORDER — IPRATROPIUM BROMIDE AND ALBUTEROL SULFATE 2.5; .5 MG/3ML; MG/3ML
1 SOLUTION RESPIRATORY (INHALATION)
Status: DISCONTINUED | OUTPATIENT
Start: 2024-11-02 | End: 2024-11-06

## 2024-11-02 RX ORDER — GLUCAGON 1 MG/ML
1 KIT INJECTION PRN
Status: DISCONTINUED | OUTPATIENT
Start: 2024-11-02 | End: 2024-11-07 | Stop reason: HOSPADM

## 2024-11-02 RX ORDER — QUETIAPINE FUMARATE 100 MG/1
400 TABLET, FILM COATED ORAL NIGHTLY
Status: DISCONTINUED | OUTPATIENT
Start: 2024-11-02 | End: 2024-11-07 | Stop reason: HOSPADM

## 2024-11-02 RX ORDER — INSULIN LISPRO 100 [IU]/ML
0-4 INJECTION, SOLUTION INTRAVENOUS; SUBCUTANEOUS
Status: DISCONTINUED | OUTPATIENT
Start: 2024-11-02 | End: 2024-11-03

## 2024-11-02 RX ORDER — INSULIN GLARGINE 100 [IU]/ML
20 INJECTION, SOLUTION SUBCUTANEOUS EVERY MORNING
Status: DISCONTINUED | OUTPATIENT
Start: 2024-11-02 | End: 2024-11-06

## 2024-11-02 RX ORDER — FUROSEMIDE 10 MG/ML
40 INJECTION INTRAMUSCULAR; INTRAVENOUS 2 TIMES DAILY
Status: DISCONTINUED | OUTPATIENT
Start: 2024-11-02 | End: 2024-11-07 | Stop reason: HOSPADM

## 2024-11-02 RX ORDER — LINEZOLID 2 MG/ML
600 INJECTION, SOLUTION INTRAVENOUS EVERY 12 HOURS
Status: DISCONTINUED | OUTPATIENT
Start: 2024-11-02 | End: 2024-11-05

## 2024-11-02 RX ORDER — HEPARIN SODIUM 1000 [USP'U]/ML
2000 INJECTION, SOLUTION INTRAVENOUS; SUBCUTANEOUS ONCE
Status: COMPLETED | OUTPATIENT
Start: 2024-11-02 | End: 2024-11-02

## 2024-11-02 RX ORDER — DIGOXIN 0.25 MG/ML
250 INJECTION INTRAMUSCULAR; INTRAVENOUS ONCE
Status: COMPLETED | OUTPATIENT
Start: 2024-11-02 | End: 2024-11-02

## 2024-11-02 RX ORDER — DEXTROSE MONOHYDRATE 100 MG/ML
INJECTION, SOLUTION INTRAVENOUS CONTINUOUS PRN
Status: DISCONTINUED | OUTPATIENT
Start: 2024-11-02 | End: 2024-11-07 | Stop reason: HOSPADM

## 2024-11-02 RX ORDER — CEFEPIME HYDROCHLORIDE 2 G/50ML
2000 INJECTION, SOLUTION INTRAVENOUS ONCE
Status: COMPLETED | OUTPATIENT
Start: 2024-11-02 | End: 2024-11-02

## 2024-11-02 RX ADMIN — FUROSEMIDE 40 MG: 10 INJECTION, SOLUTION INTRAMUSCULAR; INTRAVENOUS at 17:57

## 2024-11-02 RX ADMIN — HEPARIN SODIUM 4000 UNITS: 1000 INJECTION INTRAVENOUS; SUBCUTANEOUS at 09:03

## 2024-11-02 RX ADMIN — METOPROLOL SUCCINATE 25 MG: 25 TABLET, EXTENDED RELEASE ORAL at 08:30

## 2024-11-02 RX ADMIN — AMIODARONE HYDROCHLORIDE 150 MG: 1.5 INJECTION, SOLUTION INTRAVENOUS at 23:50

## 2024-11-02 RX ADMIN — IPRATROPIUM BROMIDE AND ALBUTEROL SULFATE 1 DOSE: .5; 2.5 SOLUTION RESPIRATORY (INHALATION) at 11:26

## 2024-11-02 RX ADMIN — HEPARIN SODIUM 2000 UNITS: 1000 INJECTION INTRAVENOUS; SUBCUTANEOUS at 18:02

## 2024-11-02 RX ADMIN — INSULIN GLARGINE 20 UNITS: 100 INJECTION, SOLUTION SUBCUTANEOUS at 08:30

## 2024-11-02 RX ADMIN — Medication 100 MG: at 08:30

## 2024-11-02 RX ADMIN — INSULIN LISPRO 2 UNITS: 100 INJECTION, SOLUTION INTRAVENOUS; SUBCUTANEOUS at 12:02

## 2024-11-02 RX ADMIN — DIGOXIN 250 MCG: 0.25 INJECTION INTRAMUSCULAR; INTRAVENOUS at 01:20

## 2024-11-02 RX ADMIN — CEFEPIME 2000 MG: 2 INJECTION, POWDER, FOR SOLUTION INTRAVENOUS at 21:24

## 2024-11-02 RX ADMIN — ASPIRIN 81 MG: 81 TABLET, COATED ORAL at 08:30

## 2024-11-02 RX ADMIN — PRIMIDONE 50 MG: 50 TABLET ORAL at 08:31

## 2024-11-02 RX ADMIN — CEFEPIME 2000 MG: 2 INJECTION, POWDER, FOR SOLUTION INTRAVENOUS at 13:39

## 2024-11-02 RX ADMIN — HEPARIN SODIUM 4000 UNITS: 1000 INJECTION INTRAVENOUS; SUBCUTANEOUS at 02:40

## 2024-11-02 RX ADMIN — HEPARIN SODIUM 1660 UNITS/HR: 10000 INJECTION, SOLUTION INTRAVENOUS at 17:55

## 2024-11-02 RX ADMIN — INSULIN LISPRO 2 UNITS: 100 INJECTION, SOLUTION INTRAVENOUS; SUBCUTANEOUS at 08:07

## 2024-11-02 RX ADMIN — TIOTROPIUM BROMIDE INHALATION SPRAY 2 PUFF: 3.12 SPRAY, METERED RESPIRATORY (INHALATION) at 11:57

## 2024-11-02 RX ADMIN — BUPROPION HYDROCHLORIDE 100 MG: 100 TABLET, FILM COATED, EXTENDED RELEASE ORAL at 21:33

## 2024-11-02 RX ADMIN — SODIUM CHLORIDE, PRESERVATIVE FREE 10 ML: 5 INJECTION INTRAVENOUS at 21:00

## 2024-11-02 RX ADMIN — INSULIN LISPRO 2 UNITS: 100 INJECTION, SOLUTION INTRAVENOUS; SUBCUTANEOUS at 21:33

## 2024-11-02 RX ADMIN — AMIODARONE HYDROCHLORIDE 0.5 MG/MIN: 1.8 INJECTION, SOLUTION INTRAVENOUS at 04:31

## 2024-11-02 RX ADMIN — FUROSEMIDE 40 MG: 10 INJECTION, SOLUTION INTRAMUSCULAR; INTRAVENOUS at 12:39

## 2024-11-02 RX ADMIN — BUDESONIDE AND FORMOTEROL FUMARATE DIHYDRATE 2 PUFF: 160; 4.5 AEROSOL RESPIRATORY (INHALATION) at 11:57

## 2024-11-02 RX ADMIN — QUETIAPINE FUMARATE 400 MG: 100 TABLET ORAL at 21:33

## 2024-11-02 RX ADMIN — BUPROPION HYDROCHLORIDE 100 MG: 100 TABLET, FILM COATED, EXTENDED RELEASE ORAL at 08:31

## 2024-11-02 RX ADMIN — AMIODARONE HYDROCHLORIDE 0.5 MG/MIN: 1.8 INJECTION, SOLUTION INTRAVENOUS at 17:09

## 2024-11-02 RX ADMIN — CEFEPIME HYDROCHLORIDE 2000 MG: 2 INJECTION, SOLUTION INTRAVENOUS at 01:33

## 2024-11-02 RX ADMIN — LINEZOLID 600 MG: 600 INJECTION, SOLUTION INTRAVENOUS at 02:06

## 2024-11-02 RX ADMIN — EMPAGLIFLOZIN 10 MG: 10 TABLET, FILM COATED ORAL at 08:31

## 2024-11-02 RX ADMIN — IPRATROPIUM BROMIDE AND ALBUTEROL SULFATE 1 DOSE: .5; 2.5 SOLUTION RESPIRATORY (INHALATION) at 15:40

## 2024-11-02 RX ADMIN — LINEZOLID 600 MG: 600 INJECTION, SOLUTION INTRAVENOUS at 12:45

## 2024-11-02 RX ADMIN — TAMSULOSIN HYDROCHLORIDE 0.4 MG: 0.4 CAPSULE ORAL at 08:30

## 2024-11-02 RX ADMIN — INSULIN LISPRO 3 UNITS: 100 INJECTION, SOLUTION INTRAVENOUS; SUBCUTANEOUS at 16:31

## 2024-11-02 ASSESSMENT — PAIN SCALES - GENERAL
PAINLEVEL_OUTOF10: 0

## 2024-11-02 NOTE — ED NOTES
Additional 325ml urine output logged at this time. Chest tube still at total of 2500 ml out total and atrium marked at this time. Fluid from chest tube sent to lab with [employer] labels.     Confirmed with Yuniel in lab that the chest tube fluid could be sent in capped syringes without needles via the tube system.

## 2024-11-02 NOTE — ED NOTES
Report called to SEBASTIÁN Wright for Rm 2110. Questions addressed and report accepted. Transport by RN to be arranged as time allows.

## 2024-11-02 NOTE — H&P
V2.0  History and Physical      Name:  Don Tee /Age/Sex: 1955  (68 y.o. male)   MRN & CSN:  [id number] & [id number] Encounter Date/Time: 2024 11:59 PM EDT   Location:  S4Y-0660 PCP: Omid Barry, YULIA Jeff CNP       Hospital Day: 1    Assessment and Plan:   Don Tee is a 68 y.o. male smoker with a pmh of insulin-dependent diabetes mellitus with complications, COPD, BPH with urinary retention, CAD, hypertension, hyperlipidemia, CKD, GERD, movement disorder who presents with DKA, type 2, not at goal (HCC) and shortness of breath.    Hospital Problems             Last Modified POA    * (Principal) DKA, type 2, not at goal (HCC) 2024 Yes    Acute respiratory failure with hypoxia 2024 Yes    Severe sepsis with acute or organ dysfunction      VTE (venous thromboembolism) 2024 Yes    Rapid A-fib 2024 Yes    Acute liver failure without hepatic coma 2024 Yes    GABRIELLE (acute kidney injury) (HCC) 2024 Yes    Acute heart failure (HCC) 2024 Yes    Centrilobular emphysema (HCC) 2024 Yes       Plan:  IV fluids with caution, insulin drip as per DKA protocol, with monitoring oral BMP every 4 hours  Supplemental oxygen to keep sats between 90 and 94%, pulmonology/intensivist consult  Blood cultures x 2, UA, empiric cefepime and Zyvox, ID consult  Heparin drip  Amiodarone drip, 2D echo and cardiology consult  Trend liver enzymes-follow-up hepatitis panel,   Check fractional secretion of sodium, trend creatinine and monitor electrolytes, avoid nephrotoxic agents including hypotension, [place] for strict I's and Os, nephrology consult  Resume home regimen for chronic stable conditions with the above-mentioned modifications    Disposition:   Current Living situation: Home alone  Expected Disposition: To be determined  Estimated D/C: 5 days    Diet Diet NPO   DVT Prophylaxis [] Lovenox, [x]  Heparin, [] SCDs, [] Ambulation,  [] Eliquis, [] Xarelto, [] Coumadin

## 2024-11-02 NOTE — ED NOTES
During Dr Barajas placement of chest tube on left side, pt became very tachy, Dr Costa advised Atrial fib with RVR, rate was 180's then 190's. Pt was synchronized cardioverted at 200J with Dr Costa at bedside. Pt converted back to sinus rhythm and now currently in low 100's. Chest tube was then placed with immediate release of steady flow of yellow tinged fluid. Estimated loss onto bed was between 50 and 100 ml prior to being able to atrium attached.

## 2024-11-03 PROBLEM — I26.09 ACUTE PULMONARY EMBOLISM WITH ACUTE COR PULMONALE (HCC): Status: ACTIVE | Noted: 2024-11-03

## 2024-11-03 LAB
ALBUMIN SERPL-MCNC: 3 G/DL (ref 3.4–5)
ALP SERPL-CCNC: 295 U/L (ref 40–129)
ALT SERPL-CCNC: 1241 U/L (ref 10–40)
ANION GAP SERPL CALCULATED.3IONS-SCNC: 10 MMOL/L (ref 3–16)
ANTI-XA UNFRAC HEPARIN: 0.35 IU/ML (ref 0.3–0.7)
ANTI-XA UNFRAC HEPARIN: 0.41 IU/ML (ref 0.3–0.7)
AST SERPL-CCNC: 496 U/L (ref 15–37)
BASOPHILS # BLD: 0.1 K/UL (ref 0–0.2)
BASOPHILS NFR BLD: 0.8 %
BILIRUB DIRECT SERPL-MCNC: 0.4 MG/DL (ref 0–0.3)
BILIRUB INDIRECT SERPL-MCNC: 0.3 MG/DL (ref 0–1)
BILIRUB SERPL-MCNC: 0.7 MG/DL (ref 0–1)
BUN SERPL-MCNC: 48 MG/DL (ref 7–20)
CALCIUM SERPL-MCNC: 8.2 MG/DL (ref 8.3–10.6)
CHLORIDE SERPL-SCNC: 96 MMOL/L (ref 99–110)
CO2 SERPL-SCNC: 27 MMOL/L (ref 21–32)
CREAT SERPL-MCNC: 0.9 MG/DL (ref 0.8–1.3)
DEPRECATED RDW RBC AUTO: 15.7 % (ref 12.4–15.4)
EKG ATRIAL RATE: 74 BPM
EKG ATRIAL RATE: 75 BPM
EKG DIAGNOSIS: NORMAL
EKG DIAGNOSIS: NORMAL
EKG P AXIS: 70 DEGREES
EKG P AXIS: 83 DEGREES
EKG P-R INTERVAL: 188 MS
EKG P-R INTERVAL: 190 MS
EKG Q-T INTERVAL: 494 MS
EKG Q-T INTERVAL: 524 MS
EKG QRS DURATION: 116 MS
EKG QRS DURATION: 118 MS
EKG QTC CALCULATION (BAZETT): 551 MS
EKG QTC CALCULATION (BAZETT): 581 MS
EKG R AXIS: -49 DEGREES
EKG R AXIS: -54 DEGREES
EKG T AXIS: 87 DEGREES
EKG T AXIS: 98 DEGREES
EKG VENTRICULAR RATE: 74 BPM
EKG VENTRICULAR RATE: 75 BPM
EOSINOPHIL # BLD: 0 K/UL (ref 0–0.6)
EOSINOPHIL NFR BLD: 0.2 %
EST. AVERAGE GLUCOSE BLD GHB EST-MCNC: 306.3 MG/DL
GFR SERPLBLD CREATININE-BSD FMLA CKD-EPI: >90 ML/MIN/{1.73_M2}
GLUCOSE BLD-MCNC: 150 MG/DL (ref 70–99)
GLUCOSE BLD-MCNC: 171 MG/DL (ref 70–99)
GLUCOSE BLD-MCNC: 185 MG/DL (ref 70–99)
GLUCOSE BLD-MCNC: 185 MG/DL (ref 70–99)
GLUCOSE SERPL-MCNC: 217 MG/DL (ref 70–99)
HBA1C MFR BLD: 12.3 %
HCT VFR BLD AUTO: 36.1 % (ref 40.5–52.5)
HGB BLD-MCNC: 11.8 G/DL (ref 13.5–17.5)
LYMPHOCYTES # BLD: 1.7 K/UL (ref 1–5.1)
LYMPHOCYTES NFR BLD: 13.6 %
MAGNESIUM SERPL-MCNC: 1.72 MG/DL (ref 1.8–2.4)
MCH RBC QN AUTO: 30.7 PG (ref 26–34)
MCHC RBC AUTO-ENTMCNC: 32.8 G/DL (ref 31–36)
MCV RBC AUTO: 93.8 FL (ref 80–100)
MONOCYTES # BLD: 1 K/UL (ref 0–1.3)
MONOCYTES NFR BLD: 8.1 %
NEUTROPHILS # BLD: 9.6 K/UL (ref 1.7–7.7)
NEUTROPHILS NFR BLD: 77.3 %
PERFORMED ON: ABNORMAL
PHOSPHATE SERPL-MCNC: 3.2 MG/DL (ref 2.5–4.9)
PLATELET # BLD AUTO: 166 K/UL (ref 135–450)
PMV BLD AUTO: 8.7 FL (ref 5–10.5)
POTASSIUM SERPL-SCNC: 3.2 MMOL/L (ref 3.5–5.1)
PROT SERPL-MCNC: 5.3 G/DL (ref 6.4–8.2)
RBC # BLD AUTO: 3.85 M/UL (ref 4.2–5.9)
SODIUM SERPL-SCNC: 133 MMOL/L (ref 136–145)
WBC # BLD AUTO: 12.5 K/UL (ref 4–11)

## 2024-11-03 PROCEDURE — 2500000003 HC RX 250 WO HCPCS: Performed by: INTERNAL MEDICINE

## 2024-11-03 PROCEDURE — 94640 AIRWAY INHALATION TREATMENT: CPT

## 2024-11-03 PROCEDURE — 85025 COMPLETE CBC W/AUTO DIFF WBC: CPT

## 2024-11-03 PROCEDURE — P9047 ALBUMIN (HUMAN), 25%, 50ML: HCPCS

## 2024-11-03 PROCEDURE — 84100 ASSAY OF PHOSPHORUS: CPT

## 2024-11-03 PROCEDURE — 83036 HEMOGLOBIN GLYCOSYLATED A1C: CPT

## 2024-11-03 PROCEDURE — 93010 ELECTROCARDIOGRAM REPORT: CPT | Performed by: INTERNAL MEDICINE

## 2024-11-03 PROCEDURE — 83735 ASSAY OF MAGNESIUM: CPT

## 2024-11-03 PROCEDURE — 80048 BASIC METABOLIC PNL TOTAL CA: CPT

## 2024-11-03 PROCEDURE — 2000000000 HC ICU R&B

## 2024-11-03 PROCEDURE — 6360000002 HC RX W HCPCS: Performed by: INTERNAL MEDICINE

## 2024-11-03 PROCEDURE — 6360000002 HC RX W HCPCS: Performed by: FAMILY MEDICINE

## 2024-11-03 PROCEDURE — 93005 ELECTROCARDIOGRAM TRACING: CPT | Performed by: REGISTERED NURSE

## 2024-11-03 PROCEDURE — 6360000002 HC RX W HCPCS

## 2024-11-03 PROCEDURE — 6360000002 HC RX W HCPCS: Performed by: STUDENT IN AN ORGANIZED HEALTH CARE EDUCATION/TRAINING PROGRAM

## 2024-11-03 PROCEDURE — 85520 HEPARIN ASSAY: CPT

## 2024-11-03 PROCEDURE — 2580000003 HC RX 258: Performed by: REGISTERED NURSE

## 2024-11-03 PROCEDURE — 2580000003 HC RX 258: Performed by: FAMILY MEDICINE

## 2024-11-03 PROCEDURE — 6370000000 HC RX 637 (ALT 250 FOR IP): Performed by: INTERNAL MEDICINE

## 2024-11-03 PROCEDURE — 94761 N-INVAS EAR/PLS OXIMETRY MLT: CPT

## 2024-11-03 PROCEDURE — 80076 HEPATIC FUNCTION PANEL: CPT

## 2024-11-03 PROCEDURE — 6370000000 HC RX 637 (ALT 250 FOR IP): Performed by: REGISTERED NURSE

## 2024-11-03 PROCEDURE — 2500000003 HC RX 250 WO HCPCS

## 2024-11-03 PROCEDURE — 6370000000 HC RX 637 (ALT 250 FOR IP): Performed by: FAMILY MEDICINE

## 2024-11-03 PROCEDURE — 99291 CRITICAL CARE FIRST HOUR: CPT | Performed by: INTERNAL MEDICINE

## 2024-11-03 PROCEDURE — 2700000000 HC OXYGEN THERAPY PER DAY

## 2024-11-03 RX ORDER — NOREPINEPHRINE BITARTRATE 0.06 MG/ML
INJECTION, SOLUTION INTRAVENOUS
Status: COMPLETED
Start: 2024-11-03 | End: 2024-11-03

## 2024-11-03 RX ORDER — INSULIN LISPRO 100 [IU]/ML
0-16 INJECTION, SOLUTION INTRAVENOUS; SUBCUTANEOUS
Status: DISCONTINUED | OUTPATIENT
Start: 2024-11-03 | End: 2024-11-07 | Stop reason: HOSPADM

## 2024-11-03 RX ORDER — MILRINONE LACTATE 0.2 MG/ML
.0625-.75 INJECTION, SOLUTION INTRAVENOUS CONTINUOUS
Status: DISCONTINUED | OUTPATIENT
Start: 2024-11-03 | End: 2024-11-03

## 2024-11-03 RX ORDER — DIGOXIN 0.25 MG/ML
250 INJECTION INTRAMUSCULAR; INTRAVENOUS ONCE
Status: COMPLETED | OUTPATIENT
Start: 2024-11-03 | End: 2024-11-03

## 2024-11-03 RX ORDER — MILRINONE LACTATE 0.2 MG/ML
.0625-.75 INJECTION, SOLUTION INTRAVENOUS CONTINUOUS
Status: DISCONTINUED | OUTPATIENT
Start: 2024-11-03 | End: 2024-11-07 | Stop reason: HOSPADM

## 2024-11-03 RX ORDER — 0.9 % SODIUM CHLORIDE 0.9 %
500 INTRAVENOUS SOLUTION INTRAVENOUS ONCE
Status: DISCONTINUED | OUTPATIENT
Start: 2024-11-03 | End: 2024-11-04

## 2024-11-03 RX ORDER — 0.9 % SODIUM CHLORIDE 0.9 %
500 INTRAVENOUS SOLUTION INTRAVENOUS ONCE
Status: COMPLETED | OUTPATIENT
Start: 2024-11-03 | End: 2024-11-03

## 2024-11-03 RX ORDER — ALBUMIN (HUMAN) 12.5 G/50ML
25 SOLUTION INTRAVENOUS ONCE
Status: COMPLETED | OUTPATIENT
Start: 2024-11-03 | End: 2024-11-03

## 2024-11-03 RX ORDER — NOREPINEPHRINE BITARTRATE 0.06 MG/ML
1-100 INJECTION, SOLUTION INTRAVENOUS CONTINUOUS
Status: DISCONTINUED | OUTPATIENT
Start: 2024-11-03 | End: 2024-11-05

## 2024-11-03 RX ORDER — POTASSIUM CHLORIDE 29.8 MG/ML
20 INJECTION INTRAVENOUS PRN
Status: DISCONTINUED | OUTPATIENT
Start: 2024-11-03 | End: 2024-11-07 | Stop reason: HOSPADM

## 2024-11-03 RX ADMIN — FUROSEMIDE 40 MG: 10 INJECTION, SOLUTION INTRAMUSCULAR; INTRAVENOUS at 16:46

## 2024-11-03 RX ADMIN — TAMSULOSIN HYDROCHLORIDE 0.4 MG: 0.4 CAPSULE ORAL at 08:54

## 2024-11-03 RX ADMIN — METOPROLOL SUCCINATE 25 MG: 25 TABLET, EXTENDED RELEASE ORAL at 08:54

## 2024-11-03 RX ADMIN — BUPROPION HYDROCHLORIDE 100 MG: 100 TABLET, FILM COATED, EXTENDED RELEASE ORAL at 20:46

## 2024-11-03 RX ADMIN — SODIUM CHLORIDE 500 ML: 9 INJECTION, SOLUTION INTRAVENOUS at 00:04

## 2024-11-03 RX ADMIN — AMIODARONE HYDROCHLORIDE 0.5 MG/MIN: 1.8 INJECTION, SOLUTION INTRAVENOUS at 04:25

## 2024-11-03 RX ADMIN — MILRINONE LACTATE 0.12 MCG/KG/MIN: 0.2 INJECTION, SOLUTION INTRAVENOUS at 01:30

## 2024-11-03 RX ADMIN — HEPARIN SODIUM 1660 UNITS/HR: 10000 INJECTION, SOLUTION INTRAVENOUS at 09:06

## 2024-11-03 RX ADMIN — IPRATROPIUM BROMIDE AND ALBUTEROL SULFATE 1 DOSE: .5; 2.5 SOLUTION RESPIRATORY (INHALATION) at 19:46

## 2024-11-03 RX ADMIN — FUROSEMIDE 40 MG: 10 INJECTION, SOLUTION INTRAMUSCULAR; INTRAVENOUS at 08:54

## 2024-11-03 RX ADMIN — TIOTROPIUM BROMIDE INHALATION SPRAY 2 PUFF: 3.12 SPRAY, METERED RESPIRATORY (INHALATION) at 07:57

## 2024-11-03 RX ADMIN — MILRINONE LACTATE 0.12 MCG/KG/MIN: 0.2 INJECTION, SOLUTION INTRAVENOUS at 01:33

## 2024-11-03 RX ADMIN — NOREPINEPHRINE BITARTRATE 22 MCG/MIN: 64 SOLUTION INTRAVENOUS at 13:37

## 2024-11-03 RX ADMIN — Medication 5 MCG/MIN: at 03:12

## 2024-11-03 RX ADMIN — POTASSIUM CHLORIDE 20 MEQ: 29.8 INJECTION, SOLUTION INTRAVENOUS at 11:33

## 2024-11-03 RX ADMIN — ALBUMIN (HUMAN) 25 G: 0.25 INJECTION, SOLUTION INTRAVENOUS at 00:42

## 2024-11-03 RX ADMIN — INSULIN GLARGINE 20 UNITS: 100 INJECTION, SOLUTION SUBCUTANEOUS at 08:54

## 2024-11-03 RX ADMIN — DIGOXIN 250 MCG: 0.25 INJECTION INTRAMUSCULAR; INTRAVENOUS at 00:42

## 2024-11-03 RX ADMIN — MILRINONE LACTATE 0.25 MCG/KG/MIN: 0.2 INJECTION, SOLUTION INTRAVENOUS at 20:40

## 2024-11-03 RX ADMIN — BUDESONIDE AND FORMOTEROL FUMARATE DIHYDRATE 2 PUFF: 160; 4.5 AEROSOL RESPIRATORY (INHALATION) at 19:46

## 2024-11-03 RX ADMIN — INSULIN LISPRO 4 UNITS: 100 INJECTION, SOLUTION INTRAVENOUS; SUBCUTANEOUS at 11:13

## 2024-11-03 RX ADMIN — SODIUM CHLORIDE, PRESERVATIVE FREE 10 ML: 5 INJECTION INTRAVENOUS at 20:46

## 2024-11-03 RX ADMIN — INSULIN LISPRO 4 UNITS: 100 INJECTION, SOLUTION INTRAVENOUS; SUBCUTANEOUS at 16:46

## 2024-11-03 RX ADMIN — IPRATROPIUM BROMIDE AND ALBUTEROL SULFATE 1 DOSE: .5; 2.5 SOLUTION RESPIRATORY (INHALATION) at 11:57

## 2024-11-03 RX ADMIN — ASPIRIN 81 MG: 81 TABLET, COATED ORAL at 08:54

## 2024-11-03 RX ADMIN — CEFEPIME 2000 MG: 2 INJECTION, POWDER, FOR SOLUTION INTRAVENOUS at 12:35

## 2024-11-03 RX ADMIN — BUDESONIDE AND FORMOTEROL FUMARATE DIHYDRATE 2 PUFF: 160; 4.5 AEROSOL RESPIRATORY (INHALATION) at 07:57

## 2024-11-03 RX ADMIN — Medication 100 MG: at 08:54

## 2024-11-03 RX ADMIN — LINEZOLID 600 MG: 600 INJECTION, SOLUTION INTRAVENOUS at 01:37

## 2024-11-03 RX ADMIN — AMIODARONE HYDROCHLORIDE 0.5 MG/MIN: 1.8 INJECTION, SOLUTION INTRAVENOUS at 15:42

## 2024-11-03 RX ADMIN — CEFEPIME 2000 MG: 2 INJECTION, POWDER, FOR SOLUTION INTRAVENOUS at 04:35

## 2024-11-03 RX ADMIN — BUPROPION HYDROCHLORIDE 100 MG: 100 TABLET, FILM COATED, EXTENDED RELEASE ORAL at 08:54

## 2024-11-03 RX ADMIN — POTASSIUM CHLORIDE 20 MEQ: 29.8 INJECTION, SOLUTION INTRAVENOUS at 06:38

## 2024-11-03 RX ADMIN — QUETIAPINE FUMARATE 400 MG: 100 TABLET ORAL at 20:43

## 2024-11-03 RX ADMIN — CEFEPIME 2000 MG: 2 INJECTION, POWDER, FOR SOLUTION INTRAVENOUS at 20:43

## 2024-11-03 RX ADMIN — LINEZOLID 600 MG: 600 INJECTION, SOLUTION INTRAVENOUS at 12:30

## 2024-11-03 RX ADMIN — NOREPINEPHRINE BITARTRATE 5 MCG/MIN: 64 SOLUTION INTRAVENOUS at 03:12

## 2024-11-03 RX ADMIN — IPRATROPIUM BROMIDE AND ALBUTEROL SULFATE 1 DOSE: .5; 2.5 SOLUTION RESPIRATORY (INHALATION) at 07:57

## 2024-11-03 RX ADMIN — PRIMIDONE 50 MG: 50 TABLET ORAL at 08:54

## 2024-11-03 RX ADMIN — IPRATROPIUM BROMIDE AND ALBUTEROL SULFATE 1 DOSE: .5; 2.5 SOLUTION RESPIRATORY (INHALATION) at 16:00

## 2024-11-03 ASSESSMENT — PAIN SCALES - GENERAL
PAINLEVEL_OUTOF10: 0

## 2024-11-04 ENCOUNTER — APPOINTMENT (OUTPATIENT)
Dept: GENERAL RADIOLOGY | Age: 69
DRG: 871 | End: 2024-11-04
Payer: MEDICARE

## 2024-11-04 PROBLEM — R57.0 CARDIOGENIC SHOCK: Status: ACTIVE | Noted: 2024-11-04

## 2024-11-04 PROBLEM — R74.01 ELEVATED LIVER TRANSAMINASE LEVEL: Status: ACTIVE | Noted: 2024-11-04

## 2024-11-04 PROBLEM — E11.65 POORLY CONTROLLED DIABETES MELLITUS (HCC): Status: ACTIVE | Noted: 2024-11-04

## 2024-11-04 PROBLEM — J93.83 OTHER PNEUMOTHORAX: Status: ACTIVE | Noted: 2024-11-04

## 2024-11-04 PROBLEM — I50.21 ACUTE SYSTOLIC CHF (CONGESTIVE HEART FAILURE), NYHA CLASS 4 (HCC): Status: ACTIVE | Noted: 2024-11-01

## 2024-11-04 PROBLEM — E87.20 LACTIC ACID ACIDOSIS: Status: ACTIVE | Noted: 2024-11-04

## 2024-11-04 PROBLEM — D72.828 NEUTROPHILIA: Status: ACTIVE | Noted: 2024-11-04

## 2024-11-04 PROBLEM — J90 PLEURAL EFFUSION: Status: ACTIVE | Noted: 2024-11-04

## 2024-11-04 LAB
ALBUMIN SERPL-MCNC: 2.8 G/DL (ref 3.4–5)
ALBUMIN SERPL-MCNC: 2.9 G/DL (ref 3.4–5)
ALBUMIN SERPL-MCNC: 3 G/DL (ref 3.4–5)
ALP SERPL-CCNC: 256 U/L (ref 40–129)
ALT SERPL-CCNC: 1182 U/L (ref 10–40)
ANION GAP SERPL CALCULATED.3IONS-SCNC: 10 MMOL/L (ref 3–16)
ANION GAP SERPL CALCULATED.3IONS-SCNC: 11 MMOL/L (ref 3–16)
ANION GAP SERPL CALCULATED.3IONS-SCNC: 8 MMOL/L (ref 3–16)
ANTI-XA UNFRAC HEPARIN: 0.33 IU/ML (ref 0.3–0.7)
AST SERPL-CCNC: 519 U/L (ref 15–37)
BACTERIA FLD AEROBE CULT: NORMAL
BASOPHILS # BLD: 0 K/UL (ref 0–0.2)
BASOPHILS NFR BLD: 0.1 %
BILIRUB DIRECT SERPL-MCNC: 0.4 MG/DL (ref 0–0.3)
BILIRUB INDIRECT SERPL-MCNC: 0.3 MG/DL (ref 0–1)
BILIRUB SERPL-MCNC: 0.7 MG/DL (ref 0–1)
BUN SERPL-MCNC: 24 MG/DL (ref 7–20)
BUN SERPL-MCNC: 26 MG/DL (ref 7–20)
BUN SERPL-MCNC: 30 MG/DL (ref 7–20)
CALCIUM SERPL-MCNC: 7.8 MG/DL (ref 8.3–10.6)
CALCIUM SERPL-MCNC: 7.9 MG/DL (ref 8.3–10.6)
CALCIUM SERPL-MCNC: 7.9 MG/DL (ref 8.3–10.6)
CHLORIDE SERPL-SCNC: 91 MMOL/L (ref 99–110)
CHLORIDE SERPL-SCNC: 93 MMOL/L (ref 99–110)
CHLORIDE SERPL-SCNC: 94 MMOL/L (ref 99–110)
CO2 SERPL-SCNC: 29 MMOL/L (ref 21–32)
CO2 SERPL-SCNC: 29 MMOL/L (ref 21–32)
CO2 SERPL-SCNC: 31 MMOL/L (ref 21–32)
CREAT SERPL-MCNC: 0.7 MG/DL (ref 0.8–1.3)
CRP SERPL-MCNC: 87.8 MG/L (ref 0–5.1)
DEPRECATED RDW RBC AUTO: 15.8 % (ref 12.4–15.4)
EOSINOPHIL # BLD: 0 K/UL (ref 0–0.6)
EOSINOPHIL NFR BLD: 0.2 %
ERYTHROCYTE [SEDIMENTATION RATE] IN BLOOD BY WESTERGREN METHOD: 16 MM/HR (ref 0–20)
GFR SERPLBLD CREATININE-BSD FMLA CKD-EPI: >90 ML/MIN/{1.73_M2}
GLUCOSE BLD-MCNC: 147 MG/DL (ref 70–99)
GLUCOSE BLD-MCNC: 229 MG/DL (ref 70–99)
GLUCOSE BLD-MCNC: 246 MG/DL (ref 70–99)
GLUCOSE BLD-MCNC: 88 MG/DL (ref 70–99)
GLUCOSE SERPL-MCNC: 109 MG/DL (ref 70–99)
GLUCOSE SERPL-MCNC: 145 MG/DL (ref 70–99)
GLUCOSE SERPL-MCNC: 242 MG/DL (ref 70–99)
GRAM STN SPEC: NORMAL
HCT VFR BLD AUTO: 36.9 % (ref 40.5–52.5)
HGB BLD-MCNC: 12.5 G/DL (ref 13.5–17.5)
LYMPHOCYTES # BLD: 1.1 K/UL (ref 1–5.1)
LYMPHOCYTES NFR BLD: 11.2 %
MAGNESIUM SERPL-MCNC: 1.5 MG/DL (ref 1.8–2.4)
MCH RBC QN AUTO: 31.5 PG (ref 26–34)
MCHC RBC AUTO-ENTMCNC: 33.8 G/DL (ref 31–36)
MCV RBC AUTO: 93.1 FL (ref 80–100)
MONOCYTES # BLD: 0.9 K/UL (ref 0–1.3)
MONOCYTES NFR BLD: 9.2 %
NEUTROPHILS # BLD: 8.1 K/UL (ref 1.7–7.7)
NEUTROPHILS NFR BLD: 79.3 %
PATH CONSULT FLUID: NORMAL
PERFORMED ON: ABNORMAL
PERFORMED ON: NORMAL
PHOSPHATE SERPL-MCNC: 2.4 MG/DL (ref 2.5–4.9)
PHOSPHATE SERPL-MCNC: 2.4 MG/DL (ref 2.5–4.9)
PHOSPHATE SERPL-MCNC: 2.7 MG/DL (ref 2.5–4.9)
PLATELET # BLD AUTO: 133 K/UL (ref 135–450)
PMV BLD AUTO: 9 FL (ref 5–10.5)
POTASSIUM SERPL-SCNC: 2.9 MMOL/L (ref 3.5–5.1)
POTASSIUM SERPL-SCNC: 3.4 MMOL/L (ref 3.5–5.1)
POTASSIUM SERPL-SCNC: 3.9 MMOL/L (ref 3.5–5.1)
PROT SERPL-MCNC: 5.3 G/DL (ref 6.4–8.2)
RBC # BLD AUTO: 3.97 M/UL (ref 4.2–5.9)
SODIUM SERPL-SCNC: 131 MMOL/L (ref 136–145)
SODIUM SERPL-SCNC: 132 MMOL/L (ref 136–145)
SODIUM SERPL-SCNC: 133 MMOL/L (ref 136–145)
WBC # BLD AUTO: 10.2 K/UL (ref 4–11)

## 2024-11-04 PROCEDURE — 36592 COLLECT BLOOD FROM PICC: CPT

## 2024-11-04 PROCEDURE — 6360000002 HC RX W HCPCS: Performed by: INTERNAL MEDICINE

## 2024-11-04 PROCEDURE — 85025 COMPLETE CBC W/AUTO DIFF WBC: CPT

## 2024-11-04 PROCEDURE — 2500000003 HC RX 250 WO HCPCS: Performed by: INTERNAL MEDICINE

## 2024-11-04 PROCEDURE — 2000000000 HC ICU R&B

## 2024-11-04 PROCEDURE — 6370000000 HC RX 637 (ALT 250 FOR IP): Performed by: FAMILY MEDICINE

## 2024-11-04 PROCEDURE — 80076 HEPATIC FUNCTION PANEL: CPT

## 2024-11-04 PROCEDURE — 2580000003 HC RX 258: Performed by: REGISTERED NURSE

## 2024-11-04 PROCEDURE — 2700000000 HC OXYGEN THERAPY PER DAY

## 2024-11-04 PROCEDURE — 86701 HIV-1ANTIBODY: CPT

## 2024-11-04 PROCEDURE — 94640 AIRWAY INHALATION TREATMENT: CPT

## 2024-11-04 PROCEDURE — 6360000002 HC RX W HCPCS: Performed by: FAMILY MEDICINE

## 2024-11-04 PROCEDURE — 86702 HIV-2 ANTIBODY: CPT

## 2024-11-04 PROCEDURE — 80069 RENAL FUNCTION PANEL: CPT

## 2024-11-04 PROCEDURE — 87390 HIV-1 AG IA: CPT

## 2024-11-04 PROCEDURE — 84100 ASSAY OF PHOSPHORUS: CPT

## 2024-11-04 PROCEDURE — APPNB15 APP NON BILLABLE TIME 0-15 MINS: Performed by: NURSE PRACTITIONER

## 2024-11-04 PROCEDURE — 83735 ASSAY OF MAGNESIUM: CPT

## 2024-11-04 PROCEDURE — 6370000000 HC RX 637 (ALT 250 FOR IP): Performed by: REGISTERED NURSE

## 2024-11-04 PROCEDURE — 99291 CRITICAL CARE FIRST HOUR: CPT | Performed by: INTERNAL MEDICINE

## 2024-11-04 PROCEDURE — 85652 RBC SED RATE AUTOMATED: CPT

## 2024-11-04 PROCEDURE — 6360000002 HC RX W HCPCS: Performed by: STUDENT IN AN ORGANIZED HEALTH CARE EDUCATION/TRAINING PROGRAM

## 2024-11-04 PROCEDURE — 86140 C-REACTIVE PROTEIN: CPT

## 2024-11-04 PROCEDURE — 9990000010 HC NO CHARGE VISIT

## 2024-11-04 PROCEDURE — 71045 X-RAY EXAM CHEST 1 VIEW: CPT

## 2024-11-04 PROCEDURE — 6360000002 HC RX W HCPCS: Performed by: NURSE PRACTITIONER

## 2024-11-04 PROCEDURE — 6370000000 HC RX 637 (ALT 250 FOR IP): Performed by: INTERNAL MEDICINE

## 2024-11-04 PROCEDURE — 85520 HEPARIN ASSAY: CPT

## 2024-11-04 PROCEDURE — 2580000003 HC RX 258: Performed by: FAMILY MEDICINE

## 2024-11-04 PROCEDURE — 6360000002 HC RX W HCPCS

## 2024-11-04 RX ORDER — METRONIDAZOLE 500 MG/100ML
500 INJECTION, SOLUTION INTRAVENOUS EVERY 8 HOURS
Status: DISCONTINUED | OUTPATIENT
Start: 2024-11-05 | End: 2024-11-07 | Stop reason: HOSPADM

## 2024-11-04 RX ORDER — CASTOR OIL AND BALSAM, PERU 788; 87 MG/G; MG/G
OINTMENT TOPICAL 2 TIMES DAILY
Status: DISCONTINUED | OUTPATIENT
Start: 2024-11-04 | End: 2024-11-04 | Stop reason: SDUPTHER

## 2024-11-04 RX ORDER — CALCIUM GLUCONATE 20 MG/ML
1000 INJECTION, SOLUTION INTRAVENOUS ONCE
Status: COMPLETED | OUTPATIENT
Start: 2024-11-04 | End: 2024-11-04

## 2024-11-04 RX ORDER — CASTOR OIL AND BALSAM, PERU 788; 87 MG/G; MG/G
OINTMENT TOPICAL 2 TIMES DAILY
Status: DISCONTINUED | OUTPATIENT
Start: 2024-11-04 | End: 2024-11-07 | Stop reason: HOSPADM

## 2024-11-04 RX ORDER — INSULIN GLARGINE 100 [IU]/ML
5 INJECTION, SOLUTION SUBCUTANEOUS ONCE
Status: COMPLETED | OUTPATIENT
Start: 2024-11-04 | End: 2024-11-04

## 2024-11-04 RX ADMIN — POTASSIUM CHLORIDE 20 MEQ: 29.8 INJECTION, SOLUTION INTRAVENOUS at 05:50

## 2024-11-04 RX ADMIN — NOREPINEPHRINE BITARTRATE 18 MCG/MIN: 64 SOLUTION INTRAVENOUS at 14:06

## 2024-11-04 RX ADMIN — IPRATROPIUM BROMIDE AND ALBUTEROL SULFATE 1 DOSE: .5; 2.5 SOLUTION RESPIRATORY (INHALATION) at 16:38

## 2024-11-04 RX ADMIN — Medication: at 19:26

## 2024-11-04 RX ADMIN — SODIUM CHLORIDE, PRESERVATIVE FREE 10 ML: 5 INJECTION INTRAVENOUS at 19:30

## 2024-11-04 RX ADMIN — CEFEPIME 2000 MG: 2 INJECTION, POWDER, FOR SOLUTION INTRAVENOUS at 12:51

## 2024-11-04 RX ADMIN — HEPARIN SODIUM 1660 UNITS/HR: 10000 INJECTION, SOLUTION INTRAVENOUS at 02:00

## 2024-11-04 RX ADMIN — LINEZOLID 600 MG: 600 INJECTION, SOLUTION INTRAVENOUS at 12:55

## 2024-11-04 RX ADMIN — TAMSULOSIN HYDROCHLORIDE 0.4 MG: 0.4 CAPSULE ORAL at 08:43

## 2024-11-04 RX ADMIN — Medication: at 14:22

## 2024-11-04 RX ADMIN — BUPROPION HYDROCHLORIDE 100 MG: 100 TABLET, FILM COATED, EXTENDED RELEASE ORAL at 08:43

## 2024-11-04 RX ADMIN — NOREPINEPHRINE BITARTRATE 30 MCG/MIN: 64 SOLUTION INTRAVENOUS at 03:57

## 2024-11-04 RX ADMIN — AMIODARONE HYDROCHLORIDE 0.5 MG/MIN: 1.8 INJECTION, SOLUTION INTRAVENOUS at 03:56

## 2024-11-04 RX ADMIN — CALCIUM GLUCONATE 1000 MG: 20 INJECTION, SOLUTION INTRAVENOUS at 10:06

## 2024-11-04 RX ADMIN — Medication 100 MG: at 08:43

## 2024-11-04 RX ADMIN — Medication 5000 UNITS: at 08:43

## 2024-11-04 RX ADMIN — BUDESONIDE AND FORMOTEROL FUMARATE DIHYDRATE 2 PUFF: 160; 4.5 AEROSOL RESPIRATORY (INHALATION) at 08:33

## 2024-11-04 RX ADMIN — CEFEPIME 2000 MG: 2 INJECTION, POWDER, FOR SOLUTION INTRAVENOUS at 21:24

## 2024-11-04 RX ADMIN — LINEZOLID 600 MG: 600 INJECTION, SOLUTION INTRAVENOUS at 00:51

## 2024-11-04 RX ADMIN — FUROSEMIDE 40 MG: 10 INJECTION, SOLUTION INTRAMUSCULAR; INTRAVENOUS at 18:54

## 2024-11-04 RX ADMIN — QUETIAPINE FUMARATE 400 MG: 100 TABLET ORAL at 20:12

## 2024-11-04 RX ADMIN — POTASSIUM CHLORIDE 20 MEQ: 29.8 INJECTION, SOLUTION INTRAVENOUS at 14:12

## 2024-11-04 RX ADMIN — PRIMIDONE 50 MG: 50 TABLET ORAL at 08:43

## 2024-11-04 RX ADMIN — CEFEPIME 2000 MG: 2 INJECTION, POWDER, FOR SOLUTION INTRAVENOUS at 05:53

## 2024-11-04 RX ADMIN — MAGNESIUM SULFATE HEPTAHYDRATE 2000 MG: 40 INJECTION, SOLUTION INTRAVENOUS at 09:16

## 2024-11-04 RX ADMIN — AMIODARONE HYDROCHLORIDE 0.5 MG/MIN: 1.8 INJECTION, SOLUTION INTRAVENOUS at 16:00

## 2024-11-04 RX ADMIN — MILRINONE LACTATE 0.25 MCG/KG/MIN: 0.2 INJECTION, SOLUTION INTRAVENOUS at 12:57

## 2024-11-04 RX ADMIN — BUDESONIDE AND FORMOTEROL FUMARATE DIHYDRATE 2 PUFF: 160; 4.5 AEROSOL RESPIRATORY (INHALATION) at 20:26

## 2024-11-04 RX ADMIN — SODIUM CHLORIDE, PRESERVATIVE FREE 10 ML: 5 INJECTION INTRAVENOUS at 08:43

## 2024-11-04 RX ADMIN — BUPROPION HYDROCHLORIDE 100 MG: 100 TABLET, FILM COATED, EXTENDED RELEASE ORAL at 20:12

## 2024-11-04 RX ADMIN — ASPIRIN 81 MG: 81 TABLET, COATED ORAL at 08:43

## 2024-11-04 RX ADMIN — IPRATROPIUM BROMIDE AND ALBUTEROL SULFATE 1 DOSE: .5; 2.5 SOLUTION RESPIRATORY (INHALATION) at 08:32

## 2024-11-04 RX ADMIN — FUROSEMIDE 40 MG: 10 INJECTION, SOLUTION INTRAMUSCULAR; INTRAVENOUS at 08:43

## 2024-11-04 RX ADMIN — POTASSIUM CHLORIDE 20 MEQ: 29.8 INJECTION, SOLUTION INTRAVENOUS at 06:57

## 2024-11-04 RX ADMIN — HEPARIN SODIUM 1660 UNITS/HR: 10000 INJECTION, SOLUTION INTRAVENOUS at 15:54

## 2024-11-04 RX ADMIN — POTASSIUM CHLORIDE 20 MEQ: 29.8 INJECTION, SOLUTION INTRAVENOUS at 08:00

## 2024-11-04 RX ADMIN — POTASSIUM CHLORIDE 20 MEQ: 29.8 INJECTION, SOLUTION INTRAVENOUS at 15:53

## 2024-11-04 RX ADMIN — INSULIN LISPRO 4 UNITS: 100 INJECTION, SOLUTION INTRAVENOUS; SUBCUTANEOUS at 16:14

## 2024-11-04 RX ADMIN — INSULIN GLARGINE 5 UNITS: 100 INJECTION, SOLUTION SUBCUTANEOUS at 09:06

## 2024-11-04 RX ADMIN — INSULIN LISPRO 4 UNITS: 100 INJECTION, SOLUTION INTRAVENOUS; SUBCUTANEOUS at 20:12

## 2024-11-04 RX ADMIN — TIOTROPIUM BROMIDE INHALATION SPRAY 2 PUFF: 3.12 SPRAY, METERED RESPIRATORY (INHALATION) at 08:33

## 2024-11-04 RX ADMIN — IPRATROPIUM BROMIDE AND ALBUTEROL SULFATE 1 DOSE: .5; 2.5 SOLUTION RESPIRATORY (INHALATION) at 20:26

## 2024-11-04 ASSESSMENT — PAIN SCALES - GENERAL
PAINLEVEL_OUTOF10: 0

## 2024-11-04 NOTE — CARE COORDINATION
Wound care consulted for L foot wound. Upon assessment pt refused to turn. Appears based on imaging patient with DTI POA to sacral area. Bedside RN to add LDA.   No wounds noted to bilateral feet or lower legs. Scattered scabs, dry/flaky skin with mild odor from poor hygiene.  Would recommend venelex to buttock area, blue lotion to BLE/feet. Will continue to follow.  Electronically signed by Camille Modi RN CWOCN on 11/4/2024 at 12:32 PM

## 2024-11-04 NOTE — CARE COORDINATION
Case Management Assessment  Initial Evaluation    Date/Time of Evaluation: 11/4/2024 3:36 PM  Assessment Completed by: Deepti Menjivar RN    If patient is discharged prior to next notation, then this note serves as note for discharge by case management.    Patient Name: Don Tee                   YOB: 1955  Diagnosis: Pleural effusion [J90]  Atrial fibrillation with RVR (HCC) [I48.91]  DKA, type 2, not at goal (HCC) [E11.10]  Acute respiratory failure with hypoxia [J96.01]  Diabetic ketoacidosis without coma associated with type 2 diabetes mellitus (HCC) [E11.10]  Acute pulmonary embolism with acute cor pulmonale, unspecified pulmonary embolism type (HCC) [I26.09]  Elevated liver transaminase level [R74.01]                   Date / Time: 11/1/2024  3:29 PM    Patient Admission Status: Inpatient   Readmission Risk (Low < 19, Mod (19-27), High > 27): Readmission Risk Score: 18.2    Current PCP: Omid Barry, YULIA - CNP  PCP verified by CM? Yes    Chart Reviewed: Yes      History Provided by: Patient  Patient Orientation: Alert and Oriented    Patient Cognition: Alert    Hospitalization in the last 30 days (Readmission):  No    If yes, Readmission Assessment in CM Navigator will be completed.    Advance Directives:      Code Status: DNR-CC   Patient's Primary Decision Maker is: Patient Declined (Legal Next of Kin Remains as Decision Maker) (Does not have any family, neighbor is his primary decision maker)    Primary Decision Maker: Lyndsey Jensen  [place] - [phone]    Discharge Planning:    Patient lives with: Alone Type of Home: House  Primary Care Giver: Self  Patient Support Systems include: Friends/Neighbors   Current Financial resources: Medicaid, Medicare  Current community resources: ECF/Home Care  Current services prior to admission: Home Care, Durable Medical Equipment, FRANCINE/Passport (Active w/[facility])            Current DME: Other (Comment) (Ramp, scooter)            Type of Home Care

## 2024-11-04 NOTE — DISCHARGE INSTRUCTIONS
Extra Heart Failure Education/ Tools/ Resources:     https://[employer].com/[email or web address]=697780   --- this is American Heart Association interactive Healthier Living with Heart Failure guidebook.  Please click hyperlink or copy / paste link into search bar. The QR Code is also available below. Use your mouse to scroll through the pages.  Lots of information about weight monitoring, diet tips, activity, meds, etc    Heart Failure Tools and Resources QR Code is below. It includes multiple resources to include symptom tracker, med tracker, further HF info, and access to a HF Support Network online Community    HF Allen Yossi  -- this is a free smart phone yossi available for iPhone and Android download.  Use your phone to track sodium / fluid intake, zone tool symptom tracking, weights, medications, etc. Click on this hyperlink  HF Allen Yossi   for QR code for easy download or the link is also found in the below HF Tools and Resources.      DASH (Dietary Approach to Stop Hypertension) diet --  https://www.nhlbi.nih.gov/education/dash-eating-plan -- this diet is a flexible eating plan that promotes heart healthy eating style.  Click on hyperlink or copy / paste link into search bar.  Lots of low sodium recipes and tips.    https://www.[employer].StyleTech/recipes  -- more free recipes

## 2024-11-04 NOTE — CONSULTS
Cardiac Electrophysiology Consultation   Date: 11/2/2024  Admit Date:  11/1/2024  Reason for Consultation: atrial fibrillation with RVR  Consult Requesting Physician: Heidy Osman MD     Chief Complaint   Patient presents with    Shortness of Breath    Hyperglycemia     Pt to ED via [facility] d/t sob and hyperglycemia. Per EMS report, pt has a home health nurse that performs dressing changes to left foot. Home health nurse called 911 d/t pt blood glucose 460 along with pt c/o sob and low o2 sats. When EMS arrived, o2 sats 70% on ra. EMS placed pt on non-rebreather mask with o2 sats increasing to 92%.      HPI: Don Tee is a 68 y.o. male with hx noted below brought in from home after home heatlh nurse noted the patient to be hypoxic. She was dressing his wound on his left foot. Pt was also noted to be hyperglycemic.     In the ED he was noted to be hypoxemic and in DKA    Imaging showed right PE and pleural effusions.     EP consulted for atrial fibrillation with RVR that was noted on EKG. Initial ECG was sinus tachycardia then atrial fibrillation with RVR requiring DCCV. Overnight he had an episode of atrial fibrillation for 3 hours.     He reports ongoing fatigue. Echo shows severely reduced LV systolic function.      Past Medical History:   Diagnosis Date    Allergic rhinitis     arthri     Arthritis     lumbar spine    CAD (coronary artery disease)     Chronic kidney disease     retention, bph    Constipation     DM (diabetes mellitus) (HCC)     Generalized pain     GERD (gastroesophageal reflux disease)     constipation    Heart attack (HCC)     Heart disease     Hyperlipidemia     Movement disorder     lumbar spine arthritis    Psychiatric problem     ptsd, anxiety, depression    Urinary retention         Past Surgical History:   Procedure Laterality Date    BACK SURGERY      lamenectomy 1996    CARDIAC SURGERY      stents 2006 & 2007    CORONARY ANGIOPLASTY WITH STENT PLACEMENT  2007 
  HOC    LVM for BURTON Solorio. Checked in with Patria JOE to update.   Laura would like a call tomorrow to set up meeting with HOC, unable to meet today.       Thank you for this referral,   Please call [facility] with questions/ concerns at [phone]  Brandie Baker RN   HOC Admissions Liaison     
Consult completed 11/2 by Dr. Torres  
foot ulcer also.  Left chest tube was placed for a large left pleural effusion that is improved.  Nephrology was consulted because of GABRIELLE      Review of Systems:   As above.     PMH/SH/FH:    Medical Hx: reviewed and updated as appropriate  Past Medical History:   Diagnosis Date    Allergic rhinitis     arthri     Arthritis     lumbar spine    CAD (coronary artery disease)     Chronic kidney disease     retention, bph    Constipation     DM (diabetes mellitus) (HCC)     Generalized pain     GERD (gastroesophageal reflux disease)     constipation    Heart attack (HCC)     Heart disease     Hyperlipidemia     Movement disorder     lumbar spine arthritis    Psychiatric problem     ptsd, anxiety, depression    Urinary retention          Surgical Hx: reviewed and updated as appropriate   has a past surgical history that includes Full thickness skin graft (); Coronary angioplasty with stent (); Cardiac surgery; and back surgery.     Social Hx: reviewed and updated as appropriate  Social History     Tobacco Use    Smoking status: Former     Current packs/day: 0.00     Average packs/day: 2.0 packs/day for 41.0 years (82.0 ttl pk-yrs)     Types: E-Cigarettes, Cigarettes     Start date: 2014     Quit date: 2018     Years since quittin.8    Smokeless tobacco: Never    Tobacco comments:     using E-cig with nicotine.  Last cigarette 18.   Substance Use Topics    Alcohol use: Yes        Family hx: reviewed and updated as appropriate  family history includes Cancer in his mother; Diabetes in his brother, maternal uncle, and paternal aunt; Heart Disease in his father; Miscarriages / Stillbirths in his mother.    Medications:   linezolid, 600 mg, Q12H  insulin lispro, 0-4 Units, 4x Daily AC & HS  buPROPion, 100 mg, BID  [Held by provider] empagliflozin, 10 mg, Daily  insulin glargine, 20 Units, QAM  primidone, 50 mg, Daily  QUEtiapine, 400 mg, Nightly  tamsulosin, 0.4 mg, Daily  vitamin B-1, 100 mg, 
for DKA and was hyperkalemic initially.  Blood sugars were over 400.  He has been having a chronic left foot ulcer also.  Left chest tube was placed for a large left pleural effusion that is improved.  Nephrology was consulted because of GABRIELLE      Review of Systems:   As above.     PMH/SH/FH:    Medical Hx: reviewed and updated as appropriate  Past Medical History:   Diagnosis Date    Allergic rhinitis     arthri     Arthritis     lumbar spine    CAD (coronary artery disease)     Chronic kidney disease     retention, bph    Constipation     DM (diabetes mellitus) (HCC)     Generalized pain     GERD (gastroesophageal reflux disease)     constipation    Heart attack (HCC)     Heart disease     Hyperlipidemia     Movement disorder     lumbar spine arthritis    Psychiatric problem     ptsd, anxiety, depression    Urinary retention          Surgical Hx: reviewed and updated as appropriate   has a past surgical history that includes Full thickness skin graft (); Coronary angioplasty with stent (); Cardiac surgery; and back surgery.     Social Hx: reviewed and updated as appropriate  Social History     Tobacco Use    Smoking status: Former     Current packs/day: 0.00     Average packs/day: 2.0 packs/day for 41.0 years (82.0 ttl pk-yrs)     Types: E-Cigarettes, Cigarettes     Start date: 2014     Quit date: 2018     Years since quittin.8    Smokeless tobacco: Never    Tobacco comments:     using E-cig with nicotine.  Last cigarette 18.   Substance Use Topics    Alcohol use: Yes        Family hx: reviewed and updated as appropriate  family history includes Cancer in his mother; Diabetes in his brother, maternal uncle, and paternal aunt; Heart Disease in his father; Miscarriages / Stillbirths in his mother.    Medications:   calcium gluconate-NaCl, 1,000 mg, Once  insulin lispro, 0-16 Units, 4x Daily AC & HS  linezolid, 600 mg, Q12H  buPROPion, 100 mg, BID  [Held by provider] empagliflozin, 10 mg, 
lumbar spine    CAD (coronary artery disease)     Chronic kidney disease     retention, bph    Constipation     DM (diabetes mellitus) (HCC)     Generalized pain     GERD (gastroesophageal reflux disease)     constipation    Heart attack (HCC)     Heart disease     Hyperlipidemia     Movement disorder     lumbar spine arthritis    Psychiatric problem     ptsd, anxiety, depression    Urinary retention        Past Surgical History:    Past Surgical History:   Procedure Laterality Date    BACK SURGERY      lamenectomy 1996    CARDIAC SURGERY      stents 2006 & 2007    CORONARY ANGIOPLASTY WITH STENT PLACEMENT  2007    SKIN FULL GRAFT  1980    bilateral legs       Current Medications:    Outpatient Medications Marked as Taking for the 11/1/24 encounter (Hospital Encounter)   Medication Sig Dispense Refill    Continuous Glucose Sensor (DEXCOM G7 SENSOR) MISC 1 Device by Does not apply route every 10 days 3 each 3    metoprolol succinate (TOPROL XL) 25 MG extended release tablet Take 1 tablet by mouth daily 30 tablet 3    thiamine mononitrate (THIAMINE) 100 MG tablet Take 1 tablet by mouth daily 90 tablet 0    insulin glargine (BASAGLAR KWIKPEN) 100 UNIT/ML injection pen Inject 20 Units into the skin every morning 30 mL 2    Continuous Glucose  (DEXCOM G7 ) REEMA 1 Device by Does not apply route continuous 1 each 0    metFORMIN (GLUCOPHAGE-XR) 500 MG extended release tablet Take 4 tablets by mouth daily (with breakfast) 360 tablet 3    empagliflozin (JARDIANCE) 10 MG tablet Take 1 tablet by mouth daily 90 tablet 3    insulin aspart (NOVOLOG FLEXPEN) 100 UNIT/ML injection pen 1 unit for every 8 grams of carbs, INJECT UP  UNITS SUBCUTANEOUSLY 3 TIMES A DAY 5 Adjustable Dose Pre-filled Pen Syringe 5    buPROPion (WELLBUTRIN SR) 100 MG extended release tablet Take 1 tablet by mouth 2 times daily 180 tablet 3    potassium chloride (KLOR-CON) 10 MEQ extended release tablet Take 1 tablet by mouth daily 90 
ULTRA STRENGTH) 125 MCG (5000 UT) CAPS capsule Take 1 capsule by mouth daily 2 x a week      Melatonin 10 MG CAPS Take 8 capsules by mouth nightly Pt taking 100 mg at bed time      diphenhydrAMINE (BENADRYL) 25 MG tablet Take 2 tablets by mouth every 3-4 hours as needed for Allergies or Sleep         Objective         BP (!) 98/43   Pulse 84   Temp 98 °F (36.7 °C) (Axillary)   Resp 16   Ht 1.829 m (6')   Wt 63.9 kg (140 lb 14 oz)   SpO2 95%   BMI 19.11 kg/m²     Code Status: DNR-CC    Advanced Directives: completed copy in epic      Assessment        Management and Education    Persons available for education:       [x] Self     [] Caregiver       [] Spouse       [] Other Family Member   []  Other    Spiritual History:  notified: Yes,     Does the patient have a Primary Care Physician?  Yes    Palliative Performance Scale:  60% [] Ambulation reduced; Significant disease; Can't do hobbies/housework; intake normal or reduced; occasional assist; LOC full/confusion  50% [x] Mainly sit/lie; Extensive disease; Can't do any work; Considerable assist; intake normal or reduced; LOC full/confusion  40% [] Mainly in bed; Extensive disease; Mainly assist; intake normal or reduced; occasional assist; LOC full/confusion  30% [] Bed Bound; Extensive disease; Total care; intake reduced; LOC full/confusion  20% [] Bed Bound; Extensive disease; Total care; intake minimal; Drowsy/coma  10% [] Bed Bound; Extensive disease; Total care; Mouth care only; Drowsy/coma  0 [] Death    Level of patient/caregiver understanding able to:       [x] Verbalize Understanding   [] Demonstrate Understanding       [] Teach Back       [] Needs Reinforcement     []  Other:      Teaching Time:  0hours  40 minutes     Plan        Social Service Consult Made:  Yes  Assistance filling out Living Will/HPOA:  No      Discharge Plan:  Education/support to patient  Providing support for coping/adaptation/distress of patient  Clarification of 
evaluation with nonemergent MRI of thoracic  spine.  Findings were discussed with HAMIDA HU at 5:46 pm on 11/1/2024.      CXR PA/LAT: No results found for this or any previous visit.      CXR portable: Results for orders placed during the hospital encounter of 11/01/24    XR CHEST PORTABLE    Narrative  EXAMINATION:  ONE XRAY VIEW OF THE CHEST    11/1/2024 7:40 pm    COMPARISON:  None.    HISTORY:  ORDERING SYSTEM PROVIDED HISTORY: chest tube  TECHNOLOGIST PROVIDED HISTORY:  Reason for exam:->chest tube  Reason for Exam: chest tube    FINDINGS:  Heart size is mildly prominent.  Small left pleural effusion.  Left-sided  chest tube is in place.  No visualized pneumothorax.  No subcutaneous  emphysema.  Mild vascular congestion.  Nonunited left humeral head fracture.  Skin fold at the left chest.    Impression  Left-sided chest tube is in place.  Small left pleural effusion.                     Kelton Kessler MD, M.D.            11/2/2024, 11:50 AM

## 2024-11-05 LAB
ALBUMIN SERPL-MCNC: 2.7 G/DL (ref 3.4–5)
ALBUMIN SERPL-MCNC: 2.7 G/DL (ref 3.4–5)
ALBUMIN SERPL-MCNC: 2.9 G/DL (ref 3.4–5)
ALP SERPL-CCNC: 207 U/L (ref 40–129)
ALT SERPL-CCNC: 1142 U/L (ref 10–40)
ANION GAP SERPL CALCULATED.3IONS-SCNC: 10 MMOL/L (ref 3–16)
ANION GAP SERPL CALCULATED.3IONS-SCNC: 8 MMOL/L (ref 3–16)
ANION GAP SERPL CALCULATED.3IONS-SCNC: 8 MMOL/L (ref 3–16)
ANISOCYTOSIS BLD QL SMEAR: ABNORMAL
ANTI-XA UNFRAC HEPARIN: 0.34 IU/ML (ref 0.3–0.7)
AST SERPL-CCNC: 551 U/L (ref 15–37)
BASOPHILS # BLD: 0 K/UL (ref 0–0.2)
BASOPHILS NFR BLD: 0.2 %
BILIRUB DIRECT SERPL-MCNC: 0.4 MG/DL (ref 0–0.3)
BILIRUB INDIRECT SERPL-MCNC: 0.2 MG/DL (ref 0–1)
BILIRUB SERPL-MCNC: 0.6 MG/DL (ref 0–1)
BUN SERPL-MCNC: 18 MG/DL (ref 7–20)
BUN SERPL-MCNC: 19 MG/DL (ref 7–20)
BUN SERPL-MCNC: 21 MG/DL (ref 7–20)
CALCIUM SERPL-MCNC: 7.5 MG/DL (ref 8.3–10.6)
CALCIUM SERPL-MCNC: 7.6 MG/DL (ref 8.3–10.6)
CALCIUM SERPL-MCNC: 7.8 MG/DL (ref 8.3–10.6)
CHLORIDE SERPL-SCNC: 86 MMOL/L (ref 99–110)
CHLORIDE SERPL-SCNC: 87 MMOL/L (ref 99–110)
CHLORIDE SERPL-SCNC: 91 MMOL/L (ref 99–110)
CO2 SERPL-SCNC: 30 MMOL/L (ref 21–32)
CO2 SERPL-SCNC: 32 MMOL/L (ref 21–32)
CO2 SERPL-SCNC: 32 MMOL/L (ref 21–32)
CREAT SERPL-MCNC: 0.6 MG/DL (ref 0.8–1.3)
CREAT SERPL-MCNC: 0.7 MG/DL (ref 0.8–1.3)
CREAT SERPL-MCNC: 0.8 MG/DL (ref 0.8–1.3)
DEPRECATED RDW RBC AUTO: 15.7 % (ref 12.4–15.4)
EKG ATRIAL RATE: 82 BPM
EKG DIAGNOSIS: NORMAL
EKG P AXIS: 66 DEGREES
EKG P-R INTERVAL: 192 MS
EKG Q-T INTERVAL: 400 MS
EKG QRS DURATION: 96 MS
EKG QTC CALCULATION (BAZETT): 468 MS
EKG R AXIS: -59 DEGREES
EKG T AXIS: 78 DEGREES
EKG VENTRICULAR RATE: 82 BPM
EOSINOPHIL # BLD: 0.1 K/UL (ref 0–0.6)
EOSINOPHIL NFR BLD: 0.9 %
GFR SERPLBLD CREATININE-BSD FMLA CKD-EPI: >90 ML/MIN/{1.73_M2}
GLUCOSE BLD-MCNC: 196 MG/DL (ref 70–99)
GLUCOSE BLD-MCNC: 227 MG/DL (ref 70–99)
GLUCOSE BLD-MCNC: 264 MG/DL (ref 70–99)
GLUCOSE BLD-MCNC: 311 MG/DL (ref 70–99)
GLUCOSE SERPL-MCNC: 214 MG/DL (ref 70–99)
GLUCOSE SERPL-MCNC: 224 MG/DL (ref 70–99)
GLUCOSE SERPL-MCNC: 268 MG/DL (ref 70–99)
HCT VFR BLD AUTO: 32.8 % (ref 40.5–52.5)
HGB BLD-MCNC: 11.1 G/DL (ref 13.5–17.5)
LYMPHOCYTES # BLD: 1.3 K/UL (ref 1–5.1)
LYMPHOCYTES NFR BLD: 14.2 %
MAGNESIUM SERPL-MCNC: 1.59 MG/DL (ref 1.8–2.4)
MCH RBC QN AUTO: 31 PG (ref 26–34)
MCHC RBC AUTO-ENTMCNC: 33.7 G/DL (ref 31–36)
MCV RBC AUTO: 91.9 FL (ref 80–100)
MONOCYTES # BLD: 0.9 K/UL (ref 0–1.3)
MONOCYTES NFR BLD: 9.6 %
NEUTROPHILS # BLD: 6.9 K/UL (ref 1.7–7.7)
NEUTROPHILS NFR BLD: 75.1 %
OSMOLALITY SERPL: 337 MOSM/KG (ref 280–301)
PERFORMED ON: ABNORMAL
PHOSPHATE SERPL-MCNC: 2.3 MG/DL (ref 2.5–4.9)
PHOSPHATE SERPL-MCNC: 3.1 MG/DL (ref 2.5–4.9)
PHOSPHATE SERPL-MCNC: 3.2 MG/DL (ref 2.5–4.9)
PLATELET # BLD AUTO: 97 K/UL (ref 135–450)
PLATELET BLD QL SMEAR: ABNORMAL
PMV BLD AUTO: 9.2 FL (ref 5–10.5)
POTASSIUM SERPL-SCNC: 3.4 MMOL/L (ref 3.5–5.1)
POTASSIUM SERPL-SCNC: 3.4 MMOL/L (ref 3.5–5.1)
POTASSIUM SERPL-SCNC: 3.8 MMOL/L (ref 3.5–5.1)
PROCALCITONIN SERPL IA-MCNC: 0.21 NG/ML (ref 0–0.15)
PROT SERPL-MCNC: 4.9 G/DL (ref 6.4–8.2)
RBC # BLD AUTO: 3.57 M/UL (ref 4.2–5.9)
SLIDE REVIEW: ABNORMAL
SODIUM SERPL-SCNC: 126 MMOL/L (ref 136–145)
SODIUM SERPL-SCNC: 127 MMOL/L (ref 136–145)
SODIUM SERPL-SCNC: 131 MMOL/L (ref 136–145)
TROPONIN, HIGH SENSITIVITY: 71 NG/L (ref 0–22)
TROPONIN, HIGH SENSITIVITY: 72 NG/L (ref 0–22)
WBC # BLD AUTO: 9.1 K/UL (ref 4–11)

## 2024-11-05 PROCEDURE — 2500000003 HC RX 250 WO HCPCS: Performed by: NURSE PRACTITIONER

## 2024-11-05 PROCEDURE — 80076 HEPATIC FUNCTION PANEL: CPT

## 2024-11-05 PROCEDURE — 6360000002 HC RX W HCPCS: Performed by: STUDENT IN AN ORGANIZED HEALTH CARE EDUCATION/TRAINING PROGRAM

## 2024-11-05 PROCEDURE — 36415 COLL VENOUS BLD VENIPUNCTURE: CPT

## 2024-11-05 PROCEDURE — 94640 AIRWAY INHALATION TREATMENT: CPT

## 2024-11-05 PROCEDURE — 6360000002 HC RX W HCPCS: Performed by: FAMILY MEDICINE

## 2024-11-05 PROCEDURE — 2500000003 HC RX 250 WO HCPCS: Performed by: INTERNAL MEDICINE

## 2024-11-05 PROCEDURE — 2700000000 HC OXYGEN THERAPY PER DAY

## 2024-11-05 PROCEDURE — 85025 COMPLETE CBC W/AUTO DIFF WBC: CPT

## 2024-11-05 PROCEDURE — 6360000002 HC RX W HCPCS

## 2024-11-05 PROCEDURE — 6360000002 HC RX W HCPCS: Performed by: INTERNAL MEDICINE

## 2024-11-05 PROCEDURE — 88112 CYTOPATH CELL ENHANCE TECH: CPT

## 2024-11-05 PROCEDURE — 99291 CRITICAL CARE FIRST HOUR: CPT | Performed by: INTERNAL MEDICINE

## 2024-11-05 PROCEDURE — 88185 FLOWCYTOMETRY/TC ADD-ON: CPT

## 2024-11-05 PROCEDURE — 6370000000 HC RX 637 (ALT 250 FOR IP): Performed by: STUDENT IN AN ORGANIZED HEALTH CARE EDUCATION/TRAINING PROGRAM

## 2024-11-05 PROCEDURE — 6370000000 HC RX 637 (ALT 250 FOR IP): Performed by: REGISTERED NURSE

## 2024-11-05 PROCEDURE — 2580000003 HC RX 258: Performed by: FAMILY MEDICINE

## 2024-11-05 PROCEDURE — 2580000003 HC RX 258: Performed by: INTERNAL MEDICINE

## 2024-11-05 PROCEDURE — 9990000010 HC NO CHARGE VISIT

## 2024-11-05 PROCEDURE — 80069 RENAL FUNCTION PANEL: CPT

## 2024-11-05 PROCEDURE — 88184 FLOWCYTOMETRY/ TC 1 MARKER: CPT

## 2024-11-05 PROCEDURE — 88305 TISSUE EXAM BY PATHOLOGIST: CPT

## 2024-11-05 PROCEDURE — 85520 HEPARIN ASSAY: CPT

## 2024-11-05 PROCEDURE — 99233 SBSQ HOSP IP/OBS HIGH 50: CPT | Performed by: INTERNAL MEDICINE

## 2024-11-05 PROCEDURE — 6370000000 HC RX 637 (ALT 250 FOR IP): Performed by: FAMILY MEDICINE

## 2024-11-05 PROCEDURE — 84145 PROCALCITONIN (PCT): CPT

## 2024-11-05 PROCEDURE — 94761 N-INVAS EAR/PLS OXIMETRY MLT: CPT

## 2024-11-05 PROCEDURE — 84484 ASSAY OF TROPONIN QUANT: CPT

## 2024-11-05 PROCEDURE — APPNB15 APP NON BILLABLE TIME 0-15 MINS: Performed by: NURSE PRACTITIONER

## 2024-11-05 PROCEDURE — 82103 ALPHA-1-ANTITRYPSIN TOTAL: CPT

## 2024-11-05 PROCEDURE — 6370000000 HC RX 637 (ALT 250 FOR IP): Performed by: NURSE PRACTITIONER

## 2024-11-05 PROCEDURE — 82104 ALPHA-1-ANTITRYPSIN PHENO: CPT

## 2024-11-05 PROCEDURE — 6360000002 HC RX W HCPCS: Performed by: NURSE PRACTITIONER

## 2024-11-05 PROCEDURE — 83735 ASSAY OF MAGNESIUM: CPT

## 2024-11-05 PROCEDURE — 2000000000 HC ICU R&B

## 2024-11-05 PROCEDURE — 6370000000 HC RX 637 (ALT 250 FOR IP): Performed by: INTERNAL MEDICINE

## 2024-11-05 PROCEDURE — 2580000003 HC RX 258: Performed by: REGISTERED NURSE

## 2024-11-05 PROCEDURE — 93005 ELECTROCARDIOGRAM TRACING: CPT

## 2024-11-05 RX ORDER — OXYCODONE HYDROCHLORIDE 5 MG/1
5 TABLET ORAL EVERY 4 HOURS PRN
Status: DISCONTINUED | OUTPATIENT
Start: 2024-11-05 | End: 2024-11-07 | Stop reason: HOSPADM

## 2024-11-05 RX ORDER — MORPHINE SULFATE 2 MG/ML
2 INJECTION, SOLUTION INTRAMUSCULAR; INTRAVENOUS EVERY 6 HOURS PRN
Status: DISCONTINUED | OUTPATIENT
Start: 2024-11-05 | End: 2024-11-07 | Stop reason: HOSPADM

## 2024-11-05 RX ORDER — MAGNESIUM SULFATE IN WATER 40 MG/ML
2000 INJECTION, SOLUTION INTRAVENOUS ONCE
Status: DISCONTINUED | OUTPATIENT
Start: 2024-11-05 | End: 2024-11-05

## 2024-11-05 RX ORDER — CALCIUM GLUCONATE 20 MG/ML
1000 INJECTION, SOLUTION INTRAVENOUS ONCE
Status: COMPLETED | OUTPATIENT
Start: 2024-11-05 | End: 2024-11-05

## 2024-11-05 RX ORDER — AMIODARONE HYDROCHLORIDE 200 MG/1
400 TABLET ORAL 2 TIMES DAILY
Status: DISCONTINUED | OUTPATIENT
Start: 2024-11-05 | End: 2024-11-07 | Stop reason: HOSPADM

## 2024-11-05 RX ORDER — NOREPINEPHRINE BITARTRATE 0.06 MG/ML
1-100 INJECTION, SOLUTION INTRAVENOUS CONTINUOUS
Status: DISCONTINUED | OUTPATIENT
Start: 2024-11-05 | End: 2024-11-07 | Stop reason: HOSPADM

## 2024-11-05 RX ADMIN — Medication: at 13:14

## 2024-11-05 RX ADMIN — CALCIUM GLUCONATE 1000 MG: 20 INJECTION, SOLUTION INTRAVENOUS at 12:39

## 2024-11-05 RX ADMIN — INSULIN LISPRO 4 UNITS: 100 INJECTION, SOLUTION INTRAVENOUS; SUBCUTANEOUS at 20:13

## 2024-11-05 RX ADMIN — LINEZOLID 600 MG: 600 INJECTION, SOLUTION INTRAVENOUS at 13:08

## 2024-11-05 RX ADMIN — HEPARIN SODIUM 1660 UNITS/HR: 10000 INJECTION, SOLUTION INTRAVENOUS at 21:32

## 2024-11-05 RX ADMIN — LINEZOLID 600 MG: 600 INJECTION, SOLUTION INTRAVENOUS at 01:36

## 2024-11-05 RX ADMIN — Medication 100 MG: at 09:03

## 2024-11-05 RX ADMIN — SODIUM CHLORIDE, PRESERVATIVE FREE 10 ML: 5 INJECTION INTRAVENOUS at 09:47

## 2024-11-05 RX ADMIN — OXYCODONE 5 MG: 5 TABLET ORAL at 20:41

## 2024-11-05 RX ADMIN — FUROSEMIDE 40 MG: 10 INJECTION, SOLUTION INTRAMUSCULAR; INTRAVENOUS at 09:03

## 2024-11-05 RX ADMIN — IPRATROPIUM BROMIDE AND ALBUTEROL SULFATE 1 DOSE: .5; 2.5 SOLUTION RESPIRATORY (INHALATION) at 16:44

## 2024-11-05 RX ADMIN — BUDESONIDE AND FORMOTEROL FUMARATE DIHYDRATE 2 PUFF: 160; 4.5 AEROSOL RESPIRATORY (INHALATION) at 08:29

## 2024-11-05 RX ADMIN — AMIODARONE HYDROCHLORIDE 400 MG: 200 TABLET ORAL at 15:18

## 2024-11-05 RX ADMIN — AMIODARONE HYDROCHLORIDE 0.5 MG/MIN: 1.8 INJECTION, SOLUTION INTRAVENOUS at 03:30

## 2024-11-05 RX ADMIN — SODIUM PHOSPHATE, MONOBASIC, MONOHYDRATE AND SODIUM PHOSPHATE, DIBASIC, ANHYDROUS 15 MMOL: 142; 276 INJECTION, SOLUTION INTRAVENOUS at 17:14

## 2024-11-05 RX ADMIN — Medication: at 19:57

## 2024-11-05 RX ADMIN — METRONIDAZOLE 500 MG: 500 INJECTION, SOLUTION INTRAVENOUS at 00:19

## 2024-11-05 RX ADMIN — CEFEPIME 2000 MG: 2 INJECTION, POWDER, FOR SOLUTION INTRAVENOUS at 13:12

## 2024-11-05 RX ADMIN — IPRATROPIUM BROMIDE AND ALBUTEROL SULFATE 1 DOSE: .5; 2.5 SOLUTION RESPIRATORY (INHALATION) at 08:29

## 2024-11-05 RX ADMIN — SODIUM CHLORIDE: 9 INJECTION, SOLUTION INTRAVENOUS at 13:10

## 2024-11-05 RX ADMIN — MILRINONE LACTATE 0.25 MCG/KG/MIN: 0.2 INJECTION, SOLUTION INTRAVENOUS at 06:25

## 2024-11-05 RX ADMIN — SODIUM PHOSPHATE, MONOBASIC, MONOHYDRATE AND SODIUM PHOSPHATE, DIBASIC, ANHYDROUS 15 MMOL: 142; 276 INJECTION, SOLUTION INTRAVENOUS at 01:37

## 2024-11-05 RX ADMIN — METRONIDAZOLE 500 MG: 500 INJECTION, SOLUTION INTRAVENOUS at 16:12

## 2024-11-05 RX ADMIN — POTASSIUM CHLORIDE 20 MEQ: 29.8 INJECTION, SOLUTION INTRAVENOUS at 09:25

## 2024-11-05 RX ADMIN — METRONIDAZOLE 500 MG: 500 INJECTION, SOLUTION INTRAVENOUS at 09:20

## 2024-11-05 RX ADMIN — TIOTROPIUM BROMIDE INHALATION SPRAY 2 PUFF: 3.12 SPRAY, METERED RESPIRATORY (INHALATION) at 08:29

## 2024-11-05 RX ADMIN — INSULIN LISPRO 12 UNITS: 100 INJECTION, SOLUTION INTRAVENOUS; SUBCUTANEOUS at 16:55

## 2024-11-05 RX ADMIN — NOREPINEPHRINE BITARTRATE 13 MCG/MIN: 64 SOLUTION INTRAVENOUS at 09:29

## 2024-11-05 RX ADMIN — IPRATROPIUM BROMIDE AND ALBUTEROL SULFATE 1 DOSE: .5; 2.5 SOLUTION RESPIRATORY (INHALATION) at 19:48

## 2024-11-05 RX ADMIN — SODIUM CHLORIDE, PRESERVATIVE FREE 10 ML: 5 INJECTION INTRAVENOUS at 19:57

## 2024-11-05 RX ADMIN — BUPROPION HYDROCHLORIDE 100 MG: 100 TABLET, FILM COATED, EXTENDED RELEASE ORAL at 20:13

## 2024-11-05 RX ADMIN — SODIUM CHLORIDE: 9 INJECTION, SOLUTION INTRAVENOUS at 09:19

## 2024-11-05 RX ADMIN — FUROSEMIDE 40 MG: 10 INJECTION, SOLUTION INTRAMUSCULAR; INTRAVENOUS at 16:56

## 2024-11-05 RX ADMIN — SODIUM CHLORIDE: 9 INJECTION, SOLUTION INTRAVENOUS at 12:52

## 2024-11-05 RX ADMIN — IPRATROPIUM BROMIDE AND ALBUTEROL SULFATE 1 DOSE: .5; 2.5 SOLUTION RESPIRATORY (INHALATION) at 12:07

## 2024-11-05 RX ADMIN — Medication 1 SPRAY: at 21:55

## 2024-11-05 RX ADMIN — INSULIN GLARGINE 20 UNITS: 100 INJECTION, SOLUTION SUBCUTANEOUS at 09:07

## 2024-11-05 RX ADMIN — MAGNESIUM SULFATE HEPTAHYDRATE 2000 MG: 40 INJECTION, SOLUTION INTRAVENOUS at 15:09

## 2024-11-05 RX ADMIN — CEFEPIME 2000 MG: 2 INJECTION, POWDER, FOR SOLUTION INTRAVENOUS at 20:43

## 2024-11-05 RX ADMIN — BUPROPION HYDROCHLORIDE 100 MG: 100 TABLET, FILM COATED, EXTENDED RELEASE ORAL at 09:38

## 2024-11-05 RX ADMIN — INSULIN LISPRO 8 UNITS: 100 INJECTION, SOLUTION INTRAVENOUS; SUBCUTANEOUS at 12:45

## 2024-11-05 RX ADMIN — PRIMIDONE 50 MG: 50 TABLET ORAL at 09:38

## 2024-11-05 RX ADMIN — POTASSIUM CHLORIDE 20 MEQ: 29.8 INJECTION, SOLUTION INTRAVENOUS at 12:38

## 2024-11-05 RX ADMIN — QUETIAPINE FUMARATE 400 MG: 100 TABLET ORAL at 20:13

## 2024-11-05 RX ADMIN — ASPIRIN 81 MG: 81 TABLET, COATED ORAL at 09:03

## 2024-11-05 RX ADMIN — CEFEPIME 2000 MG: 2 INJECTION, POWDER, FOR SOLUTION INTRAVENOUS at 05:00

## 2024-11-05 RX ADMIN — BUDESONIDE AND FORMOTEROL FUMARATE DIHYDRATE 2 PUFF: 160; 4.5 AEROSOL RESPIRATORY (INHALATION) at 19:48

## 2024-11-05 RX ADMIN — INSULIN LISPRO 4 UNITS: 100 INJECTION, SOLUTION INTRAVENOUS; SUBCUTANEOUS at 09:07

## 2024-11-05 RX ADMIN — AMIODARONE HYDROCHLORIDE 400 MG: 200 TABLET ORAL at 20:13

## 2024-11-05 RX ADMIN — HEPARIN SODIUM 1660 UNITS/HR: 10000 INJECTION, SOLUTION INTRAVENOUS at 06:24

## 2024-11-05 RX ADMIN — Medication 13 MCG/MIN: at 09:45

## 2024-11-05 RX ADMIN — TAMSULOSIN HYDROCHLORIDE 0.4 MG: 0.4 CAPSULE ORAL at 09:03

## 2024-11-05 ASSESSMENT — PAIN DESCRIPTION - ORIENTATION
ORIENTATION: LEFT;MID;POSTERIOR
ORIENTATION: LEFT;RIGHT;MID
ORIENTATION: LEFT
ORIENTATION: LEFT;POSTERIOR
ORIENTATION: LEFT;RIGHT;MID

## 2024-11-05 ASSESSMENT — PAIN DESCRIPTION - LOCATION
LOCATION: SHOULDER
LOCATION: CHEST
LOCATION: CHEST;BACK
LOCATION: BUTTOCKS
LOCATION: BUTTOCKS

## 2024-11-05 ASSESSMENT — PAIN SCALES - GENERAL
PAINLEVEL_OUTOF10: 0
PAINLEVEL_OUTOF10: 8
PAINLEVEL_OUTOF10: 1
PAINLEVEL_OUTOF10: 7
PAINLEVEL_OUTOF10: 6
PAINLEVEL_OUTOF10: 1

## 2024-11-05 ASSESSMENT — PAIN DESCRIPTION - FREQUENCY
FREQUENCY: CONTINUOUS
FREQUENCY: CONTINUOUS

## 2024-11-05 ASSESSMENT — PAIN DESCRIPTION - DESCRIPTORS
DESCRIPTORS: ACHING;SPASM
DESCRIPTORS: ACHING

## 2024-11-05 ASSESSMENT — PAIN DESCRIPTION - PAIN TYPE
TYPE: ACUTE PAIN
TYPE: ACUTE PAIN

## 2024-11-05 ASSESSMENT — PAIN - FUNCTIONAL ASSESSMENT
PAIN_FUNCTIONAL_ASSESSMENT: PREVENTS OR INTERFERES WITH ALL ACTIVE AND SOME PASSIVE ACTIVITIES
PAIN_FUNCTIONAL_ASSESSMENT: PREVENTS OR INTERFERES WITH ALL ACTIVE AND SOME PASSIVE ACTIVITIES

## 2024-11-06 ENCOUNTER — APPOINTMENT (OUTPATIENT)
Dept: GENERAL RADIOLOGY | Age: 69
DRG: 871 | End: 2024-11-06
Payer: MEDICARE

## 2024-11-06 LAB
ALBUMIN SERPL-MCNC: 2.7 G/DL (ref 3.4–5)
ANION GAP SERPL CALCULATED.3IONS-SCNC: 7 MMOL/L (ref 3–16)
ANTI-XA UNFRAC HEPARIN: 0.24 IU/ML (ref 0.3–0.7)
ANTI-XA UNFRAC HEPARIN: 0.44 IU/ML (ref 0.3–0.7)
ANTI-XA UNFRAC HEPARIN: 0.56 IU/ML (ref 0.3–0.7)
BACTERIA BLD CULT ORG #2: NORMAL
BACTERIA BLD CULT: NORMAL
BUN SERPL-MCNC: 19 MG/DL (ref 7–20)
CALCIUM SERPL-MCNC: 7.8 MG/DL (ref 8.3–10.6)
CHLORIDE SERPL-SCNC: 88 MMOL/L (ref 99–110)
CO2 SERPL-SCNC: 33 MMOL/L (ref 21–32)
CREAT SERPL-MCNC: 0.7 MG/DL (ref 0.8–1.3)
GFR SERPLBLD CREATININE-BSD FMLA CKD-EPI: >90 ML/MIN/{1.73_M2}
GLUCOSE BLD-MCNC: 184 MG/DL (ref 70–99)
GLUCOSE BLD-MCNC: 222 MG/DL (ref 70–99)
GLUCOSE BLD-MCNC: 233 MG/DL (ref 70–99)
GLUCOSE BLD-MCNC: 264 MG/DL (ref 70–99)
GLUCOSE SERPL-MCNC: 193 MG/DL (ref 70–99)
HIV 1+2 AB+HIV1 P24 AG SERPL QL IA: NORMAL
HIV 2 AB SERPL QL IA: NORMAL
HIV1 AB SERPL QL IA: NORMAL
HIV1 P24 AG SERPL QL IA: NORMAL
MAGNESIUM SERPL-MCNC: 1.78 MG/DL (ref 1.8–2.4)
PERFORMED ON: ABNORMAL
PHOSPHATE SERPL-MCNC: 2.4 MG/DL (ref 2.5–4.9)
POTASSIUM SERPL-SCNC: 4.3 MMOL/L (ref 3.5–5.1)
SODIUM SERPL-SCNC: 128 MMOL/L (ref 136–145)

## 2024-11-06 PROCEDURE — 6360000002 HC RX W HCPCS: Performed by: INTERNAL MEDICINE

## 2024-11-06 PROCEDURE — 9990000010 HC NO CHARGE VISIT

## 2024-11-06 PROCEDURE — 6370000000 HC RX 637 (ALT 250 FOR IP): Performed by: REGISTERED NURSE

## 2024-11-06 PROCEDURE — 6370000000 HC RX 637 (ALT 250 FOR IP): Performed by: STUDENT IN AN ORGANIZED HEALTH CARE EDUCATION/TRAINING PROGRAM

## 2024-11-06 PROCEDURE — APPNB15 APP NON BILLABLE TIME 0-15 MINS: Performed by: NURSE PRACTITIONER

## 2024-11-06 PROCEDURE — 6360000002 HC RX W HCPCS

## 2024-11-06 PROCEDURE — 99291 CRITICAL CARE FIRST HOUR: CPT | Performed by: INTERNAL MEDICINE

## 2024-11-06 PROCEDURE — 2500000003 HC RX 250 WO HCPCS: Performed by: INTERNAL MEDICINE

## 2024-11-06 PROCEDURE — 80069 RENAL FUNCTION PANEL: CPT

## 2024-11-06 PROCEDURE — 6370000000 HC RX 637 (ALT 250 FOR IP): Performed by: NURSE PRACTITIONER

## 2024-11-06 PROCEDURE — 6360000002 HC RX W HCPCS: Performed by: NURSE PRACTITIONER

## 2024-11-06 PROCEDURE — 2000000000 HC ICU R&B

## 2024-11-06 PROCEDURE — 85520 HEPARIN ASSAY: CPT

## 2024-11-06 PROCEDURE — 6370000000 HC RX 637 (ALT 250 FOR IP): Performed by: INTERNAL MEDICINE

## 2024-11-06 PROCEDURE — 2580000003 HC RX 258: Performed by: FAMILY MEDICINE

## 2024-11-06 PROCEDURE — 94761 N-INVAS EAR/PLS OXIMETRY MLT: CPT

## 2024-11-06 PROCEDURE — 71045 X-RAY EXAM CHEST 1 VIEW: CPT

## 2024-11-06 PROCEDURE — 2580000003 HC RX 258: Performed by: INTERNAL MEDICINE

## 2024-11-06 PROCEDURE — 6360000002 HC RX W HCPCS: Performed by: HOSPITALIST

## 2024-11-06 PROCEDURE — 2580000003 HC RX 258: Performed by: REGISTERED NURSE

## 2024-11-06 PROCEDURE — 94640 AIRWAY INHALATION TREATMENT: CPT

## 2024-11-06 PROCEDURE — 2700000000 HC OXYGEN THERAPY PER DAY

## 2024-11-06 PROCEDURE — 99233 SBSQ HOSP IP/OBS HIGH 50: CPT | Performed by: INTERNAL MEDICINE

## 2024-11-06 PROCEDURE — 6360000002 HC RX W HCPCS: Performed by: FAMILY MEDICINE

## 2024-11-06 PROCEDURE — 6360000002 HC RX W HCPCS: Performed by: STUDENT IN AN ORGANIZED HEALTH CARE EDUCATION/TRAINING PROGRAM

## 2024-11-06 PROCEDURE — 6370000000 HC RX 637 (ALT 250 FOR IP): Performed by: FAMILY MEDICINE

## 2024-11-06 PROCEDURE — 83735 ASSAY OF MAGNESIUM: CPT

## 2024-11-06 RX ORDER — INSULIN GLARGINE 100 [IU]/ML
30 INJECTION, SOLUTION SUBCUTANEOUS EVERY MORNING
Status: DISCONTINUED | OUTPATIENT
Start: 2024-11-06 | End: 2024-11-07 | Stop reason: HOSPADM

## 2024-11-06 RX ORDER — MAGNESIUM SULFATE IN WATER 40 MG/ML
2000 INJECTION, SOLUTION INTRAVENOUS ONCE
Status: COMPLETED | OUTPATIENT
Start: 2024-11-06 | End: 2024-11-06

## 2024-11-06 RX ORDER — MIDODRINE HYDROCHLORIDE 5 MG/1
5 TABLET ORAL
Status: DISCONTINUED | OUTPATIENT
Start: 2024-11-06 | End: 2024-11-07

## 2024-11-06 RX ORDER — TAMSULOSIN HYDROCHLORIDE 0.4 MG/1
0.4 CAPSULE ORAL NIGHTLY
Status: DISCONTINUED | OUTPATIENT
Start: 2024-11-07 | End: 2024-11-07 | Stop reason: HOSPADM

## 2024-11-06 RX ORDER — IPRATROPIUM BROMIDE AND ALBUTEROL SULFATE 2.5; .5 MG/3ML; MG/3ML
1 SOLUTION RESPIRATORY (INHALATION) EVERY 4 HOURS PRN
Status: DISCONTINUED | OUTPATIENT
Start: 2024-11-06 | End: 2024-11-07 | Stop reason: HOSPADM

## 2024-11-06 RX ORDER — HEPARIN SODIUM 1000 [USP'U]/ML
2000 INJECTION, SOLUTION INTRAVENOUS; SUBCUTANEOUS ONCE
Status: COMPLETED | OUTPATIENT
Start: 2024-11-06 | End: 2024-11-06

## 2024-11-06 RX ORDER — CALCIUM GLUCONATE 20 MG/ML
1000 INJECTION, SOLUTION INTRAVENOUS ONCE
Status: COMPLETED | OUTPATIENT
Start: 2024-11-06 | End: 2024-11-06

## 2024-11-06 RX ADMIN — INSULIN LISPRO 4 UNITS: 100 INJECTION, SOLUTION INTRAVENOUS; SUBCUTANEOUS at 18:13

## 2024-11-06 RX ADMIN — Medication 100 MG: at 09:09

## 2024-11-06 RX ADMIN — BUPROPION HYDROCHLORIDE 100 MG: 100 TABLET, FILM COATED, EXTENDED RELEASE ORAL at 09:39

## 2024-11-06 RX ADMIN — BUPROPION HYDROCHLORIDE 100 MG: 100 TABLET, FILM COATED, EXTENDED RELEASE ORAL at 20:05

## 2024-11-06 RX ADMIN — SODIUM CHLORIDE, PRESERVATIVE FREE 10 ML: 5 INJECTION INTRAVENOUS at 09:44

## 2024-11-06 RX ADMIN — METRONIDAZOLE 500 MG: 500 INJECTION, SOLUTION INTRAVENOUS at 23:47

## 2024-11-06 RX ADMIN — FUROSEMIDE 40 MG: 10 INJECTION, SOLUTION INTRAMUSCULAR; INTRAVENOUS at 18:13

## 2024-11-06 RX ADMIN — MIDODRINE HYDROCHLORIDE 5 MG: 5 TABLET ORAL at 12:16

## 2024-11-06 RX ADMIN — AMIODARONE HYDROCHLORIDE 400 MG: 200 TABLET ORAL at 09:08

## 2024-11-06 RX ADMIN — INSULIN LISPRO 8 UNITS: 100 INJECTION, SOLUTION INTRAVENOUS; SUBCUTANEOUS at 20:05

## 2024-11-06 RX ADMIN — METRONIDAZOLE 500 MG: 500 INJECTION, SOLUTION INTRAVENOUS at 09:05

## 2024-11-06 RX ADMIN — POTASSIUM CHLORIDE 20 MEQ: 29.8 INJECTION, SOLUTION INTRAVENOUS at 00:28

## 2024-11-06 RX ADMIN — PRIMIDONE 50 MG: 50 TABLET ORAL at 09:39

## 2024-11-06 RX ADMIN — CEFEPIME 2000 MG: 2 INJECTION, POWDER, FOR SOLUTION INTRAVENOUS at 13:27

## 2024-11-06 RX ADMIN — ASPIRIN 81 MG: 81 TABLET, COATED ORAL at 09:08

## 2024-11-06 RX ADMIN — QUETIAPINE FUMARATE 400 MG: 100 TABLET ORAL at 20:04

## 2024-11-06 RX ADMIN — MIDODRINE HYDROCHLORIDE 5 MG: 5 TABLET ORAL at 09:23

## 2024-11-06 RX ADMIN — CEFEPIME 2000 MG: 2 INJECTION, POWDER, FOR SOLUTION INTRAVENOUS at 04:38

## 2024-11-06 RX ADMIN — TIOTROPIUM BROMIDE INHALATION SPRAY 2 PUFF: 3.12 SPRAY, METERED RESPIRATORY (INHALATION) at 08:07

## 2024-11-06 RX ADMIN — SODIUM PHOSPHATE, MONOBASIC, MONOHYDRATE AND SODIUM PHOSPHATE, DIBASIC, ANHYDROUS 15 MMOL: 142; 276 INJECTION, SOLUTION INTRAVENOUS at 06:23

## 2024-11-06 RX ADMIN — CALCIUM GLUCONATE 1000 MG: 20 INJECTION, SOLUTION INTRAVENOUS at 09:46

## 2024-11-06 RX ADMIN — CEFEPIME 2000 MG: 2 INJECTION, POWDER, FOR SOLUTION INTRAVENOUS at 21:15

## 2024-11-06 RX ADMIN — METRONIDAZOLE 500 MG: 500 INJECTION, SOLUTION INTRAVENOUS at 17:40

## 2024-11-06 RX ADMIN — MIDODRINE HYDROCHLORIDE 5 MG: 5 TABLET ORAL at 18:13

## 2024-11-06 RX ADMIN — FUROSEMIDE 40 MG: 10 INJECTION, SOLUTION INTRAMUSCULAR; INTRAVENOUS at 09:09

## 2024-11-06 RX ADMIN — INSULIN LISPRO 4 UNITS: 100 INJECTION, SOLUTION INTRAVENOUS; SUBCUTANEOUS at 08:13

## 2024-11-06 RX ADMIN — MAGNESIUM SULFATE HEPTAHYDRATE 2000 MG: 40 INJECTION, SOLUTION INTRAVENOUS at 11:20

## 2024-11-06 RX ADMIN — HEPARIN SODIUM 2000 UNITS: 1000 INJECTION INTRAVENOUS; SUBCUTANEOUS at 05:08

## 2024-11-06 RX ADMIN — SODIUM CHLORIDE, PRESERVATIVE FREE 10 ML: 5 INJECTION INTRAVENOUS at 20:06

## 2024-11-06 RX ADMIN — Medication: at 20:05

## 2024-11-06 RX ADMIN — Medication 1 SPRAY: at 05:12

## 2024-11-06 RX ADMIN — MILRINONE LACTATE 0.25 MCG/KG/MIN: 0.2 INJECTION, SOLUTION INTRAVENOUS at 02:01

## 2024-11-06 RX ADMIN — INSULIN LISPRO 4 UNITS: 100 INJECTION, SOLUTION INTRAVENOUS; SUBCUTANEOUS at 12:17

## 2024-11-06 RX ADMIN — Medication: at 09:41

## 2024-11-06 RX ADMIN — METRONIDAZOLE 500 MG: 500 INJECTION, SOLUTION INTRAVENOUS at 00:25

## 2024-11-06 RX ADMIN — BUDESONIDE AND FORMOTEROL FUMARATE DIHYDRATE 2 PUFF: 160; 4.5 AEROSOL RESPIRATORY (INHALATION) at 20:15

## 2024-11-06 RX ADMIN — HEPARIN SODIUM 1800 UNITS/HR: 10000 INJECTION, SOLUTION INTRAVENOUS at 13:18

## 2024-11-06 RX ADMIN — INSULIN GLARGINE 30 UNITS: 100 INJECTION, SOLUTION SUBCUTANEOUS at 09:09

## 2024-11-06 RX ADMIN — BUDESONIDE AND FORMOTEROL FUMARATE DIHYDRATE 2 PUFF: 160; 4.5 AEROSOL RESPIRATORY (INHALATION) at 08:07

## 2024-11-06 RX ADMIN — IPRATROPIUM BROMIDE AND ALBUTEROL SULFATE 1 DOSE: .5; 2.5 SOLUTION RESPIRATORY (INHALATION) at 08:07

## 2024-11-06 RX ADMIN — AMIODARONE HYDROCHLORIDE 400 MG: 200 TABLET ORAL at 20:05

## 2024-11-06 RX ADMIN — MILRINONE LACTATE 0.25 MCG/KG/MIN: 0.2 INJECTION, SOLUTION INTRAVENOUS at 21:35

## 2024-11-06 RX ADMIN — OXYCODONE 5 MG: 5 TABLET ORAL at 02:01

## 2024-11-06 RX ADMIN — Medication 30 G: at 09:24

## 2024-11-06 RX ADMIN — POTASSIUM CHLORIDE 20 MEQ: 29.8 INJECTION, SOLUTION INTRAVENOUS at 02:00

## 2024-11-06 ASSESSMENT — PAIN SCALES - GENERAL
PAINLEVEL_OUTOF10: 2
PAINLEVEL_OUTOF10: 0
PAINLEVEL_OUTOF10: 5
PAINLEVEL_OUTOF10: 0
PAINLEVEL_OUTOF10: 2

## 2024-11-06 ASSESSMENT — PAIN DESCRIPTION - DESCRIPTORS
DESCRIPTORS: ACHING

## 2024-11-06 ASSESSMENT — PAIN DESCRIPTION - LOCATION
LOCATION: COCCYX
LOCATION: SACRUM
LOCATION: COCCYX

## 2024-11-06 ASSESSMENT — PAIN DESCRIPTION - FREQUENCY
FREQUENCY: CONTINUOUS

## 2024-11-06 ASSESSMENT — PAIN DESCRIPTION - ORIENTATION
ORIENTATION: MID;LOWER
ORIENTATION: MID
ORIENTATION: MID;LOWER

## 2024-11-06 ASSESSMENT — PAIN DESCRIPTION - PAIN TYPE
TYPE: ACUTE PAIN
TYPE: ACUTE PAIN

## 2024-11-06 NOTE — CARE COORDINATION
Discharge Planning:     [facility] met with the patient and the patient's neighbor to discuss hospice at home with Milrinone gtt. Continuing to wean the Levophed gtt. Chest tube remains in place.     Sandi with [facility] on-site today. Received voicemail message stating she had reviewed the chart ad was aware the Levophed was still infusing.     Deepti DEJESUS RN  Case Management  [phone]    Electronically signed by Deepti Menjivar RN on 11/6/2024 at 1:40 PM

## 2024-11-07 VITALS
TEMPERATURE: 98.6 F | DIASTOLIC BLOOD PRESSURE: 44 MMHG | OXYGEN SATURATION: 95 % | BODY MASS INDEX: 19.5 KG/M2 | HEART RATE: 85 BPM | SYSTOLIC BLOOD PRESSURE: 100 MMHG | WEIGHT: 143.96 LBS | RESPIRATION RATE: 16 BRPM | HEIGHT: 72 IN

## 2024-11-07 PROBLEM — E87.1 HYPONATREMIA: Status: ACTIVE | Noted: 2024-11-07

## 2024-11-07 LAB
ALBUMIN SERPL-MCNC: 2.6 G/DL (ref 3.4–5)
ALP SERPL-CCNC: 158 U/L (ref 40–129)
ALT SERPL-CCNC: 600 U/L (ref 10–40)
ANION GAP SERPL CALCULATED.3IONS-SCNC: 8 MMOL/L (ref 3–16)
ANTI-XA UNFRAC HEPARIN: 0.29 IU/ML (ref 0.3–0.7)
ANTI-XA UNFRAC HEPARIN: 0.56 IU/ML (ref 0.3–0.7)
AST SERPL-CCNC: 154 U/L (ref 15–37)
BASOPHILS # BLD: 0.1 K/UL (ref 0–0.2)
BASOPHILS NFR BLD: 1 %
BILIRUB DIRECT SERPL-MCNC: 0.5 MG/DL (ref 0–0.3)
BILIRUB INDIRECT SERPL-MCNC: 0.3 MG/DL (ref 0–1)
BILIRUB SERPL-MCNC: 0.8 MG/DL (ref 0–1)
BUN SERPL-MCNC: 26 MG/DL (ref 7–20)
CALCIUM SERPL-MCNC: 7.9 MG/DL (ref 8.3–10.6)
CHLORIDE SERPL-SCNC: 86 MMOL/L (ref 99–110)
CO2 SERPL-SCNC: 34 MMOL/L (ref 21–32)
CREAT SERPL-MCNC: 0.7 MG/DL (ref 0.8–1.3)
DEPRECATED RDW RBC AUTO: 15.7 % (ref 12.4–15.4)
EOSINOPHIL # BLD: 0.1 K/UL (ref 0–0.6)
EOSINOPHIL NFR BLD: 1 %
GFR SERPLBLD CREATININE-BSD FMLA CKD-EPI: >90 ML/MIN/{1.73_M2}
GLUCOSE BLD-MCNC: 156 MG/DL (ref 70–99)
GLUCOSE BLD-MCNC: 194 MG/DL (ref 70–99)
GLUCOSE BLD-MCNC: 247 MG/DL (ref 70–99)
GLUCOSE SERPL-MCNC: 139 MG/DL (ref 70–99)
HCT VFR BLD AUTO: 30.1 % (ref 40.5–52.5)
HGB BLD-MCNC: 9.8 G/DL (ref 13.5–17.5)
LYMPHOCYTES # BLD: 1.5 K/UL (ref 1–5.1)
LYMPHOCYTES NFR BLD: 15.6 %
MAGNESIUM SERPL-MCNC: 1.85 MG/DL (ref 1.8–2.4)
MCH RBC QN AUTO: 30.1 PG (ref 26–34)
MCHC RBC AUTO-ENTMCNC: 32.6 G/DL (ref 31–36)
MCV RBC AUTO: 92.2 FL (ref 80–100)
MONOCYTES # BLD: 1.1 K/UL (ref 0–1.3)
MONOCYTES NFR BLD: 10.8 %
NEUTROPHILS # BLD: 7 K/UL (ref 1.7–7.7)
NEUTROPHILS NFR BLD: 71.6 %
PERFORMED ON: ABNORMAL
PHOSPHATE SERPL-MCNC: 2.6 MG/DL (ref 2.5–4.9)
PHOSPHATE SERPL-MCNC: 2.6 MG/DL (ref 2.5–4.9)
PLATELET # BLD AUTO: 105 K/UL (ref 135–450)
PMV BLD AUTO: 8.9 FL (ref 5–10.5)
POTASSIUM SERPL-SCNC: 3.8 MMOL/L (ref 3.5–5.1)
PROT SERPL-MCNC: 5.2 G/DL (ref 6.4–8.2)
RBC # BLD AUTO: 3.27 M/UL (ref 4.2–5.9)
SODIUM SERPL-SCNC: 128 MMOL/L (ref 136–145)
WBC # BLD AUTO: 9.8 K/UL (ref 4–11)

## 2024-11-07 PROCEDURE — 6370000000 HC RX 637 (ALT 250 FOR IP): Performed by: INTERNAL MEDICINE

## 2024-11-07 PROCEDURE — 6360000002 HC RX W HCPCS: Performed by: HOSPITALIST

## 2024-11-07 PROCEDURE — 85025 COMPLETE CBC W/AUTO DIFF WBC: CPT

## 2024-11-07 PROCEDURE — 83735 ASSAY OF MAGNESIUM: CPT

## 2024-11-07 PROCEDURE — 6370000000 HC RX 637 (ALT 250 FOR IP): Performed by: STUDENT IN AN ORGANIZED HEALTH CARE EDUCATION/TRAINING PROGRAM

## 2024-11-07 PROCEDURE — 84100 ASSAY OF PHOSPHORUS: CPT

## 2024-11-07 PROCEDURE — 6360000002 HC RX W HCPCS: Performed by: FAMILY MEDICINE

## 2024-11-07 PROCEDURE — 94761 N-INVAS EAR/PLS OXIMETRY MLT: CPT

## 2024-11-07 PROCEDURE — 80076 HEPATIC FUNCTION PANEL: CPT

## 2024-11-07 PROCEDURE — 2580000003 HC RX 258: Performed by: REGISTERED NURSE

## 2024-11-07 PROCEDURE — 36592 COLLECT BLOOD FROM PICC: CPT

## 2024-11-07 PROCEDURE — 2580000003 HC RX 258: Performed by: FAMILY MEDICINE

## 2024-11-07 PROCEDURE — 6360000002 HC RX W HCPCS: Performed by: INTERNAL MEDICINE

## 2024-11-07 PROCEDURE — 99291 CRITICAL CARE FIRST HOUR: CPT | Performed by: NURSE PRACTITIONER

## 2024-11-07 PROCEDURE — 85520 HEPARIN ASSAY: CPT

## 2024-11-07 PROCEDURE — 6370000000 HC RX 637 (ALT 250 FOR IP): Performed by: REGISTERED NURSE

## 2024-11-07 PROCEDURE — 9990000010 HC NO CHARGE VISIT

## 2024-11-07 PROCEDURE — 36415 COLL VENOUS BLD VENIPUNCTURE: CPT

## 2024-11-07 PROCEDURE — 6360000002 HC RX W HCPCS: Performed by: STUDENT IN AN ORGANIZED HEALTH CARE EDUCATION/TRAINING PROGRAM

## 2024-11-07 PROCEDURE — 6370000000 HC RX 637 (ALT 250 FOR IP): Performed by: NURSE PRACTITIONER

## 2024-11-07 PROCEDURE — 80069 RENAL FUNCTION PANEL: CPT

## 2024-11-07 PROCEDURE — 2700000000 HC OXYGEN THERAPY PER DAY

## 2024-11-07 PROCEDURE — 94640 AIRWAY INHALATION TREATMENT: CPT

## 2024-11-07 RX ORDER — MIDODRINE HYDROCHLORIDE 10 MG/1
10 TABLET ORAL
Status: DISCONTINUED | OUTPATIENT
Start: 2024-11-07 | End: 2024-11-07 | Stop reason: HOSPADM

## 2024-11-07 RX ORDER — HEPARIN SODIUM 1000 [USP'U]/ML
2000 INJECTION, SOLUTION INTRAVENOUS; SUBCUTANEOUS ONCE
Status: COMPLETED | OUTPATIENT
Start: 2024-11-07 | End: 2024-11-07

## 2024-11-07 RX ADMIN — CEFEPIME 2000 MG: 2 INJECTION, POWDER, FOR SOLUTION INTRAVENOUS at 04:58

## 2024-11-07 RX ADMIN — MIDODRINE HYDROCHLORIDE 10 MG: 10 TABLET ORAL at 16:34

## 2024-11-07 RX ADMIN — HEPARIN SODIUM 1800 UNITS/HR: 10000 INJECTION, SOLUTION INTRAVENOUS at 03:58

## 2024-11-07 RX ADMIN — METRONIDAZOLE 500 MG: 500 INJECTION, SOLUTION INTRAVENOUS at 16:24

## 2024-11-07 RX ADMIN — TIOTROPIUM BROMIDE INHALATION SPRAY 2 PUFF: 3.12 SPRAY, METERED RESPIRATORY (INHALATION) at 08:11

## 2024-11-07 RX ADMIN — FUROSEMIDE 40 MG: 10 INJECTION, SOLUTION INTRAMUSCULAR; INTRAVENOUS at 09:30

## 2024-11-07 RX ADMIN — BUDESONIDE AND FORMOTEROL FUMARATE DIHYDRATE 2 PUFF: 160; 4.5 AEROSOL RESPIRATORY (INHALATION) at 08:11

## 2024-11-07 RX ADMIN — MIDODRINE HYDROCHLORIDE 5 MG: 5 TABLET ORAL at 08:01

## 2024-11-07 RX ADMIN — SODIUM CHLORIDE, PRESERVATIVE FREE 10 ML: 5 INJECTION INTRAVENOUS at 08:02

## 2024-11-07 RX ADMIN — PRIMIDONE 50 MG: 50 TABLET ORAL at 08:08

## 2024-11-07 RX ADMIN — HEPARIN SODIUM 2000 UNITS: 1000 INJECTION INTRAVENOUS; SUBCUTANEOUS at 04:52

## 2024-11-07 RX ADMIN — AMIODARONE HYDROCHLORIDE 400 MG: 200 TABLET ORAL at 08:02

## 2024-11-07 RX ADMIN — CEFEPIME 2000 MG: 2 INJECTION, POWDER, FOR SOLUTION INTRAVENOUS at 12:15

## 2024-11-07 RX ADMIN — Medication 1 SPRAY: at 08:24

## 2024-11-07 RX ADMIN — Medication 30 G: at 10:42

## 2024-11-07 RX ADMIN — BUPROPION HYDROCHLORIDE 100 MG: 100 TABLET, FILM COATED, EXTENDED RELEASE ORAL at 08:08

## 2024-11-07 RX ADMIN — Medication: at 08:17

## 2024-11-07 RX ADMIN — Medication 100 MG: at 08:02

## 2024-11-07 RX ADMIN — Medication 5000 UNITS: at 08:02

## 2024-11-07 RX ADMIN — METRONIDAZOLE 500 MG: 500 INJECTION, SOLUTION INTRAVENOUS at 08:16

## 2024-11-07 RX ADMIN — INSULIN GLARGINE 30 UNITS: 100 INJECTION, SOLUTION SUBCUTANEOUS at 08:01

## 2024-11-07 RX ADMIN — INSULIN LISPRO 4 UNITS: 100 INJECTION, SOLUTION INTRAVENOUS; SUBCUTANEOUS at 13:34

## 2024-11-07 RX ADMIN — INSULIN LISPRO 4 UNITS: 100 INJECTION, SOLUTION INTRAVENOUS; SUBCUTANEOUS at 17:03

## 2024-11-07 RX ADMIN — MIDODRINE HYDROCHLORIDE 10 MG: 10 TABLET ORAL at 12:13

## 2024-11-07 RX ADMIN — ASPIRIN 81 MG: 81 TABLET, COATED ORAL at 08:01

## 2024-11-07 ASSESSMENT — PAIN SCALES - GENERAL: PAINLEVEL_OUTOF10: 0

## 2024-11-07 NOTE — PLAN OF CARE
Problem: Chronic Conditions and Co-morbidities  Goal: Patient's chronic conditions and co-morbidity symptoms are monitored and maintained or improved  11/2/2024 1126 by Gail Mendez RN  Outcome: Progressing  Flowsheets (Taken 11/2/2024 0800)  Care Plan - Patient's Chronic Conditions and Co-Morbidity Symptoms are Monitored and Maintained or Improved: Monitor and assess patient's chronic conditions and comorbid symptoms for stability, deterioration, or improvement     Problem: Discharge Planning  Goal: Discharge to home or other facility with appropriate resources  11/2/2024 1126 by Gail Mendez RN  Outcome: Progressing  Flowsheets (Taken 11/2/2024 0800)  Discharge to home or other facility with appropriate resources:   Identify barriers to discharge with patient and caregiver   Arrange for needed discharge resources and transportation as appropriate     Problem: Safety - Adult  Goal: Free from fall injury  11/2/2024 1126 by Gail Mendez RN  Outcome: Progressing     Problem: Pain  Goal: Verbalizes/displays adequate comfort level or baseline comfort level  11/2/2024 1126 by Gail Mendez RN  Outcome: Progressing     Problem: Respiratory - Adult  Goal: Achieves optimal ventilation and oxygenation  11/2/2024 1126 by Gail Mendez RN  Outcome: Progressing     Problem: Cardiovascular - Adult  Goal: Maintains optimal cardiac output and hemodynamic stability  11/2/2024 1126 by Gail Mendez RN  Outcome: Progressing  Flowsheets (Taken 11/2/2024 0800)  Maintains optimal cardiac output and hemodynamic stability:   Monitor blood pressure and heart rate   Monitor urine output and notify Licensed Independent Practitioner for values outside of normal range   Assess for signs of decreased cardiac output   Administer fluid and/or volume expanders as ordered     Problem: Cardiovascular - Adult  Goal: Absence of cardiac dysrhythmias or at baseline  11/2/2024 1126 by Gail Mendez RN  Outcome: Progressing  Flowsheets (Taken 
  Problem: Chronic Conditions and Co-morbidities  Goal: Patient's chronic conditions and co-morbidity symptoms are monitored and maintained or improved  11/3/2024 1816 by Gail Mendez RN  Outcome: Progressing     Problem: Discharge Planning  Goal: Discharge to home or other facility with appropriate resources  11/3/2024 1816 by Gail Mendez RN  Outcome: Progressing     Problem: Pain  Goal: Verbalizes/displays adequate comfort level or baseline comfort level  11/3/2024 1816 by Gail Mendez RN  Outcome: Progressing     Problem: Respiratory - Adult  Goal: Achieves optimal ventilation and oxygenation  11/3/2024 1816 by Gail Mendez RN  Outcome: Progressing     Problem: Cardiovascular - Adult  Goal: Maintains optimal cardiac output and hemodynamic stability  11/3/2024 1816 by Gail Mendez RN  Outcome: Progressing     Problem: Cardiovascular - Adult  Goal: Absence of cardiac dysrhythmias or at baseline  11/3/2024 1816 by Gail Mendez RN  Outcome: Progressing     Problem: Skin/Tissue Integrity - Adult  Goal: Skin integrity remains intact  11/3/2024 1816 by Gail Mendez RN  Outcome: Progressing     Problem: Skin/Tissue Integrity - Adult  Goal: Incisions, wounds, or drain sites healing without S/S of infection  11/3/2024 1816 by Gail Mendez RN  Outcome: Progressing     Problem: Musculoskeletal - Adult  Goal: Return mobility to safest level of function  11/3/2024 1816 by Gail Mendez RN  Outcome: Progressing     Problem: Infection - Adult  Goal: Absence of infection at discharge  11/3/2024 1816 by Gail Mendez RN  Outcome: Progressing     Problem: Metabolic/Fluid and Electrolytes - Adult  Goal: Electrolytes maintained within normal limits  11/3/2024 1816 by Gail Mendez RN  Outcome: Progressing     Problem: Metabolic/Fluid and Electrolytes - Adult  Goal: Hemodynamic stability and optimal renal function maintained  11/3/2024 1816 by Gail Mendez RN  Outcome: Progressing     Problem: Skin/Tissue Integrity  Goal: 
  Problem: Chronic Conditions and Co-morbidities  Goal: Patient's chronic conditions and co-morbidity symptoms are monitored and maintained or improved  11/4/2024 0605 by Isidro Villarreal RN  Outcome: Progressing  Flowsheets (Taken 11/3/2024 1930)  Care Plan - Patient's Chronic Conditions and Co-Morbidity Symptoms are Monitored and Maintained or Improved:   Monitor and assess patient's chronic conditions and comorbid symptoms for stability, deterioration, or improvement   Collaborate with multidisciplinary team to address chronic and comorbid conditions and prevent exacerbation or deterioration  11/3/2024 1816 by Gail Mendez RN  Outcome: Progressing     Problem: Discharge Planning  Goal: Discharge to home or other facility with appropriate resources  11/4/2024 0605 by Isidro Villarreal RN  Outcome: Progressing  Flowsheets (Taken 11/3/2024 1930)  Discharge to home or other facility with appropriate resources:   Identify barriers to discharge with patient and caregiver   Arrange for needed discharge resources and transportation as appropriate   Identify discharge learning needs (meds, wound care, etc)  11/3/2024 1816 by Gail Mendez RN  Outcome: Progressing     Problem: Safety - Adult  Goal: Free from fall injury  11/4/2024 0605 by Isidro Villarreal RN  Outcome: Progressing  11/3/2024 1816 by Gail Mendez RN  Outcome: Progressing     Problem: Pain  Goal: Verbalizes/displays adequate comfort level or baseline comfort level  11/4/2024 0605 by Isidro Villarreal RN  Outcome: Progressing  Flowsheets (Taken 11/3/2024 1930)  Verbalizes/displays adequate comfort level or baseline comfort level:   Encourage patient to monitor pain and request assistance   Assess pain using appropriate pain scale  11/3/2024 1816 by Gail Mednez RN  Outcome: Progressing     Problem: Respiratory - Adult  Goal: Achieves optimal ventilation and oxygenation  11/4/2024 0605 by Isidro Villarreal RN  Outcome: Progressing  Flowsheets (Taken 11/3/2024 1930)  Achieves 
  Problem: Chronic Conditions and Co-morbidities  Goal: Patient's chronic conditions and co-morbidity symptoms are monitored and maintained or improved  Outcome: Adequate for Discharge     Problem: Discharge Planning  Goal: Discharge to home or other facility with appropriate resources  Outcome: Adequate for Discharge     Problem: Safety - Adult  Goal: Free from fall injury  Outcome: Adequate for Discharge     Problem: Pain  Goal: Verbalizes/displays adequate comfort level or baseline comfort level  Outcome: Adequate for Discharge     Problem: Respiratory - Adult  Goal: Achieves optimal ventilation and oxygenation  Outcome: Adequate for Discharge     Problem: Cardiovascular - Adult  Goal: Maintains optimal cardiac output and hemodynamic stability  Outcome: Adequate for Discharge  Goal: Absence of cardiac dysrhythmias or at baseline  Outcome: Adequate for Discharge     Problem: Skin/Tissue Integrity - Adult  Goal: Skin integrity remains intact  Outcome: Adequate for Discharge  Goal: Incisions, wounds, or drain sites healing without S/S of infection  Outcome: Adequate for Discharge     Problem: Musculoskeletal - Adult  Goal: Return mobility to safest level of function  Outcome: Adequate for Discharge     Problem: Infection - Adult  Goal: Absence of infection at discharge  Outcome: Adequate for Discharge     Problem: Metabolic/Fluid and Electrolytes - Adult  Goal: Electrolytes maintained within normal limits  Outcome: Adequate for Discharge  Goal: Hemodynamic stability and optimal renal function maintained  Outcome: Adequate for Discharge     Problem: Skin/Tissue Integrity  Goal: Absence of new skin breakdown  Description: 1.  Monitor for areas of redness and/or skin breakdown  2.  Assess vascular access sites hourly  3.  Every 4-6 hours minimum:  Change oxygen saturation probe site  4.  Every 4-6 hours:  If on nasal continuous positive airway pressure, respiratory therapy assess nares and determine need for appliance 
  Problem: Chronic Conditions and Co-morbidities  Goal: Patient's chronic conditions and co-morbidity symptoms are monitored and maintained or improved  Outcome: Progressing  Flowsheets (Taken 11/2/2024 1945)  Care Plan - Patient's Chronic Conditions and Co-Morbidity Symptoms are Monitored and Maintained or Improved:   Monitor and assess patient's chronic conditions and comorbid symptoms for stability, deterioration, or improvement   Collaborate with multidisciplinary team to address chronic and comorbid conditions and prevent exacerbation or deterioration     Problem: Discharge Planning  Goal: Discharge to home or other facility with appropriate resources  Outcome: Progressing  Flowsheets (Taken 11/2/2024 1945)  Discharge to home or other facility with appropriate resources:   Identify barriers to discharge with patient and caregiver   Arrange for needed discharge resources and transportation as appropriate   Identify discharge learning needs (meds, wound care, etc)     Problem: Safety - Adult  Goal: Free from fall injury  Outcome: Progressing     Problem: Pain  Goal: Verbalizes/displays adequate comfort level or baseline comfort level  Outcome: Progressing  Flowsheets  Taken 11/3/2024 0400  Verbalizes/displays adequate comfort level or baseline comfort level:   Encourage patient to monitor pain and request assistance   Assess pain using appropriate pain scale   Administer analgesics based on type and severity of pain and evaluate response  Taken 11/2/2024 2000  Verbalizes/displays adequate comfort level or baseline comfort level:   Encourage patient to monitor pain and request assistance   Assess pain using appropriate pain scale   Administer analgesics based on type and severity of pain and evaluate response     Problem: Respiratory - Adult  Goal: Achieves optimal ventilation and oxygenation  Outcome: Progressing  Flowsheets (Taken 11/2/2024 1945)  Achieves optimal ventilation and oxygenation:   Assess for 
  Problem: Chronic Conditions and Co-morbidities  Goal: Patient's chronic conditions and co-morbidity symptoms are monitored and maintained or improved  Outcome: Progressing  Flowsheets (Taken 11/4/2024 1930)  Care Plan - Patient's Chronic Conditions and Co-Morbidity Symptoms are Monitored and Maintained or Improved: Monitor and assess patient's chronic conditions and comorbid symptoms for stability, deterioration, or improvement     Problem: Discharge Planning  Goal: Discharge to home or other facility with appropriate resources  Outcome: Progressing  Flowsheets (Taken 11/4/2024 1930)  Discharge to home or other facility with appropriate resources: Identify barriers to discharge with patient and caregiver     Problem: Safety - Adult  Goal: Free from fall injury  Outcome: Progressing  Flowsheets (Taken 11/4/2024 1930)  Free From Fall Injury: Instruct family/caregiver on patient safety     Problem: Pain  Goal: Verbalizes/displays adequate comfort level or baseline comfort level  Outcome: Progressing  Flowsheets (Taken 11/3/2024 1930 by Isidro Villarreal, RN)  Verbalizes/displays adequate comfort level or baseline comfort level:   Encourage patient to monitor pain and request assistance   Assess pain using appropriate pain scale     Problem: Respiratory - Adult  Goal: Achieves optimal ventilation and oxygenation  Outcome: Progressing  Flowsheets (Taken 11/5/2024 0316)  Achieves optimal ventilation and oxygenation: Assess for changes in respiratory status     Problem: Cardiovascular - Adult  Goal: Maintains optimal cardiac output and hemodynamic stability  Outcome: Progressing  Flowsheets (Taken 11/4/2024 1930)  Maintains optimal cardiac output and hemodynamic stability: Monitor blood pressure and heart rate  Goal: Absence of cardiac dysrhythmias or at baseline  Outcome: Progressing  Flowsheets (Taken 11/4/2024 1930)  Absence of cardiac dysrhythmias or at baseline: Monitor cardiac rate and rhythm     Problem: Skin/Tissue 
  Problem: Musculoskeletal - Adult  Goal: Return mobility to safest level of function  11/5/2024 1549 by Cari Lee RN  Outcome: Progressing  Flowsheets (Taken 11/5/2024 0800 by Estrella Aaron, RN)  Return Mobility to Safest Level of Function: Obtain physical therapy/occupational therapy consults as needed  11/5/2024 0316 by Tiana Main RN  Outcome: Not Progressing  Flowsheets (Taken 11/5/2024 0316)  Return Mobility to Safest Level of Function: Obtain physical therapy/occupational therapy consults as needed     Problem: Infection - Adult  Goal: Absence of infection at discharge  11/5/2024 1549 by Cari Lee RN  Outcome: Progressing  Flowsheets (Taken 11/5/2024 0800)  Absence of infection at discharge:   Assess and monitor for signs and symptoms of infection   Monitor lab/diagnostic results   Monitor all insertion sites i.e., indwelling lines, tubes and drains   Monitor endotracheal (as able) and nasal secretions for changes in amount and color   [place] appropriate cooling/warming therapies per order   Instruct and encourage patient and family to use good hand hygiene technique   Administer medications as ordered  11/5/2024 0316 by Tiana Main, RN  Outcome: Not Progressing  Flowsheets (Taken 11/4/2024 1930)  Absence of infection at discharge: Assess and monitor for signs and symptoms of infection     
Problem: Discharge Planning  Goal: Discharge to home or other facility with appropriate resources  11/4/2024 1037 by Lisa Urias, RN  Outcome: Progressing  Flowsheets (Taken 11/4/2024 1037)  Discharge to home or other facility with appropriate resources:   Identify barriers to discharge with patient and caregiver   Identify discharge learning needs (meds, wound care, etc)   Refer to discharge planning if patient needs post-hospital services based on physician order or complex needs related to functional status, cognitive ability or social support system   Arrange for needed discharge resources and transportation as appropriate     Problem: Safety - Adult  Goal: Free from fall injury  11/4/2024 1037 by Lisa Urias, RN  Outcome: Progressing  Flowsheets (Taken 11/4/2024 1037)  Free From Fall Injury: Instruct family/caregiver on patient safety     Problem: Respiratory - Adult  Goal: Achieves optimal ventilation and oxygenation  11/4/2024 1037 by Lisa Urias, RN  Outcome: Progressing  Flowsheets (Taken 11/4/2024 1037)  Achieves optimal ventilation and oxygenation:   Assess for changes in respiratory status   Respiratory therapy support as indicated   Assess for changes in mentation and behavior   Encourage broncho-pulmonary hygiene including cough, deep breathe, incentive spirometry   Assess and instruct to report shortness of breath or any respiratory difficulty   Oxygen supplementation based on oxygen saturation or arterial blood gases     Problem: Cardiovascular - Adult  Goal: Maintains optimal cardiac output and hemodynamic stability  11/4/2024 1037 by Lisa Urias, RN  Outcome: Progressing  Flowsheets (Taken 11/4/2024 1037)  Maintains optimal cardiac output and hemodynamic stability:   Monitor blood pressure and heart rate   Monitor urine output and notify Licensed Independent Practitioner for values outside of normal range   Assess for signs of decreased cardiac output   Administer vasoactive medications 
Progressing  Flowsheets (Taken 11/1/2024 2200)  Electrolytes maintained within normal limits:   Monitor labs and assess patient for signs and symptoms of electrolyte imbalances   Administer electrolyte replacement as ordered   Monitor response to electrolyte replacements, including repeat lab results as appropriate  Goal: Hemodynamic stability and optimal renal function maintained  Outcome: Progressing  Flowsheets (Taken 11/1/2024 2200)  Hemodynamic stability and optimal renal function maintained:   Monitor labs and assess for signs and symptoms of volume excess or deficit   Monitor intake, output and patient weight   Monitor urine specific gravity, serum osmolarity and serum sodium as indicated or ordered   Monitor response to interventions for patient's volume status, including labs, urine output, blood pressure (other measures as available)     Problem: Skin/Tissue Integrity  Goal: Absence of new skin breakdown  Description: 1.  Monitor for areas of redness and/or skin breakdown  2.  Assess vascular access sites hourly  3.  Every 4-6 hours minimum:  Change oxygen saturation probe site  4.  Every 4-6 hours:  If on nasal continuous positive airway pressure, respiratory therapy assess nares and determine need for appliance change or resting period.  Outcome: Progressing     Problem: ABCDS Injury Assessment  Goal: Absence of physical injury  Outcome: Progressing     
at discharge: Assess and monitor for signs and symptoms of infection  11/5/2024 1549 by Cari Lee RN  Outcome: Progressing  Flowsheets (Taken 11/5/2024 0800)  Absence of infection at discharge:   Assess and monitor for signs and symptoms of infection   Monitor lab/diagnostic results   Monitor all insertion sites i.e., indwelling lines, tubes and drains   Monitor endotracheal (as able) and nasal secretions for changes in amount and color   [place] appropriate cooling/warming therapies per order   Instruct and encourage patient and family to use good hand hygiene technique   Administer medications as ordered     Problem: Metabolic/Fluid and Electrolytes - Adult  Goal: Electrolytes maintained within normal limits  11/6/2024 0333 by Tiana Main RN  Outcome: Progressing  Flowsheets (Taken 11/5/2024 1930)  Electrolytes maintained within normal limits: Monitor labs and assess patient for signs and symptoms of electrolyte imbalances  11/5/2024 1549 by Cari Lee RN  Outcome: Progressing  Flowsheets (Taken 11/5/2024 0800)  Electrolytes maintained within normal limits:   Monitor labs and assess patient for signs and symptoms of electrolyte imbalances   Administer electrolyte replacement as ordered   Monitor response to electrolyte replacements, including repeat lab results as appropriate  Goal: Hemodynamic stability and optimal renal function maintained  11/6/2024 0333 by Tiana Main RN  Outcome: Progressing  Flowsheets (Taken 11/5/2024 1930)  Hemodynamic stability and optimal renal function maintained:   Monitor labs and assess for signs and symptoms of volume excess or deficit   Monitor intake, output and patient weight  11/5/2024 1549 by Cari Lee RN  Outcome: Progressing  Flowsheets (Taken 11/5/2024 0800)  Hemodynamic stability and optimal renal function maintained:   Monitor labs and assess for signs and symptoms of volume excess or deficit   Monitor intake, output and patient weight   
continuous positive airway pressure, respiratory therapy assess nares and determine need for appliance change or resting period.  Outcome: Progressing     Problem: ABCDS Injury Assessment  Goal: Absence of physical injury  Outcome: Progressing  Flowsheets (Taken 11/6/2024 1779)  Absence of Physical Injury: Implement safety measures based on patient assessment

## 2024-11-07 NOTE — PROGRESS NOTES
Pulmonary Critical Care progress Note     Patient's name:  Don Tee  Medical Record Number: [id number]  Patient's account/billing number: [id number]  Patient's YOB: 1955  Age: 68 y.o.  Date of Admission: 11/1/2024  3:29 PM  Date of Consult: 11/3/2024      Primary Care Physician: Omid Barry APRN - CNP      Code Status: Full Code    Reason for consult: Large left pleural effusion    Assessment and Plan     Exudative large left pleural effusion  Acute on chronic systolic CHF 20%  Masslike consolidation left lower lobe  Severe COPD/emphysema  Acute PE subsegmental  Acute respiratory failure with hypoxia  A-fib with RVR  Acute kidney injury with Hyperkalemia   DKA         Plan:  Follow up pleural fluid culture and cytology  Cefepime  Bronchodilators Spiriva and Symbicort  Heparin drip  A-fib/ischemia work up per cardiology  Pressors keep MAP > 65  Inotrope per cardiology   Continue chest tube to wall suction, CXR in the am  Need follow up imaging to confirm resolution of his LLL airspace disease, consider bronch  CODE STATUS needs clarification when patient is in the mood to discuss  Due to the immediate potential for life-threatening deterioration due to above , I spent 33 minutes providing critical care.  This time is excluding time spent performing procedures.  This note was transcribed using Dragon Dictation software. Please disregard any translational errors.    Subjective:  Cultures negative to date  Echocardiogram with EF of 20%      HISTORY OF PRESENT ILLNESS:   Mr./Ms. Don Tee is a 68 y.o. gentleman with past medical history stated below significant for history of smoking, COPD/emphysema, poorly controlled diabetes mellitus, presented to the hospital with worsening shortness of breath and elevated blood sugars, found with large left pleural effusion with significant left lung collapse, A-fib with RVR,  Status post chest tube fluid 
                          Patient ID: Don Tee  Referring/ Physician: Mynor Encarnacion MD      Summary:   Don Tee is being seen by nephrology for GABRIELLE.     Reason for admission: DKA      Interval Hx and Plan:   Patient seen at bedside   Comfortable  On Levophed, Milrinone infusion.   On 5 lpm oxygen which got worse  Urine output 3510 ml   Cr stable 0.7  Na 128 > 128  K 3.8  Bicarbonate 34  Plan for hospice care noted          On Diuretics and rest of medications per cardiology/ Hospice team for CHF  Monitor renal panel q 8 hr and replete lytes as needed while here  Patient refused oral Urea       D/W RN        Assessment  GABRIELLE  Creatinine peaked at 1.4 and is trending down with IV fluids.  GABRIELLE secondary to DKA    Hyperkalemia  Due to DKA  Improved with insulin infusion  Got a dose of Lokelma    DKA  Resolved anion gap closed    CHF  On Lasix 40 mg IV twice daily BNP was elevated  EF 10 to 15%    Left pleural effusion  Status post chest tube      [facility] would like to thank you for the opportunity to serve this patient. Please call with any questions or concerns.    Harry Schultz MD  [facility]  [address] [place] [address]  Fax: [phone]  Office: [phone]    \[facility] [place] [facility]\""  Don Tee is a 68 y.o. male  with  has a past medical history of Allergic rhinitis, arthri, Arthritis, CAD (coronary artery disease), Chronic kidney disease, Constipation, DM (diabetes mellitus) ([facility]), Generalized pain, GERD (gastroesophageal reflux disease), Heart attack (HCC), Heart disease, Hyperlipidemia, Movement disorder, Psychiatric problem, and Urinary retention. who presented with shortness of breath and elevated blood sugar.  Patient presented to the emergency room feeling short of breath.  He was satting 90% on nonrebreather when he was brought to the ICU.  He was treated for DKA and was hyperkalemic initially.  Blood sugars were over 400.  He has been having a chronic left foot ulcer also.  
                          Patient ID: Don Tee  Referring/ Physician: Mynor Encarnacion MD      Summary:   Don Tee is being seen by nephrology for GABRIELLE.     Reason for admission: DKA      Interval Hx and Plan:   Patient seen at bedside with nurse  Comfortable  On Levophed, Milrinone infusion.   On 2 lpm oxygen  Urine output 4840 ml   Cr stable 0.6  Na 131  K 3.4  Mag 1.59  Net neg 12.8  liters          GABRIELLE is resolved  On IV Lasix for severe CHF. Making good urine and Cr stable.   On Levophed and Milrinone.  Defer diuretics to cardiology team.   Monitor renal panel q 8 hr and replete lytes as needed.       D/W RN        Assessment  GABRIELLE  Creatinine peaked at 1.4 and is trending down with IV fluids.  GABRIELLE secondary to DKA    Hyperkalemia  Due to DKA  Improved with insulin infusion  Got a dose of Lokelma    DKA  Resolved anion gap closed    CHF  On Lasix 40 mg IV twice daily BNP was elevated  EF 10 to 15%    Left pleural effusion  Status post chest tube      [facility] would like to thank you for the opportunity to serve this patient. Please call with any questions or concerns.    Harry Schultz MD  [facility]  [address] [place] [address]  Fax: [phone]  Office: [phone]    [facility]  Don Tee is a 68 y.o. male  with  has a past medical history of Allergic rhinitis, arthri, Arthritis, CAD (coronary artery disease), Chronic kidney disease, Constipation, DM (diabetes mellitus) ([facility]), Generalized pain, GERD (gastroesophageal reflux disease), Heart attack (HCC), Heart disease, Hyperlipidemia, Movement disorder, Psychiatric problem, and Urinary retention. who presented with shortness of breath and elevated blood sugar.  Patient presented to the emergency room feeling short of breath.  He was satting 90% on nonrebreather when he was brought to the ICU.  He was treated for DKA and was hyperkalemic initially.  Blood sugars were over 400.  He has been having a chronic left foot 
                          Patient ID: Don Tee  Referring/ Physician: Mynor Encarnacion MD      Summary:   Don Tee is being seen by nephrology for GABRIELLE.     Reason for admission: DKA      Interval Hx and Plan:   Patient seen at bedside with nurse  Comfortable  On Levophed, Milrinone infusion.   On 4 lpm oxygen  Urine output 4160 ml   Cr stable 0.7  Na 131 > 128  K 4.3  Mag 1.78  Net neg 12.8  liters            On Diuretics per cardiology team for CHF.  Making good urine and Cr stable.   On Levophed and Milrinone.  Monitor renal panel q 8 hr and replete lytes as needed.   Fluid restriction and oral Ur 30 g x 1 and follow Na level.       D/W RN        Assessment  GABRIELLE  Creatinine peaked at 1.4 and is trending down with IV fluids.  GABRIELLE secondary to DKA    Hyperkalemia  Due to DKA  Improved with insulin infusion  Got a dose of Lokelma    DKA  Resolved anion gap closed    CHF  On Lasix 40 mg IV twice daily BNP was elevated  EF 10 to 15%    Left pleural effusion  Status post chest tube      [facility] would like to thank you for the opportunity to serve this patient. Please call with any questions or concerns.    Harry Schultz MD  [facility]  [address] [id number]  Fax: [phone]  Office: [phone]    HPI  Don Tee is a 68 y.o. male  with  has a past medical history of Allergic rhinitis, arthri, Arthritis, CAD (coronary artery disease), Chronic kidney disease, Constipation, DM (diabetes mellitus) ([facility]), Generalized pain, GERD (gastroesophageal reflux disease), Heart attack (HCC), Heart disease, Hyperlipidemia, Movement disorder, Psychiatric problem, and Urinary retention. who presented with shortness of breath and elevated blood sugar.  Patient presented to the emergency room feeling short of breath.  He was satting 90% on nonrebreather when he was brought to the ICU.  He was treated for DKA and was hyperkalemic initially.  Blood sugars were over 400.  He has been 
      Infectious Diseases Inpatient Progress Note      CHIEF COMPLAINT:     DKA  Cardiogenic shock  Septic shock  Left pleural effusion  Left pneumothorax  Left lower lobe consolidation  LFT elevation  WBC elevation          HISTORY OF PRESENT ILLNESS:  68 y.o. male with a significant history for arthritis, diabetes poor control, coronary artery disease, history of MI, hyperlipidemia, anxiety, depression, urinary retention admitted to hospital secondary to shortness of breath found to be in DKA blood glucose greater than 600 hemoglobin A1c greater than 12.2 currently in ICU for DKA protocol also found to have left lower lobe consolidation associated with pleural effusion CT chest PE protocol was positive for acute right pulmonary thromboemboli with possible right ventricular strain there was also right and left pleural effusion with complete collapse of left lower lobe.  Patient underwent left-sided chest tube placement with drainage of pleural effusion but also noted pneumothorax, follow-up chest x-ray with near complete reexpansion of previously noted left pneumothorax, labs indicated creatinine at 1.4 potassium was 6.8 lactic acid at 5 ALT 05422 AST 1759 blood culture in process pleural fluid culture in process we are consulted for antibiotic recommendations        Interval history: Remains in ICU left slowly improving creatinine stable at 0.7 remains on anticoagulation tolerating antibiotic therapy okay remains hypotensive at times, requiring 5 L nasal cannula remains on pressor support - Pt now interested in Hospice care and seen by cardiology already -     Past Medical History:    Past Medical History:   Diagnosis Date    Allergic rhinitis     arthri     Arthritis     lumbar spine    CAD (coronary artery disease)     Chronic kidney disease     retention, bph    Constipation     DM (diabetes mellitus) (HCC)     Generalized pain     GERD (gastroesophageal reflux disease)     constipation    Heart attack (HCC)  
      [facility]   Daily Progress Note      Admit Date:  11/1/2024      Subjective:   Mr. Tee is a 69yo male admitted to [facility] with shortness of breath and respiratory failure. He was found to have biventricular failure with an LVEF of 10%. He had a chest tube placed for his transudative effusion.    Interval History:  \Patient requiring 10 mcg of Levophed to maintain mean arterial pressure 65 mmHg, clearly lethargic, making urine transaminases steady if not mildly improving on current dose of milrinone.      Objective:     Blood Pressure (Abnormal) 103/54   Pulse 94   Temperature 98.1 °F (36.7 °C) (Axillary)   Respiration 20   Height 1.829 m (6')   Weight 64.7 kg (142 lb 10.2 oz)   Oxygen Saturation 92%   Body Mass Index 19.35 kg/m²      Intake/Output Summary (Last 24 hours) at 11/5/2024 1508  Last data filed at 11/5/2024 1502  Gross per 24 hour   Intake 4270.74 ml   Output 5275 ml   Net -1004.26 ml       Physical Exam:  General:  Awake, alert, NAD  Skin:  Warm and dry  Neck:  JVD<8, no carotid bruits  Chest:  Clear to auscultation, no wheezes/rhonchi/rales, Chest tube in place.   Cardiovascular:  RRR, normal S1/S2, no M/R/G  Abdomen:  Soft, nontender, +bowel sounds  Extremities:  No edema  Pulses: 2+ bilat carotid    2+ bilat radial    2+ bilat femoral        Medications:    amiodarone  400 mg Oral BID    balsum peru-castor oil   Topical BID    metroNIDAZOLE  500 mg IntraVENous Q8H    insulin lispro  0-16 Units SubCUTAneous 4x Daily AC & HS    linezolid  600 mg IntraVENous Q12H    buPROPion  100 mg Oral BID    [Held by provider] empagliflozin  10 mg Oral Daily    insulin glargine  20 Units SubCUTAneous QAM    primidone  50 mg Oral Daily    QUEtiapine  400 mg Oral Nightly    tamsulosin  0.4 mg Oral Daily    vitamin B-1  100 mg Oral Daily    vitamin D  5,000 Units Oral Once per day on Monday Thursday    ipratropium 0.5 mg-albuterol 2.5 mg  1 Dose Inhalation Q4H WA RT    cefepime  2,000 mg 
      [facility]   Daily Progress Note      Admit Date:  11/1/2024      Subjective:   Mr. Tee is a 69yo male admitted to [facility] with shortness of breath and respiratory failure. He was found to have biventricular failure with an LVEF of 10%. He had a chest tube placed for his transudative effusion.    Interval History:  Don remains in the ICU on milrinone at 0.25mcg/kg/min and norepi at 4mcg/kg/min. He is on about 4 liters of oxygen currently. No events overnight. PLan for Hospice tomorrow.         Objective:     BP (!) 99/45   Pulse 88   Temp 97.9 °F (36.6 °C) (Oral)   Resp 12   Ht 1.829 m (6')   Wt 65.8 kg (145 lb 1 oz)   SpO2 93%   BMI 19.67 kg/m²      Intake/Output Summary (Last 24 hours) at 11/6/2024 1528  Last data filed at 11/6/2024 1120  Gross per 24 hour   Intake 2314.86 ml   Output 3560 ml   Net -1245.14 ml       Physical Exam:  General:  Awake, alert, NAD  Skin:  Warm and dry  Neck:  JVD<8, no carotid bruits  Chest:  Clear to auscultation, no wheezes/rhonchi/rales, Chest tube in place.   Cardiovascular:  RRR, normal S1/S2, no M/R/G  Abdomen:  Soft, nontender, +bowel sounds  Extremities:  No edema  Pulses: 2+ bilat carotid    2+ bilat radial    2+ bilat femoral        Medications:    midodrine  5 mg Oral TID WC    insulin glargine  30 Units SubCUTAneous QAM    [START ON 11/7/2024] tamsulosin  0.4 mg Oral Nightly    amiodarone  400 mg Oral BID    balsum peru-castor oil   Topical BID    metroNIDAZOLE  500 mg IntraVENous Q8H    insulin lispro  0-16 Units SubCUTAneous 4x Daily AC & HS    buPROPion  100 mg Oral BID    [Held by provider] empagliflozin  10 mg Oral Daily    primidone  50 mg Oral Daily    QUEtiapine  400 mg Oral Nightly    vitamin B-1  100 mg Oral Daily    vitamin D  5,000 Units Oral Once per day on Monday Thursday    cefepime  2,000 mg IntraVENous Q8H    tiotropium  2 puff Inhalation Daily RT    budesonide-formoterol  2 puff Inhalation BID RT    furosemide  40 mg 
      [facility]   Daily Progress Note      Admit Date:  11/1/2024      Subjective:   Mr. Tee is a 69yo male admitted to [facility] with shortness of breath and respiratory failure. He was found to have biventricular failure with an LVEF of 10%. He had a chest tube placed for his transudative effusion.    Interval History:  He is now only wearing oxygen for comfort. Overnight, he disconnected his chest tube and resulted in a pneumothorax. No other events overnight.     He is on norepinephrine at 20mcg/kg/min and milrinone at 0.25mcg/kg/min.       Objective:     BP (!) 95/57   Pulse 89   Temp 97.5 °F (36.4 °C) (Axillary)   Resp 19   Ht 1.829 m (6')   Wt 63.9 kg (140 lb 14 oz)   SpO2 96%   BMI 19.11 kg/m²      Intake/Output Summary (Last 24 hours) at 11/4/2024 1733  Last data filed at 11/4/2024 1628  Gross per 24 hour   Intake 3444.8 ml   Output 4905 ml   Net -1460.2 ml       Physical Exam:  General:  Awake, alert, NAD  Skin:  Warm and dry  Neck:  JVD<8, no carotid bruits  Chest:  Clear to auscultation, no wheezes/rhonchi/rales, Chest tube in place.   Cardiovascular:  RRR, normal S1/S2, no M/R/G  Abdomen:  Soft, nontender, +bowel sounds  Extremities:  No edema  Pulses: 2+ bilat carotid    2+ bilat radial    2+ bilat femoral        Medications:    balsum peru-castor oil   Topical BID    insulin lispro  0-16 Units SubCUTAneous 4x Daily AC & HS    linezolid  600 mg IntraVENous Q12H    buPROPion  100 mg Oral BID    [Held by provider] empagliflozin  10 mg Oral Daily    insulin glargine  20 Units SubCUTAneous QAM    primidone  50 mg Oral Daily    QUEtiapine  400 mg Oral Nightly    tamsulosin  0.4 mg Oral Daily    vitamin B-1  100 mg Oral Daily    vitamin D  5,000 Units Oral Once per day on Monday Thursday    ipratropium 0.5 mg-albuterol 2.5 mg  1 Dose Inhalation Q4H WA RT    cefepime  2,000 mg IntraVENous Q8H    tiotropium  2 puff Inhalation Daily RT    budesonide-formoterol  2 puff Inhalation BID RT    
    V2.0    [facility] Progress Note      Name:  Don Tee /Age/Sex: 1955  (68 y.o. male)   MRN & CSN:  [id number] & [id number] Encounter Date/Time: 2024 8:57 AM EDT   Location:  [id number] PCP: Omid Barry, APRN - CNP     Attending:Mynor Encarnacion MD       Hospital Day: 5      HPI :         Subjective:     No acute events reported overnight,, no chest pain or shortness of breath, chest tube still in place      Review of Systems:      Pertinent positives and negatives discussed in HPI    Objective:     Intake/Output Summary (Last 24 hours) at 2024 0927  Last data filed at 2024 0758  Gross per 24 hour   Intake 4229.51 ml   Output 5535 ml   Net -1305.49 ml      Vitals:   Vitals:    24 0730 24 0748 24 0830 24 0831   BP: (!) 106/50 (!) 109/51     Pulse: 80  81 81   Resp: 16  13 13   Temp:  97.2 °F (36.2 °C)     TempSrc:  Axillary     SpO2: 96%  95% 95%   Weight:       Height:             Physical Exam:      Physical Exam Performed:    BP (!) 109/51   Pulse 81   Temp 97.2 °F (36.2 °C) (Axillary)   Resp 13   Ht 1.829 m (6')   Wt 64.7 kg (142 lb 10.2 oz)   SpO2 95%   BMI 19.35 kg/m²     General appearance: No apparent distress, appears stated age and cooperative.  Neck: Supple, with full range of motion. No jugular venous distention. Trachea midline.  Respiratory:  Normal respiratory effort. Clear to auscultation, bilaterally without Rales/Wheezes/Rhonchi.  Cardiovascular: Regular rate and rhythm with normal S1/S2 without murmurs, rubs or gallops.  Abdomen: Soft, non-tender, non-distended with normal bowel sounds.  Musculoskeletal: No clubbing, cyanosis or edema bilaterally.  Full range of motion without deformity.  Neurologic:  Neurovascularly intact without any focal sensory/motor deficits. Cranial nerves: II-XII intact, grossly non-focal.        Medications:   Medications:    calcium gluconate-NaCl  1,000 mg IntraVENous Once    balsum peru-castor oil   
  Arrived to place PICC line with bedside RN. Pre-procedure and timeout done with RN, discussed limitations of placement and allergies.      Pt's basilic, brachial, cephalic are all easily collapsible with no indication for a clot. Vein selected is large enough for catheter. Pt tolerated sterile procedure well, with no difficulty accessing basilic vein, when accessed - blood was free flowing and non-pulsatile. Guidewire, introducer, and catheter went in smoothly.   PICC line verified with 3CG technology with peaked P-waves (please see image below). PICC tip terminates in the SVC according to [employer] 3CG tip confirmation system. PICC was seen dropping into SVC with tip tracking technology and discernable peaked p waves were noted without negative deflection.   OK to use PICC.   Please use new IV tubing when connecting to the newly placed central line.      Post procedure - reorganized pt table, placed pt in lowest position, with call light and educated on line care. Reported off to bedside RN.      Please call if you have any questions about the PICC or ML. The  will direct you to the PICC RN that is on call.      [phone]          
  [facility]  Palliative Care   Progress Note    NAME:  Don Tee  MEDICAL RECORD NUMBER:  [id number]  AGE: 68 y.o.   GENDER: male  : 1955  TODAY'S DATE:  2024    Subjective: sleeping soundly, did not wake him up     Objective:    Vitals:    24 1316   BP: (!) 95/47   Pulse: 90   Resp: 13   Temp:    SpO2: 94%     Lab Results   Component Value Date    WBC 9.1 2024    HGB 11.1 (L) 2024    HCT 32.8 (L) 2024    MCV 91.9 2024    PLT 97 (L) 2024     Lab Results   Component Value Date    CREATININE 0.7 (L) 2024    BUN 19 2024     (L) 2024    K 4.3 2024    CL 88 (L) 2024    CO2 33 (H) 2024     Lab Results   Component Value Date    ALT 1,142 (H) 2024     (H) 2024    ALKPHOS 207 (H) 2024    BILITOT 0.6 2024       Plan: he met with hospice yesterday, not sure if going home or inpatient hospice once chest tube out and levo off, may go with milrinone gtt.       Code Status: DNR-CC  Discharge Environment:  [x] Hospice Consult Agency: [facility]   [x] Inpatient Hospice  vs  [x] Home with Hospice Care     Teaching Time:  0hours  30 min     I will continue to follow Mr. Tee's care as needed.      Thank you for allowing me to participate in the care of Mr. Tee .     Electronically signed by Riana Dodd, RN, BSN,CHPN on 2024 at 1:57 PM  Palliative Care Nurse [facility]  Office: [phone]      
  [facility]  Palliative Care   Progress Note    NAME:  Don Tee  MEDICAL RECORD NUMBER:  [id number]  AGE: 68 y.o.   GENDER: male  : 1955  TODAY'S DATE:  2024    Subjective: patient feeling better, eating but not wanting to work with therapy.     Objective:    Vitals:    24 1100   BP: (!) 95/45   Pulse: 86   Resp: 15   Temp:    SpO2: 92%     Lab Results   Component Value Date    WBC 9.8 2024    HGB 9.8 (L) 2024    HCT 30.1 (L) 2024    MCV 92.2 2024     (L) 2024     Lab Results   Component Value Date    CREATININE 0.7 (L) 2024    BUN 26 (H) 2024     (L) 2024    K 3.8 2024    CL 86 (L) 2024    CO2 34 (H) 2024     Lab Results   Component Value Date     (H) 2024     (H) 2024    ALKPHOS 158 (H) 2024    BILITOT 0.8 2024       Plan: I met with patient this am to discuss possible inpatient hospice as a transition to home. I did explain, he will be transferred by ambulance, does not need to get up and out of bed.  He is concerned about missing meals I assured him he will not miss any meals. I called Crystal she will call me back.   Hospice liaison following up with patient about going to inpatient.        Code Status: DNR-CC  Discharge Environment:  [] Hospice Consult Agency:  [] Inpatient Hospice    [] Home with Hospice Care   [] ECF with Hospice  [] ECF skilled care with Hospice to follow   [] Other:    Teaching Time:  0hours  30 min     I will continue to follow Mr. Tee's care as needed.      Thank you for allowing me to participate in the care of Mr. Tee .     Electronically signed by Riana Dodd, RN, BSN,CHPN on 2024 at 11:17 AM  Palliative Care Nurse [facility]  Office: [phone]      
0030: Mary Kate Browning NP updated via [employer] after no improvement in blood pressure after NS bolus completed. Momo ordered.   0033: Dr. Thomas to bedside. Per Dr. Thomas, hold Momo for now. Mary Kate Browning NP aware.  
0246: Cardiology consult called into [facility]  0301: Call from Dr. Tarun Serrano. Levophed ordered. See MAR.   
4 Eyes Skin Assessment     NAME:  Don Tee  YOB: 1955  MEDICAL RECORD NUMBER:  [id number]    The patient is being assessed for  Admission    I agree that at least one RN has performed a thorough Head to Toe Skin Assessment on the patient. ALL assessment sites listed below have been assessed.      Areas assessed by both nurses:    Head, Face, Ears, Shoulders, Back, Chest, Arms, Elbows, Hands, Sacrum. Buttock, Coccyx, Ischium, Legs. Feet and Heels, and Under Medical Devices         Does the Patient have a Wound? Yes wound(s) were present on assessment. LDA wound assessment was Initiated and completed by RN       Toney Prevention initiated by RN: Yes  Wound Care Orders initiated by RN: Yes    Pressure Injury (Stage 3,4, Unstageable, DTI, NWPT, and Complex wounds) if present, place Wound referral order by RN under : Yes    New Ostomies, if present place, Ostomy referral order under : No     Nurse 1 eSignature: Electronically signed by Isidro Villarreal RN on 11/2/24 at 3:44 AM EDT    **SHARE this note so that the co-signing nurse can place an eSignature**    Nurse 2 eSignature: Electronically signed by Rashaun Aguilera RN on 11/2/24 at 6:41 AM EDT   
Albumin and digoxin given, MAPs remain in 40s-50s. PerfectServe message sent to Dr. Shana Thomas. Milrinone infusion started.  
At 0000 assessment, chest tube assessed. All findings WNL. At 0102, patient desaturated to 88%. This RN assessed the patient, and found the chest tube disconnected. Chest tube immediately reconnected, pt placed on 15L HFNC. Patient not in apparent distress, denies respiratory symptoms. Patient  William.to bedside, stat chest x-ray ordered. Repeat CXR @ 0630.     Electronically signed by Isidro Villarreal RN on 11/4/2024 at 5:39 AM     
Clinical Pharmacy Note  Heparin Dosing       Lab Results   Component Value Date/Time    ANTIXAUHEP 0.22 11/02/2024 05:13 PM      Lab Results   Component Value Date/Time    HGB 12.8 11/02/2024 04:45 AM    HCT 38.6 11/02/2024 04:45 AM     11/02/2024 04:45 AM    INR 2.62 11/01/2024 07:08 PM       Current Infusion Rate: 1510 units/hr    Plan:  Bolus: 2000 units  Rate: 1660 units/hr  Next anti-Xa level: 0000 11/3/24    Pharmacy will continue to monitor and adjust based on anti-Xa results.   
Clinical Pharmacy Note  Heparin Dosing       Lab Results   Component Value Date/Time    ANTIXAUHEP 0.24 11/06/2024 04:20 AM      Lab Results   Component Value Date/Time    HGB 11.1 11/05/2024 04:55 AM    HCT 32.8 11/05/2024 04:55 AM    PLT 97 11/05/2024 04:55 AM    INR 2.62 11/01/2024 07:08 PM       Current Infusion Rate: 1660 units/hr    Plan:  Bolus: 2000 units  Rate: increase to 1800 units/hr  Next anti-Xa level: 1200 11/6/24    Pharmacy will continue to monitor and adjust based on anti-Xa results.   Rico Chapa, PharmD  
Clinical Pharmacy Note  Heparin Dosing       Lab Results   Component Value Date/Time    ANTIXAUHEP 0.29 11/07/2024 04:08 AM      Lab Results   Component Value Date/Time    HGB 9.8 11/07/2024 04:08 AM    HCT 30.1 11/07/2024 04:08 AM     11/07/2024 04:08 AM    INR 2.62 11/01/2024 07:08 PM       Current Infusion Rate: 1800 units/hr    Plan:  Bolus: 2000 units  Rate: 1950 units/hr  Next anti-Xa level: 1200 11/07/24    Pharmacy will continue to monitor and adjust based on anti-Xa results.    Whitney Moses, CintiaD    
Clinical Pharmacy Note  Heparin Dosing       Lab Results   Component Value Date/Time    ANTIXAUHEP 0.33 11/04/2024 06:00 AM      Lab Results   Component Value Date/Time    HGB 12.5 11/04/2024 04:25 AM    HCT 36.9 11/04/2024 04:25 AM     11/04/2024 04:25 AM    INR 2.62 11/01/2024 07:08 PM       Current Infusion Rate: 1660 units/hr    Plan:  Rate: Continue at 1660 units/hr  Next anti-Xa level: 0600  11/5/24    Pharmacy will continue to monitor and adjust based on anti-Xa results.    Brian Craven, [facility]  11/4/2024 6:55 AM    
Clinical Pharmacy Note  Heparin Dosing       Lab Results   Component Value Date/Time    ANTIXAUHEP 0.34 11/05/2024 04:55 AM      Lab Results   Component Value Date/Time    HGB 11.1 11/05/2024 04:55 AM    HCT 32.8 11/05/2024 04:55 AM     11/04/2024 04:25 AM    INR 2.62 11/01/2024 07:08 PM       Current Infusion Rate: 1660 units/hr    Plan:  Rate: continue at 1660 units/hr  Next anti-Xa level: 0600 11/6/24    Pharmacy will continue to monitor and adjust based on anti-Xa results.  Rico Chapa, PharmD  
Clinical Pharmacy Note  Heparin Dosing       Lab Results   Component Value Date/Time    ANTIXAUHEP 0.35 11/03/2024 06:17 AM      Lab Results   Component Value Date/Time    HGB 11.8 11/03/2024 04:30 AM    HCT 36.1 11/03/2024 04:30 AM     11/03/2024 04:30 AM    INR 2.62 11/01/2024 07:08 PM       Current Infusion Rate: 1660 units/hr    Plan:  Rate: continue at 1660 units/hr  Next anti-Xa level: 0600  11/4/24    Pharmacy will continue to monitor and adjust based on anti-Xa results.    Shai Patiño RP, PRS 11/3/2024  8:26 AM      
Clinical Pharmacy Note  Heparin Dosing       Lab Results   Component Value Date/Time    ANTIXAUHEP 0.41 11/03/2024 12:15 AM      Lab Results   Component Value Date/Time    HGB 12.8 11/02/2024 04:45 AM    HCT 38.6 11/02/2024 04:45 AM     11/02/2024 04:45 AM    INR 2.62 11/01/2024 07:08 PM       Current Infusion Rate: 1660 units/hr    Plan:  Rate: continue at 1660 units/hr  Next anti-Xa level: 0800 11/3/24    Pharmacy will continue to monitor and adjust based on anti-Xa results.  Rico Chapa, PharmD  
Clinical Pharmacy Note  Heparin Dosing       Lab Results   Component Value Date/Time    ANTIXAUHEP 0.44 11/06/2024 12:18 PM      Lab Results   Component Value Date/Time    HGB 11.1 11/05/2024 04:55 AM    HCT 32.8 11/05/2024 04:55 AM    PLT 97 11/05/2024 04:55 AM    INR 2.62 11/01/2024 07:08 PM       Current Infusion Rate: 1800 units/hr    Plan:  Rate: continue at 1800 units/hr  Next anti-Xa level: 1800 11/6/24    Pharmacy will continue to monitor and adjust based on anti-Xa results.   
Clinical Pharmacy Note  Heparin Dosing       Lab Results   Component Value Date/Time    ANTIXAUHEP 0.56 11/06/2024 06:43 PM      Lab Results   Component Value Date/Time    HGB 11.1 11/05/2024 04:55 AM    HCT 32.8 11/05/2024 04:55 AM    PLT 97 11/05/2024 04:55 AM    INR 2.62 11/01/2024 07:08 PM       Current Infusion Rate: 1800 units/hr    Plan:  Rate: continue at 1800 units/hr  Next anti-Xa level: 0600 11/7/24    Pharmacy will continue to monitor and adjust based on anti-Xa results.   
Clinical Pharmacy Note  Heparin Dosing       Lab Results   Component Value Date/Time    ANTIXAUHEP 0.56 11/07/2024 12:30 PM      Lab Results   Component Value Date/Time    HGB 9.8 11/07/2024 04:08 AM    HCT 30.1 11/07/2024 04:08 AM     11/07/2024 04:08 AM    INR 2.62 11/01/2024 07:08 PM       Current Infusion Rate: 1950 units/hr    Plan:  Rate: 1950 units/hr  Next anti-Xa level: 1800 11/7/24    Pharmacy will continue to monitor and adjust based on anti-Xa results.   
Clinical Pharmacy Note  Heparin Dosing       Lab Results   Component Value Date/Time    ANTIXAUHEP <0.10 11/02/2024 01:52 AM      Lab Results   Component Value Date/Time    HGB 14.5 11/01/2024 04:12 PM    HCT 46.0 11/01/2024 04:12 PM     11/01/2024 04:12 PM    INR 2.62 11/01/2024 07:08 PM       Current Infusion Rate: 910 units/hr    Plan:  Bolus: 4000 units  Rate: increase to 1210 units/hr  Next anti-Xa level: 0900 11/2/24    Pharmacy will continue to monitor and adjust based on anti-Xa results.  Rico Chapa, PharmD  
Clinical Pharmacy Note  Heparin Dosing       Lab Results   Component Value Date/Time    ANTIXAUHEP <0.10 11/02/2024 08:17 AM      Lab Results   Component Value Date/Time    HGB 12.8 11/02/2024 04:45 AM    HCT 38.6 11/02/2024 04:45 AM     11/02/2024 04:45 AM    INR 2.62 11/01/2024 07:08 PM       Current Infusion Rate: 1210 units/hr    Plan:  Bolus: 4000 units  Rate: 1510 units/hr  Next anti-Xa level: 1600 11/2/24    Pharmacy will continue to monitor and adjust based on anti-Xa results.   
Clinical Pharmacy Note  Heparin Dosing Consult    Don Tee is a 68 y.o. male ordered heparin per VTE/DVT/PE Nomogram by Dr. Costa.     Lab Results   Component Value Date/Time    ANTIXAUHEP <0.10 07/10/2024 01:19 PM      Lab Results   Component Value Date/Time    HGB 14.5 11/01/2024 04:12 PM    HCT 46.0 11/01/2024 04:12 PM     11/01/2024 04:12 PM       Ht Readings from Last 1 Encounters:   11/01/24 1.829 m (6')        Wt Readings from Last 1 Encounters:   11/01/24 75.7 kg (166 lb 14.2 oz)        Assessment/Plan:  Initial bolus: 4000 units  Initial infusion rate: 910 units/hr  Next anti-Xa: 0200 11/2/24    Pharmacy will continue to monitor adjust heparin based on anti-Xa results using nomogram below:     VTE/DVT/PE Heparin Nomogram     Initial Bolus: 80 units/kg Max Bolus: 10,000 units       Initial Rate: 18 units/kg/hr Max Initial Rate: 2,100 units/hr     anti-Xa Bolus Titration   < 0.1 Heparin 80 units/kg bolus Increase drip by 4 units/kg/hr   0.1 - 0.29 Heparin 40 units/kg bolus Increase drip by 2 units/kg/hr   0.3 - 0.7 No Bolus No Change   0.71 - 0.8 No Bolus Decrease drip by 1 units/kg/hr   0.81 - 0.99 No Bolus Decrease drip by 2 units/kg/hr   > 1 Hold Heparin for 1 hour Decrease drip by 3 units/kg/hr       Obtain anti-Xa 6 hours after initial bolus and 6 hours after any dose change until two consecutive therapeutic anti-Xa levels are achieved - then daily.     Bonnie Ng RPH, PharmD 11/1/2024 7:10 PM  
Clinical Pharmacy Note  Renal Dose Adjustment    Don Tee is receiving Cefepime 2 gm IVPB Q12H. This medication is renally eliminated.  Based on the patient's Estimated Creatinine Clearance: 65 mL/min (based on SCr of 1 mg/dL). and urine output, the dose has been adjusted to Cefepime 2 gm IVPB Q8H per protocol.    Pharmacy will continue to monitor and adjust dose as needed for changes in renal function.       Israel Jenkins RP, PharmD, BCPS  11/2/2024 8:53 AM    
Dr. Shana Thomas to bedside. Albumin 25% and digoxin 250 mcg ordered. This RN discussed patient's conflicting code status with MD. Patient is a full code in the chart, but has DNR documents from 7/2024. When asked about code status, patient states \"I don't want to talk about this right now\". Per Dr. Thomas, keep patient full code status.    Electronically signed by Isidro Villarreal RN on 11/3/2024 at 12:49 AM   
[facility] [place]      Spoke with Crystal patient's HCPOA -plan is to wean levophed iv meds and then hopefully be able to transition to home with hospice. HOC RN will follow up daily to assist with discharge.      
Met with Don to discuss hospice inpatient care center with goal of possibly going home from there.  Called Lyndsey and we discussed this via speaker phone.  Lyndsey and Don are in agreement with plan to go to [facility] inpatient care center.  [facility] scheduled to transport patient at 1800.  Please leave PICC, IV, and rouse in place.  Please call with any questions.    Janine Menjivar RN  Hospital Liaison   [phone]     
Met with patient and his friend/HCPOA Lyndsey to discuss hospice philosophy, services, and level of care.  Discussed patient goal of returning home and what hospice services would look like at home vs hospice in the inpatient care center.  Discussed need to re-evaluate which setting is best after levophed is discontinued.  Discussed possible d/c with hospice and continued milrinone infusion.  Patient and friend to continue discussing whether they are ready to discontinue aggressive care and pursue hospice.  Lyndsey has phone number to reach this RN.  Hospital liaison to continue to follow up.  Please call with any needs.    Janine Menjivar RN  Hospital Liaison   [phone]         
Milrinone infusion started @ 0133. 15 and 30 minute updates sent to Dr. Thomas via [facility]. MAPs remain in 40s-50s. No new orders at this time.    
NAME:  Don Tee  YOB: 1955  MEDICAL RECORD NUMBER:  [id number]    Shift Summary: Weaned oxygen to 2L. Good output from chest tube; changed out atrium collection device. Slowly weaning levophed, tolerating well. Good urine output. Palliative Care at bedside to see patient; changed code status to DNR-CC. Hospice consulted.    Family updated: Yes:  MIKELyndsey SAENZ updated via phone.    Rhythm: Normal Sinus Rhythm     Most recent vitals:   Visit Vitals  BP (!) 104/54   Pulse 80   Temp 97.5 °F (36.4 °C) (Axillary)   Resp 14   Ht 1.829 m (6')   Wt 63.9 kg (140 lb 14 oz)   SpO2 96%   BMI 19.11 kg/m²        Respiratory support needed (if any):  - O2 - NC - 2 lpm    Admission weight Weight - Scale: 75.7 kg (166 lb 14.2 oz) (11/01/24 1535)    Today's weight    Wt Readings from Last 1 Encounters:   11/04/24 63.9 kg (140 lb 14 oz)        Rouse need assessed each shift: YES -  - continue rouse r/t - need for fluid management in critically ill patient.  UOP >30ml/hr: YES  Last documented BM (in last 48 hrs):  No data found.             Restraints (in use currently or dc'd in last 12 hrs): No    Order current and documentation up to date? N/A    Lines/Drains reviewed @ bedside.  PICC 11/02/24 Right Basilic (Active)   Number of days: 2       Peripheral IV 11/02/24 Left;Posterior Wrist (Active)   Number of days: 2       Continuous Glucose Monitor  11/04/24 (Active)   Number of days: 0       Urinary Catheter 11/01/24 Rouse (Active)   Number of days: 2         Drip rates at handoff:    milrinone 0.25 mcg/kg/min (11/04/24 1856)    norepinephrine 14 mcg/min (11/04/24 1856)    dextrose      amiodarone 0.5 mg/min (11/04/24 1856)    heparin (PORCINE) Infusion 1,660 Units/hr (11/04/24 1856)    dextrose 5 % and 0.45 % NaCl      sodium chloride         Lab Data:   CBC:   Recent Labs     11/03/24  0430 11/04/24  0425   WBC 12.5* 10.2   HGB 11.8* 12.5*   HCT 36.1* 36.9*   MCV 93.8 93.1    133*     BMP:    Recent Labs     
NAME:  Don Tee  YOB: 1955  MEDICAL RECORD NUMBER:  [id number]    Shift Summary: Shift uneventful. Levo titrated per order. Amio remains on. Milrinone remains on. BP's remain stable with vasopressors. Mainly in NSR but converted in and out of afib a few times.    Family updated: No    Rhythm: Atrial Fibrillation  converts in and out of NSR     Most recent vitals:   Visit Vitals  /61   Pulse 78   Temp 97 °F (36.1 °C) (Axillary)   Resp 16   Ht 1.829 m (6')   Wt 64.1 kg (141 lb 5 oz)   SpO2 96%   BMI 19.17 kg/m²           No data found.    No data found.      Respiratory support needed (if any):  - O2 - HFNC 5 lpm    Admission weight Weight - Scale: 75.7 kg (166 lb 14.2 oz) (11/01/24 1535)    Today's weight    Wt Readings from Last 1 Encounters:   11/03/24 64.1 kg (141 lb 5 oz)        Rouse need assessed each shift: YES -  - continue rouse r/t - strict fluid volume management  UOP >30ml/hr: YES  Last documented BM (in last 48 hrs):  No data found.             Restraints (in use currently or dc'd in last 12 hrs): No    Order current and documentation up to date?     Lines/Drains reviewed @ bedside.  PICC 11/02/24 Right Basilic (Active)   Number of days: 1       Peripheral IV 11/02/24 Left;Posterior Wrist (Active)   Number of days: 1       Urinary Catheter 11/01/24 Rouse (Active)   Number of days: 1         Drip rates at handoff:    milrinone 0.25 mcg/kg/min (11/03/24 1605)    norepinephrine 18 mcg/min (11/03/24 1605)    dextrose      amiodarone 0.5 mg/min (11/03/24 1605)    heparin (PORCINE) Infusion 1,660 Units/hr (11/03/24 1605)    dextrose 5 % and 0.45 % NaCl      sodium chloride         Lab Data:   CBC:   Recent Labs     11/02/24  0445 11/03/24  0430   WBC 13.4* 12.5*   HGB 12.8* 11.8*   HCT 38.6* 36.1*   MCV 94.1 93.8    166     BMP:    Recent Labs     11/02/24  0445 11/03/24  0430    133*   K 4.8 3.2*   CO2 24 27   BUN 62* 48*   CREATININE 1.0 0.9     LIVR:   Recent Labs     
NAME:  Don Tee  YOB: 1955  MEDICAL RECORD NUMBER:  [id number]    Shift Summary: Prolonged QT. Converted into afib RVR, highest rate 120s. Amio bolus and fluid bolus. Albumin and digoxin given. Milrinone gtt. Levo gtt. BP improved with levo.     Family updated: No    Rhythm: Atrial Fibrillation  vs Sinus Rhythm    Most recent vitals:   Visit Vitals  BP 99/62   Pulse (!) 107   Temp (!) 96.2 °F (35.7 °C) (Axillary)   Resp (!) 6   Ht 1.829 m (6')   Wt 64.1 kg (141 lb 5 oz)   SpO2 98%   BMI 19.17 kg/m²           No data found.    No data found.      Respiratory support needed (if any):  - O2 - HFNC 6 lpm    Admission weight Weight - Scale: 75.7 kg (166 lb 14.2 oz) (11/01/24 1535)    Today's weight    Wt Readings from Last 1 Encounters:   11/03/24 64.1 kg (141 lb 5 oz)        Rouse need assessed each shift: YES -  - continue rouse r/t - fluid volume management of critically ill pt  UOP >30ml/hr: YES  Last documented BM (in last 48 hrs):  No data found.             Restraints (in use currently or dc'd in last 12 hrs): No    Order current and documentation up to date? Yes    Lines/Drains reviewed @ bedside.  PICC 11/02/24 Right Basilic (Active)   Number of days: 1       Peripheral IV 11/02/24 Left;Posterior Wrist (Active)   Number of days: 1       Urinary Catheter 11/01/24 Rouse (Active)   Number of days: 1         Drip rates at handoff:    milrinone 0.25 mcg/kg/min (11/03/24 0318)    norepinephrine 26 mcg/min (11/03/24 0432)    dextrose      amiodarone 0.5 mg/min (11/03/24 0425)    heparin (PORCINE) Infusion 1,660 Units/hr (11/02/24 1755)    dextrose 5 % and 0.45 % NaCl      sodium chloride         Lab Data:   CBC:   Recent Labs     11/02/24  0445 11/03/24  0430   WBC 13.4* 12.5*   HGB 12.8* 11.8*   HCT 38.6* 36.1*   MCV 94.1 93.8    166     BMP:    Recent Labs     11/02/24  0445 11/03/24  0430    133*   K 4.8 3.2*   CO2 24 27   BUN 62* 48*   CREATININE 1.0 0.9     LIVR:   Recent Labs     
NAME:  Don Tee  YOB: 1955  MEDICAL RECORD NUMBER:  [id number]    Shift Summary: Pt slept most of the night, unable to wean down Levophed. 250 ml of bloody drainage with clots at times from left chest tube.    Family updated: No    Rhythm: Normal Sinus Rhythm with first degree AV block vs afib. Did have some EKG changes around 0530 where it looked like pt flipped into afib on the monitor and had some significant ST elevation. Stat EKG was drawn which showed sinus rhythm with PACs. MD notified.    Most recent vitals:   Visit Vitals  BP (!) 106/50   Pulse 80   Temp 97.7 °F (36.5 °C) (Axillary)   Resp 16   Ht 1.829 m (6')   Wt 64.7 kg (142 lb 10.2 oz)   SpO2 96%   BMI 19.35 kg/m²             Respiratory support needed (if any):  - O2 - NC - 2 lpm    Admission weight Weight - Scale: 75.7 kg (166 lb 14.2 oz) (11/01/24 1535)    Today's weight    Wt Readings from Last 1 Encounters:   11/05/24 64.7 kg (142 lb 10.2 oz)        Rouse need assessed each shift: YES -  - continue rouse r/t - strict I&O  UOP >30ml/hr: YES total of 2830 ml from rouse catheter for shift (avg 236 ml/hr)  Last documented BM (in last 48 hrs):  No data found.             Restraints (in use currently or dc'd in last 12 hrs): No      Lines/Drains reviewed @ bedside.  PICC 11/02/24 Right Basilic (Active)   Number of days: 3       Continuous Glucose Monitor  11/04/24 (Active)   Number of days: 1       Urinary Catheter 11/01/24 Rouse (Active)   Number of days: 3         Drip rates at handoff:    milrinone 0.25 mcg/kg/min (11/05/24 0625)    norepinephrine 13 mcg/min (11/05/24 0620)    dextrose      amiodarone 0.5 mg/min (11/05/24 0620)    heparin (PORCINE) Infusion 1,660 Units/hr (11/05/24 0624)    dextrose 5 % and 0.45 % NaCl      sodium chloride         Lab Data:   CBC:   Recent Labs     11/04/24  0425 11/05/24  0455   WBC 10.2 9.1   HGB 12.5* 11.1*   HCT 36.9* 32.8*   MCV 93.1 91.9   * 97*     BMP:    Recent Labs     
NAME:  Don Tee  YOB: 1955  MEDICAL RECORD NUMBER:  [id number]    Shift Summary: levo titrating down, parameters changed to systolic of 100, amio turned off, multiple electrolytes replaced. Hospice spoke with patient, patient's goal is to go home with hospice. Chest tube flushed by Dr Roger d/t concern for clots, output slowing down, only 40mLs out this shift. Continuing milrinone and diuresis.     Family updated: Yes:  neighbor Lyndsey Jensen (primary decision maker)    Rhythm: Normal Sinus Rhythm     Most recent vitals:   Visit Vitals  BP (!) 110/52   Pulse 87   Temp 98.2 °F (36.8 °C) (Axillary)   Resp 18   Ht 1.829 m (6')   Wt 64.7 kg (142 lb 10.2 oz)   SpO2 93%   BMI 19.35 kg/m²           No data found.    No data found.      Respiratory support needed (if any):  - O2 - NC - 2 lpm    Admission weight Weight - Scale: 75.7 kg (166 lb 14.2 oz) (11/01/24 1535)    Today's weight    Wt Readings from Last 1 Encounters:   11/05/24 64.7 kg (142 lb 10.2 oz)        Rouse need assessed each shift: YES -  - continue rouse r/t - urinary retention  UOP >30ml/hr: YES  Last documented BM (in last 48 hrs):  No data found.             Restraints (in use currently or dc'd in last 12 hrs): No    Order current and documentation up to date? Yes    Lines/Drains reviewed @ bedside.  PICC 11/02/24 Right Basilic (Active)   Number of days: 3       Peripheral IV 11/05/24 Left;Proximal;Anterior Forearm (Active)   Number of days: 0       Continuous Glucose Monitor  11/04/24 (Active)   Number of days: 1       Urinary Catheter 11/01/24 Rouse (Active)   Number of days: 3         Drip rates at handoff:    norepinephrine 6 mcg/min (11/05/24 1743)    milrinone 0.25 mcg/kg/min (11/05/24 1743)    dextrose      heparin (PORCINE) Infusion 1,660 Units/hr (11/05/24 1743)    sodium chloride Stopped (11/05/24 1713)       Lab Data:   CBC:   Recent Labs     11/04/24  0425 11/05/24  0455   WBC 10.2 9.1   HGB 12.5* 11.1*   HCT 36.9* 32.8* 
NAME:  Don Tee  YOB: 1955  MEDICAL RECORD NUMBER:  [id number]    Shift Summary: Pt titrated down to 2 mcg levo. Heparin bolus given and gtt adjusted. VSS.    Family updated: Yes:  Crystal POA    Rhythm: Normal Sinus Rhythm     Most recent vitals:   Visit Vitals  BP (!) 92/39   Pulse 82   Temp 97.4 °F (36.3 °C) (Oral)   Resp 15   Ht 1.829 m (6')   Wt 65.3 kg (143 lb 15.4 oz)   SpO2 98%   BMI 19.52 kg/m²           No data found.    No data found.      Respiratory support needed (if any):  - O2 - HFNC 7 lpm    Admission weight Weight - Scale: 75.7 kg (166 lb 14.2 oz) (11/01/24 1535)    Today's weight    Wt Readings from Last 1 Encounters:   11/07/24 65.3 kg (143 lb 15.4 oz)        Rouse need assessed each shift: YES -  - continue rouse r/t - strict I&O, diuresis  UOP >30ml/hr: YES  Last documented BM (in last 48 hrs):  No data found.             Restraints (in use currently or dc'd in last 12 hrs): No    Order current and documentation up to date? No    Lines/Drains reviewed @ bedside.  PICC 11/02/24 Right Basilic (Active)   Number of days: 5       Peripheral IV 11/05/24 Left;Proximal;Anterior Forearm (Active)   Number of days: 1       Continuous Glucose Monitor  11/04/24 (Active)   Number of days: 2       Urinary Catheter 11/01/24 Rouse (Active)   Number of days: 5         Drip rates at handoff:    norepinephrine 2 mcg/min (11/07/24 0602)    milrinone 0.25 mcg/kg/min (11/07/24 0602)    dextrose      heparin (PORCINE) Infusion 1,950 Units/hr (11/07/24 0602)    sodium chloride Stopped (11/06/24 0621)       Lab Data:   CBC:   Recent Labs     11/05/24  0455 11/07/24  0408   WBC 9.1 9.8   HGB 11.1* 9.8*   HCT 32.8* 30.1*   MCV 91.9 92.2   PLT 97* 105*     BMP:    Recent Labs     11/06/24  0420 11/07/24  0408   * 128*   K 4.3 3.8   CO2 33* 34*   BUN 19 26*   CREATININE 0.7* 0.7*     LIVR:   Recent Labs     11/05/24  0455 11/07/24 0408   * 154*   ALT 1,142* 600*     PT/INR: No results for 
NAME:  Don Tee  YOB: 1955  MEDICAL RECORD NUMBER:  [id number]    Shift Summary: Chest tube removed, levophed weaned to 4 mcg/min. Midodrine ordered to help BP.     Family updated: Yes:  POA called and updated.    Rhythm: Atrial Fibrillation     Most recent vitals:   Visit Vitals  BP (!) 97/56   Pulse 86   Temp 98.2 °F (36.8 °C) (Oral)   Resp 17   Ht 1.829 m (6')   Wt 65.8 kg (145 lb 1 oz)   SpO2 94%   BMI 19.67 kg/m²           No data found.    No data found.      Respiratory support needed (if any):  - O2 - NC - 4 lpm    Admission weight Weight - Scale: 75.7 kg (166 lb 14.2 oz) (11/01/24 1535)    Today's weight    Wt Readings from Last 1 Encounters:   11/06/24 65.8 kg (145 lb 1 oz)        Rouse need assessed each shift: YES -  - continue rouse r/t - Strict Is and Os   UOP >30ml/hr: YES  Last documented BM (in last 48 hrs):  No data found.             Restraints (in use currently or dc'd in last 12 hrs): No    Order current and documentation up to date? No    Lines/Drains reviewed @ bedside.  PICC 11/02/24 Right Basilic (Active)   Number of days: 4       Peripheral IV 11/05/24 Left;Proximal;Anterior Forearm (Active)   Number of days: 1       Continuous Glucose Monitor  11/04/24 (Active)   Number of days: 2       Urinary Catheter 11/01/24 Rouse (Active)   Number of days: 5         Drip rates at handoff:    norepinephrine 4 mcg/min (11/06/24 1906)    milrinone 0.25 mcg/kg/min (11/06/24 1906)    dextrose      heparin (PORCINE) Infusion 1,800 Units/hr (11/06/24 1906)    sodium chloride Stopped (11/06/24 0621)       Lab Data:   CBC:   Recent Labs     11/04/24 0425 11/05/24  0455   WBC 10.2 9.1   HGB 12.5* 11.1*   HCT 36.9* 32.8*   MCV 93.1 91.9   * 97*     BMP:    Recent Labs     11/05/24  2003 11/06/24  0420   * 128*   K 3.4* 4.3   CO2 32 33*   BUN 19 19   CREATININE 0.8 0.7*     LIVR:   Recent Labs     11/04/24 0425 11/05/24  0455   * 551*   ALT 1,182* 1,142*     PT/INR: No 
NAME:  Don Tee  YOB: 1955  MEDICAL RECORD NUMBER:  [id number]    Shift Summary: Chest tube disconnected, moderate to large left pneumothorax. Levo remains on. Pt remained in normal sinus throughout the night    Family updated: No    Rhythm: Normal Sinus Rhythm     Most recent vitals:   Visit Vitals  BP (!) 100/52   Pulse 93   Temp 97.8 °F (36.6 °C) (Axillary)   Resp 18   Ht 1.829 m (6')   Wt 63.9 kg (140 lb 14 oz)   SpO2 (!) 88%   BMI 19.11 kg/m²           No data found.    No data found.      Respiratory support needed (if any):  - O2 - HFNC 8 lpm    Admission weight Weight - Scale: 75.7 kg (166 lb 14.2 oz) (11/01/24 1535)    Today's weight    Wt Readings from Last 1 Encounters:   11/04/24 63.9 kg (140 lb 14 oz)        Rouse need assessed each shift: YES -  - continue rouse r/t - strict I&Os  UOP >30ml/hr: YES  Last documented BM (in last 48 hrs):  No data found.             Restraints (in use currently or dc'd in last 12 hrs): No    Order current and documentation up to date? Yes    Lines/Drains reviewed @ bedside.  PICC 11/02/24 Right Basilic (Active)   Number of days: 2       Peripheral IV 11/02/24 Left;Posterior Wrist (Active)   Number of days: 2       Urinary Catheter 11/01/24 Rouse (Active)   Number of days: 2         Drip rates at handoff:    milrinone 0.25 mcg/kg/min (11/03/24 2040)    norepinephrine 30 mcg/min (11/04/24 0357)    dextrose      amiodarone 0.5 mg/min (11/04/24 0356)    heparin (PORCINE) Infusion 1,660 Units/hr (11/04/24 0200)    dextrose 5 % and 0.45 % NaCl      sodium chloride         Lab Data:   CBC:   Recent Labs     11/03/24 0430 11/04/24 0425   WBC 12.5* 10.2   HGB 11.8* 12.5*   HCT 36.1* 36.9*   MCV 93.8 93.1    133*     BMP:    Recent Labs     11/03/24 0430 11/04/24 0425   * 133*   K 3.2* 2.9*   CO2 27 31   BUN 48* 30*   CREATININE 0.9 0.7*     LIVR:   Recent Labs     11/03/24 0430 11/04/24 0425   * 519*   ALT 1,241* 1,182*     PT/INR: 
NAME:  Don Tee  YOB: 1955  MEDICAL RECORD NUMBER:  [id number]    Shift Summary: Pt has been in sinus rhythm with first degree AV block. Have been unable to wean down Levophed. Heparin sub-therapeutic this am, rate increased to 1800 units/hr and pt given 2000 unit Heparin bolus IVP per pharmacist. Next level due at 1200. Pt has not had a BM since before being admitted 11/1. Refused Miralax last night, stated he would consider taking it in the am. Electrolytes covering per sliding scale orders. Still checking renal panels every 8 hours.     Family updated: Yes:  BETO Solorio came by to visit last night and was updated.    Rhythm: Normal Sinus Rhythm  with 1st degree AV block.     Most recent vitals:   Visit Vitals  BP (!) 107/50   Pulse 86   Temp 98.1 °F (36.7 °C) (Oral)   Resp 18   Ht 1.829 m (6')   Wt 65.8 kg (145 lb 1 oz)   SpO2 92%   BMI 19.67 kg/m²            Respiratory support needed (if any):  - O2 - NC - 4 lpm    Admission weight Weight - Scale: 75.7 kg (166 lb 14.2 oz) (11/01/24 1535)    Today's weight    Wt Readings from Last 1 Encounters:   11/06/24 65.8 kg (145 lb 1 oz)        Rouse need assessed each shift: YES -  - continue rouse r/t - strict I&O  UOP >30ml/hr: YES total of 1760 ml from rouse catheter for shift (avg 147 ml/hr)  Last documented BM (in last 48 hrs):  No data found.             Restraints (in use currently or dc'd in last 12 hrs): No        Lines/Drains reviewed @ bedside.  PICC 11/02/24 Right Basilic (Active)   Number of days: 4       Peripheral IV 11/05/24 Left;Proximal;Anterior Forearm (Active)   Number of days: 0       Continuous Glucose Monitor  11/04/24 (Active)   Number of days: 2       Urinary Catheter 11/01/24 Rouse (Active)   Number of days: 4         Drip rates at handoff:    norepinephrine 8 mcg/min (11/06/24 0734)    milrinone 0.25 mcg/kg/min (11/06/24 0631)    dextrose      heparin (PORCINE) Infusion 1,800 Units/hr (11/06/24 0631)    sodium chloride 
NAME:  Don Tee  YOB: 1955  MEDICAL RECORD NUMBER:  [id number]    Shift Summary: Pt admitted from home, in DKA, SOB, and hypoxia. GABRIELLE. Insulin gtt started. 2500ml drained from CT, atrium replacced. A-fib RVR in ED in the 190s, cardioverted x 1. Tx ICU. LA 5.0. Afib RVR from 2315, amio bolus and gtt started. 11/2: Digoxin 250mcg. HR back to 90s at 0145. Converted to NSR. Insulin gtt off.     Family updated: No    Rhythm: Normal Sinus Rhythm     Most recent vitals:   Visit Vitals  /60   Pulse 85   Temp 97.6 °F (36.4 °C) (Axillary)   Resp 20   Ht 1.829 m (6')   Wt 65.6 kg (144 lb 10 oz)   SpO2 98%   BMI 19.61 kg/m²           No data found.    No data found.      Respiratory support needed (if any):  - O2 - HFNC 8 lpm    Admission weight Weight - Scale: 75.7 kg (166 lb 14.2 oz) (11/01/24 1535)    Today's weight    Wt Readings from Last 1 Encounters:   11/01/24 65.6 kg (144 lb 10 oz)        Rouse need assessed each shift: YES -  - continue rouse r/t - strict I&Os  UOP >30ml/hr: YES  Last documented BM (in last 48 hrs):  No data found.             Restraints (in use currently or dc'd in last 12 hrs): No    Order current and documentation up to date? Yes    Lines/Drains reviewed @ bedside.  PICC 11/02/24 Right Basilic (Active)   Number of days: 0       Peripheral IV 11/01/24 Right Wrist (Active)   Number of days: 0       Peripheral IV 11/02/24 Left;Posterior Wrist (Active)   Number of days: 0       Urinary Catheter 11/01/24 Rouse (Active)   Number of days: 0         Drip rates at handoff:    dextrose      amiodarone      Followed by    amiodarone 0.5 mg/min (11/02/24 0431)    heparin (PORCINE) Infusion 1,210 Units/hr (11/02/24 0245)    dextrose 5 % and 0.45 % NaCl      sodium chloride         Lab Data:   CBC:   Recent Labs     11/01/24  1612 11/02/24 0445   WBC 10.4 13.4*   HGB 14.5 12.8*   HCT 46.0 38.6*   MCV 98.9 94.1    182     BMP:    Recent Labs     11/02/24  0036 11/02/24 0445   NA 
Occupational Therapy      Chart reviewed; spoke with nursing. Hospice mtg with pt today. Will follow-up as schedule and pt condition permit.    Electronically signed by Maria A Milian OT on 11/5/2024 at 11:23 AM    
Occupational Therapy  Facility/Department: [address] ICU  Occupational Therapy Initial Assessment    Name: Don Tee  : 1955  MRN: [id number]  Date of Service: 2024    Discharge Recommendations:  3-5 sessions per week, Patient would benefit from continued therapy after discharge        Don Tee scored a  on the AM-PAC ADL Inpatient form. Current research shows that an AM-PAC score of 17 or less is typically not associated with a discharge to the patient's home setting. Based on the patient's AM-PAC score and their current ADL deficits, it is recommended that the patient have 3-5 sessions per week of Occupational Therapy at d/c to increase the patient's independence.  Please see assessment section for further patient specific details.    If patient discharges prior to next session this note will serve as a discharge summary.  Please see below for the latest assessment towards goals.     Patient Diagnosis(es): The primary encounter diagnosis was Diabetic ketoacidosis without coma associated with type 2 diabetes mellitus (HCC). Diagnoses of Acute pulmonary embolism with acute cor pulmonale, unspecified pulmonary embolism type (HCC), Elevated liver transaminase level, Acute respiratory failure with hypoxia, Pleural effusion, Atrial fibrillation with RVR (HCC), and VTE (venous thromboembolism) were also pertinent to this visit.  Past Medical History:  has a past medical history of Allergic rhinitis, arthri, Arthritis, CAD (coronary artery disease), Chronic kidney disease, Constipation, DM (diabetes mellitus) ([facility]), Generalized pain, GERD (gastroesophageal reflux disease), Heart attack ([facility]), Heart disease, Hyperlipidemia, Movement disorder, Psychiatric problem, and Urinary retention.  Past Surgical History:  has a past surgical history that includes Full thickness skin graft (); Coronary angioplasty with stent (); Cardiac surgery; and back surgery.    Treatment Diagnosis: impaired ADL/fxl 
Occupational Therapy Attempt  Don HERRING Randal  11/6/2024  [id number]    Pt attempted, but declining therapy at this time d/t being in a comfortable position in bed. Pt gently encouraged, but still declining. He reported he would try after lunch, but pt informed there is no guarantee therapy can make it back to his room d/t schedule. Pt still declining this AM.     Electronically signed by Kenny Montero, OTR/L [id number] on 11/6/24 at 11:31 AM EST    
Occupational Therapy Discharge  Don HERRING Randal  11/7/2024  [id number]    Pt plan is to d/c w/ hospice care. OT signing off.     Electronically signed by Kenny Montero OTR/L [id number] on 11/7/24 at 11:10 AM EST    
Patient A&Ox4. He remains on milrinone, levophed and heparin gtt. Plan for discharge to inpatient hospice and transport set for 1800. Safety precautions in place.   
Patient converted into afib RVR at 2318. BP dropped to 83/62, but patient otherwise asymptomatic, alert, and oriented x4. Dr. Sandy Nielsen to bedside. Dr. Nielsen consulted Dr. Nunes with cardiology via telephone. 150 mg Amiodarone bolus given and 1mg/min drip started.     Electronically signed by Isidro Villarreal RN on 11/2/2024 at 3:03 AM   
Patient converted to a-fib/a-fib RVR with a rate from . BP dropped to 67/45. PerfectServe message sent to Mary Kate Browning NP. 150mg amiodarone bolus and 500 NS given.   
Per Dr. Sandy Nielsen, decrease amiodarone infusion from 1 mg/min to 0.5 mg/minute.  
Physical Therapy  Chart reviewed; PT spoke with Nursing.  Plan d/c with Hospice care.  PT to sign off at this time.  Allison Boone, PT    
Physical Therapy  Chart reviewed; spoke with nursing.  Hospice mtg with pt today.  Will follow-up as approp.  Allison Boone, PT    
Physical Therapy  Facility/Department: [address] ICU  Physical Therapy Initial Assessment    Name: Don Tee  : 1955  MRN: [id number]  Date of Service: 2024    Discharge Recommendations:  Patient would benefit from continued therapy after discharge, Therapy recommended at discharge (3-5 X frequency)   PT Equipment Recommendations  Equipment Needed: No  Other: defer to next level of care    Don Tee scored a  on the AM-PAC short mobility form. Current research shows that an AM-PAC score of 17 or less is typically not associated with a discharge to the patient's home setting. Based on the patient's AM-PAC score and their current functional mobility deficits, it is recommended that the patient have 3-5 sessions per week of Physical Therapy at d/c to increase the patient's independence.  Please see assessment section for further patient specific details.    If patient discharges prior to next session this note will serve as a discharge summary.  Please see below for the latest assessment towards goals.     Patient Diagnosis(es): The primary encounter diagnosis was Diabetic ketoacidosis without coma associated with type 2 diabetes mellitus (HCC). Diagnoses of Acute pulmonary embolism with acute cor pulmonale, unspecified pulmonary embolism type (HCC), Elevated liver transaminase level, Acute respiratory failure with hypoxia, Pleural effusion, Atrial fibrillation with RVR (HCC), and VTE (venous thromboembolism) were also pertinent to this visit.  Past Medical History:  has a past medical history of Allergic rhinitis, arthri, Arthritis, CAD (coronary artery disease), Chronic kidney disease, Constipation, DM (diabetes mellitus) (), Generalized pain, GERD (gastroesophageal reflux disease), Heart attack (HCC), Heart disease, Hyperlipidemia, Movement disorder, Psychiatric problem, and Urinary retention.  Past Surgical History:  has a past surgical history that includes Full thickness skin graft (); 
Physical Therapy  PT to bedside to cont current POC.  Pt adamantly refusing, states \"No way in hell.\"  Pt cont to refuse despite gentle encourage, benefits of eob/oob activities.  Nursing informed.  Allison Boone, PT    
Physical Therapy-attempt/pt declined  Don HERRING Randal  [id number]  Attempted PT / cotreat session with pt this date, however, pt declined services.  Pt reported \"maybe later this afternoon, after lunch.\"  Encouragement given to even mobilize sitting to edge of bed, or other, all to which pt declined at this time.  Will follow up another time if/as schedule allows.  If patient is discharged prior to the next Physical Therapy visit, please see last written PT note for discharge status.    Electronically signed by Brandie Nelson PT on 11/6/2024 at 11:31 AM        
Prolonged QT interval from 600-680s. PerfectServe message sent to Courtney Browning NP. Per NP, okay to give nightly medications as scheduled.    Electronically signed by Isidro Villarreal RN on 11/2/2024 at 9:27 PM   
This RN discussed patients conflicting code status with patient. Patient has DNR-CC paperwork signed on 7/12/24 but is listed as a Full Code in the chart. RN explained to the patient the difference between a Full Code and  DNR-CC. Pt stated he would like to remain a Full Code and wants everything done in the event of Cardiac or Respiratory Arrest. Will notify MD Osman.  
Transport arrived. IV gtts stopped. Patient transported to inpatient hospice  
  K 5.0 4.8   CO2 19* 24   BUN 64* 62*   CREATININE 1.2 1.0     LIVR:   Recent Labs     11/01/24  1612 11/02/24  0445   AST 1,795* 1,238*   ALT 2,356* 1,848*     PT/INR:   Recent Labs     11/01/24  1908   INR 2.62*     APTT: No results for input(s): \"APTT\" in the last 72 hours.  ABG: No results for input(s): \"PHART\", \"XFL4QSH\", \"PO2ART\" in the last 72 hours.    Any consults during the shift? No    Any signed and held orders to be released?  No        4 Eyes Skin Assessment       The patient is being assessed for  Shift Handoff    I agree that at least one RN has performed a thorough Head to Toe Skin Assessment on the patient. ALL assessment sites listed below have been assessed.      Areas assessed by both nurses: Head, Face, Ears, Shoulders, Back, Chest, Arms, Elbows, Hands, Sacrum. Buttock, Coccyx, Ischium, Legs. Feet and Heels, and Under Medical Devices         Does the Patient have a Wound? Yes wound(s) were present on assessment. LDA wound assessment was Initiated and completed by RN    Wound Care Orders initiated by RN: No       Toney Prevention initiated by RN: Yes    Pressure Injury (Stage 3,4, Unstageable, DTI, NWPT, and Complex wounds) if present, place Wound referral order by RN under : No    New Ostomies, if present place, Ostomy referral order under : No     Nurse 1 eSignature: Electronically signed by Gail Mendez RN on 11/2/24 at 2:09 PM EDT    **SHARE this note so that the co-signing nurse can place an eSignature**    Nurse 2 eSignature: Electronically signed by Isidro Villarreal RN on 11/2/24 at 9:27 PM EDT          
\"KETONESU\"  No results for input(s): \"PH\", \"PCO2\", \"PO2\" in the last 72 hours.    Films:  Chest imaging and associated reports were personally reviewed and showed CXR 11/6  IMPRESSION:  1. Indwelling left-sided locking loop chest tube, with no pneumothorax  identified.  2. Interval increased perihilar and bibasilar airspace disease which may  represent pulmonary edema and or pneumonia.  Components of posterior layering  effusions may be present as well.    ABG:  No new study    Cultures:  Blood: no growth  Urine:  Sputum:  Strep/Legionella Antigens:  MRSA probe:  Respiratory Viral Panel/Influenza: negative  C.Diff:   COVID19: negative  Cytology pleural fluid: negative for malignancy  Culture pleural fluid: negative    ECHO  11/2/24    Left Ventricle: Severely reduced left ventricular systolic function with a visually estimated EF of 10 -15%. Left ventricle size is normal. Moderately increased wall thickness. Findings consistent with moderate concentric hypertrophy. Severe global hypokinesis present.    Right Ventricle: Severely reduced systolic function. TAPSE is 0.7 cm.    IVC/SVC: IVC diameter is greater than 21 mm and decreases less than 50% during inspiration; therefore the estimated right atrial pressure is elevated (~15 mmHg). IVC is dilated.    Image quality is good. Procedure performed with the patient in a supine position.    PFTs  2018  Reason for PFT:  Emphysema     Uninterpretable: Test results are uninterpretable because of incomplete exhalation.     Overall Interpretation  Uninterpretable      FEV1 is 3.05 L at 79% predicted.     FEV1/FVC ratio is 69     He would not participate in testing.       Assessment/Plan:     Acute hypoxemic respiratory failure with SPO2 <90% on room air  Left pleural effusion s/p chest tube  Severe COPD  Maintain oxygen saturations >90% with supplemental oxygen and wean as tolerated  Symbicort, spiriva  Flagyl, cefepime per ID  Duonebs PRN  Chest tube discontinued without 
in medical decision making:  Discussion of patient care with other providers  Reviewed clinical lab tests  Reviewed radiology tests  Reviewed other diagnostic tests/interventions  Independent review of radiologic images  Microbiology cultures and other micro tests reviewed      Subjective:     Chief Complaint: Shortness of breath, hyperglycemia    Don Tee is a 68 y.o. male who presents with shortness of breath and hyperglycemia per visiting nurses to his house      Review of Systems:      Pertinent positives and negatives discussed in HPI    Objective:     Intake/Output Summary (Last 24 hours) at 11/3/2024 1219  Last data filed at 11/3/2024 1133  Gross per 24 hour   Intake 2375.28 ml   Output 6000 ml   Net -3624.72 ml      Vitals:   Vitals:    11/03/24 1133 11/03/24 1145 11/03/24 1200 11/03/24 1201   BP: 117/61 (!) 112/56 (!) 96/59 (!) 96/59   Pulse: 94 90 83 83   Resp: 11 12 12 12   Temp:   (!) 96.7 °F (35.9 °C)    TempSrc:   Axillary    SpO2: 98% 98% 98% 97%   Weight:       Height:             Physical Exam:      Physical Exam Performed:    BP (!) 96/59   Pulse 83   Temp (!) 96.7 °F (35.9 °C) (Axillary)   Resp 12   Ht 1.829 m (6')   Wt 64.1 kg (141 lb 5 oz)   SpO2 97%   BMI 19.17 kg/m²     General appearance: No apparent distress, while on 6 L nasal cannula  HEENT: Pupils equal, round, and reactive to light.   Neck: Supple, with full range of motion. Trachea midline.  Respiratory:  Normal respiratory effort while on oxygen, decreased right-sided air movement rhonchi, Rales.   Cardiovascular: Regular rate and rhythm   Abdomen: Soft, non-tender, non-distended   Musculoskeletal: No edema bilaterally.  Full range of motion without deformity.  Neurologic:  Neurovascularly intact   Psychiatric: Alert and oriented,   Capillary Refill: Brisk, 3 seconds, normal   Peripheral Pulses: +2 palpable, equal bilaterally       Medications:   Medications:    sodium chloride  500 mL IntraVENous Once    insulin lispro  0-16 
encounter of 11/01/24    CT CHEST WO CONTRAST    Narrative  EXAMINATION:  CT OF THE CHEST WITHOUT CONTRAST 11/2/2024 10:15 am    TECHNIQUE:  CT of the chest was performed without the administration of intravenous  contrast. Multiplanar reformatted images are provided for review. Automated  exposure control, iterative reconstruction, and/or weight based adjustment of  the mA/kV was utilized to reduce the radiation dose to as low as reasonably  achievable.    COMPARISON:  CTA chest dated 11/01/2024    HISTORY:  ORDERING SYSTEM PROVIDED HISTORY: pleural effusion  TECHNOLOGIST PROVIDED HISTORY:  Reason for exam:->pleural effusion  Reason for Exam: pleural effusion    FINDINGS:  Mediastinum: No lymphadenopathy.  Multiple calcified hilar and mediastinal  lymph nodes are seen.  The heart is enlarged.  No pericardial effusion.  There is distension of main pulmonary artery.  There is coronary artery  calcification and/or stent.    Lungs/pleura: There is lung emphysema.  Interval placement of left chest tube  with significant improvement of left pleural effusion, now residual trace to  small left pleural effusion.  There is dense consolidative opacity in the  posterior left lower lung.  There is a small right pleural effusion,  unchanged.    Upper Abdomen: No acute abnormalities are seen in the images through the  upper abdomen.  There is dense atherosclerotic calcification of the aorta and  mesenteric branches.  Calcified granulomas in the spleen and liver.  Excreted  prior contrast in the renal caliceal system.    Soft Tissues/Bones:   Unchanged compression deformity of T6 and T7.  There is  compression deformity of superior endplate of L1 vertebral body, similar to  an exam of 2021.    Impression  1. Interval placement of left chest tube with significant improvement of left  pleural effusion, now residual trace to small left pleural effusion.  2. Dense consolidative opacity in the posterior left lower lung, may  represent 
heart failure, LV systolic function bedside estimation is 10-20%  Overnight had low maps, milrinone started then levophed. Will benefit from mechanical support, interventional cardiology to see. Map better this morning.   Ischemic evaluation this coming week.   Give lasix 40 mg IV bid    Paroxysmal Atrial fibrillation with RVR  S/p DCCV in the ED. Had recurrence which causes drop in BP.   Start oral amiodarone 200 mg bid today.  Continue heparin drip for both PE and thromboembolic risk reduction with the newly diagnosed atrial fibrillation.      Acute PE subsegmental  On heparin drip.   Echocardiogram - shows biventricular failure.     Exudative large left pleural effusion.   Mass like consolidation left lower lobe  DKA  Per primary team and pulm/critical care.    Multiple medical conditions with risk of decompensation.       NOTE: This report was transcribed using voice recognition software. Every effort was made to ensure accuracy, however, inadvertent computerized transcription errors may be present.     Tarun Serrano MD  Cardiac Electrophysiology  [facility]    
CHEST PULMONARY EMBOLISM W CONTRAST    Narrative  EXAMINATION:  CTA OF THE CHEST 11/1/2024 5:17 pm    TECHNIQUE:  CTA of the chest was performed after the administration of intravenous  contrast.  Multiplanar reformatted images are provided for review.  MIP  images are provided for review. Automated exposure control, iterative  reconstruction, and/or weight based adjustment of the mA/kV was utilized to  reduce the radiation dose to as low as reasonably achievable.    COMPARISON:  11/01/2024 and 02/14/2020    HISTORY:  ORDERING SYSTEM PROVIDED HISTORY: tachycardia hypoxic  TECHNOLOGIST PROVIDED HISTORY:  Reason for exam:->tachycardia hypoxic  Additional Contrast?->1  Reason for Exam: SOB    FINDINGS:  Pulmonary Arteries: Motion artifact degrades image quality.  Main pulmonary  artery is dilated due to pulmonary hypertension.  However, there is a small  filling defect within the subsegmental right lower lobe pulmonary arteries  due to acute pulmonary thromboemboli.  Contrast refluxes into the hepatic  veins.    Mediastinum: Coronary artery calcifications are a marker of atherosclerosis.  There are no enlarged thoracic lymph nodes.    Lungs/pleura: The airway is patent.  There is no pneumothorax.  There are  small right and moderate to large left pleural effusions causing compressive  atelectasis including complete collapse of the left lower lobe.   Moderate to  severe emphysema involves the bilateral upper lungs.    Upper Abdomen: Images of the upper abdomen are unremarkable.    Soft Tissues/Bones: Degenerative changes involve the thoracic spine and left  shoulder.  The bones are demineralized.  There are old healed left rib  fractures.  There are new age-indeterminate compression fracture involving  the superior endplate of T6 and inferior endplate of T7.    Impression  1. Acute right pulmonary thromboemboli with equivocal right ventricular heart  strain.  2. Small right and moderate to large left pleural effusions 
medical decision making:  Discussion of patient care with other providers  Reviewed clinical lab tests  Reviewed radiology tests  Reviewed other diagnostic tests/interventions  Independent review of radiologic images  Microbiology cultures and other micro tests reviewed        Electronically signed by Mynor Encarnacion MD on 11/6/2024 at 7:00 AM  Comment: Please note this report has been produced using speech recognition software and may contain errors related to that system including errors in grammar, punctuation, and spelling, as well as words and phrases that may be inappropriate. If there are any questions or concerns, please feel free to contact the dictating provider for clarification.   
of the left lower lobe.  3. Age-indeterminate T6 and T7 compression fractures.  If there is point  tenderness, recommend further evaluation with nonemergent MRI of thoracic  spine.  Findings were discussed with HAMIDA HU at 5:46 pm on 11/1/2024.      CXR PA/LAT: No results found for this or any previous visit.      CXR portable: Results for orders placed during the hospital encounter of 11/01/24    XR CHEST PORTABLE    Narrative  EXAMINATION:  ONE XRAY VIEW OF THE CHEST    11/4/2024 1:37 am    COMPARISON:  11/01/2024    HISTORY:  ORDERING SYSTEM PROVIDED HISTORY: Left chest tube placement  TECHNOLOGIST PROVIDED HISTORY:  Reason for exam:->Left chest tube placement  Reason for Exam: chest tube got disconnected    FINDINGS:  In the interim, right PICC line and left pigtail catheter chest tube has been  placed.  The right PICC line tip is not clearly identified but suspected to  be at the level of the mid SVC.  With the left pigtail catheter chest tube in  place, there has been complete resolution of the previously seen moderate to  large left pleural effusion.  There is now moderate to large left  pneumothorax.  There may be trace right pleural effusion.  The lungs  otherwise are clear.  Surrounding osseous and soft tissue structures show no  acute abnormality.  There is chronic likely nonunion fracture at left humeral  neck.    Impression  1. Interval placement of left pigtail catheter chest tube with complete  resolution of the previously seen moderate to large left pleural effusion.  2. Moderate to large left pneumothorax.  3. Right PICC line tip is not clearly identified but suspected to be at the  level of the mid SVC.  4. Possible trace right pleural effusion.             This note was transcribed using Dragon Dictation software. Please disregard any translational errors.      RENY KAUR   [facility] Sleep [facility]  [phone]           
pleural effusions causing complete collapse of the left lower lobe. 3. Age-indeterminate T6 and T7 compression fractures.  If there is point tenderness, recommend further evaluation with nonemergent MRI of thoracic spine. Findings were discussed with HAMIDA HU at 5:46 pm on 11/1/2024.     XR CHEST PORTABLE    Result Date: 11/1/2024  EXAMINATION: ONE XRAY VIEW OF THE CHEST 11/1/2024 4:18 pm COMPARISON: 07/08/2024 HISTORY: ORDERING SYSTEM PROVIDED HISTORY: sob TECHNOLOGIST PROVIDED HISTORY: Reason for exam:->sob Reason for Exam: sob FINDINGS: Consolidation in the left lung and a moderate size left pleural effusion. Mild opacity also in the right lower lobe with a trace right pleural effusion.  Mediastinum is not widened.     Bilateral opacities and pleural effusions.       CBC:   Recent Labs     11/02/24 0445 11/03/24 0430 11/04/24  0425   WBC 13.4* 12.5* 10.2   HGB 12.8* 11.8* 12.5*    166 133*     BMP:    Recent Labs     11/03/24  0430 11/04/24  0425 11/04/24  1145   * 133* 132*   K 3.2* 2.9* 3.4*   CL 96* 94* 93*   CO2 27 31 29   BUN 48* 30* 26*   CREATININE 0.9 0.7* 0.7*   GLUCOSE 217* 109* 145*     Hepatic:   Recent Labs     11/02/24 0445 11/03/24  0430 11/04/24  0425   AST 1,238* 496* 519*   ALT 1,848* 1,241* 1,182*   BILITOT 0.6 0.7 0.7   ALKPHOS 396* 295* 256*     Lipids:   Lab Results   Component Value Date/Time    CHOL 129 02/07/2023 02:48 PM    HDL 57 02/07/2023 02:48 PM    TRIG 71 02/07/2023 02:48 PM     Hemoglobin A1C:   Lab Results   Component Value Date/Time    LABA1C 12.3 11/03/2024 04:30 AM     TSH:   Lab Results   Component Value Date/Time    TSH 1.08 01/09/2020 02:42 PM     Troponin: No results found for: \"TROPONINT\"  Lactic Acid:   Recent Labs     11/01/24  2215 11/02/24 0445 11/02/24  0842   LACTA 5.0* 2.3* 2.0     BNP:   Recent Labs     11/01/24  1612   PROBNP 14,575*     UA:  Lab Results   Component Value Date/Time    NITRU Negative 11/01/2024 08:59 PM    COLORU 
PM     Blood Cultures:   Lab Results   Component Value Date/Time    BC No Growth after 4 days of incubation. 07/08/2024 04:51 PM     Lab Results   Component Value Date/Time    BLOODCULT2 No Growth after 4 days of incubation. 07/08/2024 06:06 PM     Organism:   Lab Results   Component Value Date/Time    ORG Corynebacterium striatum 09/20/2024 04:27 PM    ORG Proteus mirabilis 09/20/2024 04:27 PM    ORG Staphylococcus aureus 09/20/2024 04:27 PM    ORG Klebsiella oxytoca 09/20/2024 04:27 PM    ORG Enterococcus faecalis 09/20/2024 04:27 PM    ORG Proteus mirabilis 09/20/2024 04:27 PM    ORG Klebsiella oxytoca 09/20/2024 04:27 PM    ORG Staphylococcus aureus 09/20/2024 04:27 PM    ORG Corynebacterium striatum 09/20/2024 04:27 PM         Electronically signed by Heidy Osman MD on 11/2/2024 at 12:05 PM  Comment: Please note this report has been produced using speech recognition software and may contain errors related to that system including errors in grammar, punctuation, and spelling, as well as words and phrases that may be inappropriate. If there are any questions or concerns, please feel free to contact the dictating provider for clarification.   
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 ([facility]) I24.0, I25.9    Allergic rhinitis due to animal hair and dander J30.81    Benign prostatic hyperplasia with urinary frequency N40.1, R35.0    Diabetes type 2, uncontrolled [id number]    Patient nonadherence Z91.199    Spinal stenosis of lumbar region M48.061    Weakness of both lower extremities R29.898    Immobility Z74.09    Nicotine dependence F17.200    Centrilobular emphysema ([facility]) J43.2    Type 2 diabetes mellitus with chronic kidney disease E11.22    Type 2 diabetes mellitus with hyperglycemia, with long-term current use of insulin ([facility]) E11.65, Z79.4    NSTEMI (non-ST elevated myocardial infarction) ([facility]) I21.4    Diabetic ketoacidosis without coma associated with type 2 diabetes mellitus ([facility]) E11.10    Acute respiratory failure with hypoxia J96.01    VTE (venous thromboembolism) I82.90    Acute liver failure without hepatic coma K72.00    GABRIELLE (acute kidney injury) ([facility]) N17.9    Acute systolic CHF (congestive heart failure), NYHA class 4 ([facility]) I50.21    Severe sepsis with acute organ dysfunction ([facility]) A41.9, R65.20    Atrial fibrillation with RVR ([facility]) I48.91    Acute pulmonary embolism with acute cor pulmonale ([facility]) I26.09    Cardiogenic shock R57.0    Elevated liver transaminase level R74.01    Pleural effusion J90    Lactic acid acidosis E87.20    Other pneumothorax J93.83    Neutrophilia D72.828    Poorly controlled diabetes mellitus ([facility]) E11.65        ICD-10-CM    1. Diabetic ketoacidosis without coma associated with type 2 diabetes mellitus ([facility])  E11.10       2. Acute pulmonary embolism with acute cor pulmonale, unspecified pulmonary embolism type ([facility])  I26.09       3. Elevated liver transaminase level  R74.01       4. Acute respiratory failure with hypoxia  J96.01       5. Pleural effusion  J90       6. Atrial fibrillation with RVR ([facility])  I48.91       7. VTE (venous thromboembolism)  
hr  Cont  IV Flagyl x  500 mg q  8hr  Blood culture in process NGTD   Pleural fluid culture in process ngtd   Rapid flu is negative  COVID-19 negative  On Pressor support for Cardiogenic and septic shock   He is on Milrinone infusion  He is on Heparin Anticoagulation   Over all prognosis poor  DNR CC  noted     Discussed with patient/Family and Nursing     Medical Decision Making:  The following items were considered in medical decision making:  Discussion of patient care with other providers  Reviewed clinical lab tests  Reviewed radiology tests  Reviewed other diagnostic tests/interventions  Independent review of radiologic images  Independent review of  Microbiology cultures and other micro tests reviewed     Risk of Complications/Morbidity: High      IPatient is critically ill and has a potentially life threatening infection that poses threat to life/bodily function.   There is potential for worsening infection/ sudden clinically significant or life-threatening deterioration in the patient's condition without appropriate antimicrobial therapy.  Complex medical decision making process was involved to select appropriate antimicrobial agents to reverse the cause of patient's severe infection/ illness.  Antimicrobial therapy requires intensive monitoring for toxicity and frequent dose adjustments to prevent toxicity and permanent end-organ dysfunction     Thanks for allowing me to participate in your patient's care please call me with any questions or concerns.    Dr. Sharon Wright MD  Infectious Disease  [facility] Physician  Phone: [phone]   Fax : [phone]

## 2024-11-07 NOTE — DISCHARGE SUMMARY
V2.0  Discharge Summary    Name:  Don Tee /Age/Sex: 1955 (68 y.o. male)   Admit Date: 2024  Discharge Date: 24    MRN & CSN:  [id number] & [id number] Encounter Date and Time 24 11:32 AM EST    Attending:  Mynor Encarnacion MD Discharging Provider: Mynor Encarnacion MD       Hospital Course:     Brief HPI: Don Tee is a 68 y.o. gentleman with past medical history stated below significant for history of smoking, COPD/emphysema, poorly controlled diabetes mellitus, presented to the hospital with worsening shortness of breath and elevated blood sugars, found with large left pleural effusion with significant left lung collapse, A-fib with RVR,  Status post chest tube fluid analysis exudative.  Found with PE    Brief Problem Based Course:   Acute systolic heart failure.  EF 2015%  Cardiogenic shock  Treated with Levophed, milrinone drips..    Not a candidate for any goal-directed medical therapy or advanced heart failure therapies per cardiology  Patient elected to pursue hospice at discharge and was discharged to inpatient hospice    Paroxysmal atrial fibrillation-in sinus rhythm-treated with amiodarone/heparin drip     Large Left pleural effusion status post chest tube placement--improved with near complete reexpansion of the lung  Left pneumothorax-resolved status post chest tube placement     Chest tube removed 10/5-cultures and cytology negative  Possible sepsis  Empiric antibiotics per ID  COPD/severe emphysema without acute exacerbation-on bronchodilators  Acute respiratory failure hypoxia-currently on 2 L nasal cannula  Acute pulmonary embolism with possible right heart strain-treated with heparin     DKA-resolved  Diabetes mellitus type 2-on basal bolus insulin  Transaminitis due to CHF/cardiogenic shock-continue to trend   acute kidney injury-resolved  Hyponatremia  Hypokalemia-repleted  Thrombocytopenia    Given patient's overall poor prognosis, palliative care was consulted,

## 2024-11-07 NOTE — CARE COORDINATION
CASE MANAGEMENT DISCHARGE SUMMARY: Discharge order noted. Patient discharging to inpatient hospice.    DISCHARGE DATE: 11/7/24    DISCHARGED TO: Inpatient Hospice    Hospice [facility]    TRANSPORTATION: [facility]             TIME: 6:00 PM          Deepti DEJESUS, RN  Case Management  [phone]             Electronically signed by Deepti Menjivar RN on 11/7/2024 at 4:16 PM

## 2024-11-07 NOTE — DISCHARGE INSTR - COC
[P.O.:480; I.V.:1039.1; IV Piggyback:1480.4]  Out: 5270 [Urine:5270]    Safety Concerns:     At Risk for Falls    Impairments/Disabilities:      Vision    Nutrition Therapy:  Current Nutrition Therapy:   - Oral Diet:  Carb Control 4 carbs/meal (1800kcals/day)    Routes of Feeding: Oral  Liquids: Thin Liquids  Daily Fluid Restriction: yes - amount 1500  Last Modified Barium Swallow with Video (Video Swallowing Test): {Done Not Done Date:304088012}    Treatments at the Time of Hospital Discharge:   Respiratory Treatments: ***  Oxygen Therapy:  is on oxygen at 5 L/min per nasal cannula.  Ventilator:    - No ventilator support    Rehab Therapies: {THERAPEUTIC INTERVENTION:7961405907}  Weight Bearing Status/Restrictions: No weight bearing restrictions  Other Medical Equipment (for information only, NOT a DME order):  {EQUIPMENT:581717491}  Other Treatments: ***    Patient's personal belongings (please select all that are sent with patient):  {[facility] DME Belongings:679670134}    RN SIGNATURE:  Electronically signed by Ursula Parikh RN on 11/7/24 at 4:31 PM EST    CASE MANAGEMENT/SOCIAL WORK SECTION    Inpatient Status Date: ***    Readmission Risk Assessment Score:  Readmission Risk              Risk of Unplanned Readmission:  30           Discharging to Facility/ Agency   Name:   Address:  Phone:  Fax:    Dialysis Facility (if applicable)   Name:  Address:  Dialysis Schedule:  Phone:  Fax:    / signature: {Esignature:[id number]}    PHYSICIAN SECTION    Prognosis: Poor    Condition at Discharge: Terminal    Rehab Potential (if transferring to Rehab): Poor    Recommended Labs or Other Treatments After Discharge:     Physician Certification: I certify the above information and transfer of Don Tee  is necessary for the continuing treatment of the diagnosis listed and that he requires Hospice for greater 30 days.     Update Admission H&P: No change in H&P    PHYSICIAN SIGNATURE:  Electronically signed

## 2024-11-08 LAB
A1AT PHENOTYP SERPL-IMP: ABNORMAL
A1AT SERPL-MCNC: 242 MG/DL (ref 90–200)

## 2024-11-11 LAB
FUNGUS SPEC CULT: NORMAL
LOEFFLER MB STN SPEC: NORMAL

## 2024-11-12 LAB
ACID FAST STN SPEC QL: NORMAL
MYCOBACTERIUM SPEC CULT: NORMAL

## 2024-11-18 LAB
FUNGUS SPEC CULT: NORMAL
LOEFFLER MB STN SPEC: NORMAL

## 2024-11-19 LAB
ACID FAST STN SPEC QL: NORMAL
MYCOBACTERIUM SPEC CULT: NORMAL

## 2024-11-25 LAB
FUNGUS SPEC CULT: NORMAL
LOEFFLER MB STN SPEC: NORMAL

## 2024-11-26 LAB
ACID FAST STN SPEC QL: NORMAL
MYCOBACTERIUM SPEC CULT: NORMAL

## 2024-12-02 LAB
FUNGUS SPEC CULT: NORMAL
LOEFFLER MB STN SPEC: NORMAL

## 2024-12-03 LAB
ACID FAST STN SPEC QL: NORMAL
MYCOBACTERIUM SPEC CULT: NORMAL

## 2024-12-10 LAB
ACID FAST STN SPEC QL: NORMAL
MYCOBACTERIUM SPEC CULT: NORMAL

## 2024-12-17 LAB
ACID FAST STN SPEC QL: NORMAL
MYCOBACTERIUM SPEC CULT: NORMAL